# Patient Record
Sex: FEMALE | Race: WHITE | NOT HISPANIC OR LATINO | Employment: OTHER | URBAN - METROPOLITAN AREA
[De-identification: names, ages, dates, MRNs, and addresses within clinical notes are randomized per-mention and may not be internally consistent; named-entity substitution may affect disease eponyms.]

---

## 2017-01-16 DIAGNOSIS — F13.20 BENZODIAZEPINE DEPENDENCE (HCC): ICD-10-CM

## 2017-01-16 DIAGNOSIS — F41.9 ANXIETY: ICD-10-CM

## 2017-01-16 RX ORDER — LORAZEPAM 0.5 MG/1
TABLET ORAL
Qty: 45 TABLET | Refills: 0 | OUTPATIENT
Start: 2017-01-16 | End: 2017-03-04 | Stop reason: SDUPTHER

## 2017-02-08 ENCOUNTER — OFFICE VISIT (OUTPATIENT)
Dept: FAMILY MEDICINE CLINIC | Facility: CLINIC | Age: 82
End: 2017-02-08

## 2017-02-08 VITALS
TEMPERATURE: 97.9 F | DIASTOLIC BLOOD PRESSURE: 70 MMHG | HEART RATE: 80 BPM | BODY MASS INDEX: 24.59 KG/M2 | WEIGHT: 144 LBS | SYSTOLIC BLOOD PRESSURE: 118 MMHG | OXYGEN SATURATION: 96 % | HEIGHT: 64 IN | RESPIRATION RATE: 16 BRPM

## 2017-02-08 DIAGNOSIS — I10 ESSENTIAL HYPERTENSION: ICD-10-CM

## 2017-02-08 DIAGNOSIS — F41.1 GENERALIZED ANXIETY DISORDER: ICD-10-CM

## 2017-02-08 DIAGNOSIS — R53.83 OTHER FATIGUE: Primary | ICD-10-CM

## 2017-02-08 DIAGNOSIS — R50.81 FEVER IN OTHER DISEASES: ICD-10-CM

## 2017-02-08 DIAGNOSIS — E53.8 VITAMIN B12 DEFICIENCY: ICD-10-CM

## 2017-02-08 PROCEDURE — 99214 OFFICE O/P EST MOD 30 MIN: CPT | Performed by: FAMILY MEDICINE

## 2017-02-09 LAB
ALBUMIN SERPL-MCNC: 4.6 G/DL (ref 3.5–5.2)
ALBUMIN/GLOB SERPL: 1.8 G/DL
ALP SERPL-CCNC: 71 U/L (ref 39–117)
ALT SERPL-CCNC: 16 U/L (ref 1–33)
APPEARANCE UR: CLEAR
AST SERPL-CCNC: 20 U/L (ref 1–32)
BACTERIA #/AREA URNS HPF: ABNORMAL /HPF
BASOPHILS # BLD AUTO: 0.02 10*3/MM3 (ref 0–0.2)
BASOPHILS NFR BLD AUTO: 0.2 % (ref 0–1.5)
BILIRUB SERPL-MCNC: 1.2 MG/DL (ref 0.1–1.2)
BILIRUB UR QL STRIP: NEGATIVE
BUN SERPL-MCNC: 27 MG/DL (ref 8–23)
BUN/CREAT SERPL: 22.5 (ref 7–25)
CALCIUM SERPL-MCNC: 10.2 MG/DL (ref 8.6–10.5)
CASTS URNS MICRO: ABNORMAL
CHLORIDE SERPL-SCNC: 96 MMOL/L (ref 98–107)
CO2 SERPL-SCNC: 26.3 MMOL/L (ref 22–29)
COLOR UR: YELLOW
CREAT SERPL-MCNC: 1.2 MG/DL (ref 0.57–1)
EOSINOPHIL # BLD AUTO: 0.1 10*3/MM3 (ref 0–0.7)
EOSINOPHIL NFR BLD AUTO: 1 % (ref 0.3–6.2)
EPI CELLS #/AREA URNS HPF: ABNORMAL /HPF
ERYTHROCYTE [DISTWIDTH] IN BLOOD BY AUTOMATED COUNT: 13 % (ref 11.7–13)
GLOBULIN SER CALC-MCNC: 2.5 GM/DL
GLUCOSE SERPL-MCNC: 95 MG/DL (ref 65–99)
GLUCOSE UR QL: NEGATIVE
HCT VFR BLD AUTO: 44.1 % (ref 35.6–45.5)
HGB BLD-MCNC: 14.3 G/DL (ref 11.9–15.5)
HGB UR QL STRIP: NEGATIVE
IMM GRANULOCYTES # BLD: 0 10*3/MM3 (ref 0–0.03)
IMM GRANULOCYTES NFR BLD: 0 % (ref 0–0.5)
KETONES UR QL STRIP: NEGATIVE
LEUKOCYTE ESTERASE UR QL STRIP: ABNORMAL
LYMPHOCYTES # BLD AUTO: 1.59 10*3/MM3 (ref 0.9–4.8)
LYMPHOCYTES NFR BLD AUTO: 16 % (ref 19.6–45.3)
MCH RBC QN AUTO: 29.9 PG (ref 26.9–32)
MCHC RBC AUTO-ENTMCNC: 32.4 G/DL (ref 32.4–36.3)
MCV RBC AUTO: 92.1 FL (ref 80.5–98.2)
MONOCYTES # BLD AUTO: 0.85 10*3/MM3 (ref 0.2–1.2)
MONOCYTES NFR BLD AUTO: 8.6 % (ref 5–12)
NEUTROPHILS # BLD AUTO: 7.35 10*3/MM3 (ref 1.9–8.1)
NEUTROPHILS NFR BLD AUTO: 74.2 % (ref 42.7–76)
NITRITE UR QL STRIP: NEGATIVE
PH UR STRIP: 7 [PH] (ref 5–8)
PLATELET # BLD AUTO: 283 10*3/MM3 (ref 140–500)
POTASSIUM SERPL-SCNC: 3.6 MMOL/L (ref 3.5–5.2)
PROT SERPL-MCNC: 7.1 G/DL (ref 6–8.5)
PROT UR QL STRIP: NEGATIVE
RBC # BLD AUTO: 4.79 10*6/MM3 (ref 3.9–5.2)
RBC #/AREA URNS HPF: ABNORMAL /HPF
SODIUM SERPL-SCNC: 140 MMOL/L (ref 136–145)
SP GR UR: 1.02 (ref 1–1.03)
TSH SERPL DL<=0.005 MIU/L-ACNC: 3.1 MIU/ML (ref 0.27–4.2)
UROBILINOGEN UR STRIP-MCNC: ABNORMAL MG/DL
VIT B12 SERPL-MCNC: 1753 PG/ML (ref 211–946)
WBC # BLD AUTO: 9.91 10*3/MM3 (ref 4.5–10.7)
WBC #/AREA URNS HPF: ABNORMAL /HPF

## 2017-02-09 NOTE — PROGRESS NOTES
No definite cause of fatigue found...However the vitamin B12 level is very high.  This may be a factor.  I want her to stop all vitamin B12.  We'll need to recheck a level in 3-6 months.  If fatigue continues, follow-up with me in the next few weeks.  No evidence of UTI.  No bacteria in the urine.  Kidney function just slightly sluggish.  Recommend drinking plenty of water.

## 2017-03-04 DIAGNOSIS — F41.9 ANXIETY: ICD-10-CM

## 2017-03-04 DIAGNOSIS — F13.20 BENZODIAZEPINE DEPENDENCE (HCC): ICD-10-CM

## 2017-03-06 RX ORDER — LORAZEPAM 0.5 MG/1
TABLET ORAL
Qty: 45 TABLET | Refills: 0 | Status: SHIPPED | OUTPATIENT
Start: 2017-03-06 | End: 2017-04-15 | Stop reason: SDUPTHER

## 2017-04-15 DIAGNOSIS — F13.20 BENZODIAZEPINE DEPENDENCE (HCC): ICD-10-CM

## 2017-04-15 DIAGNOSIS — F41.9 ANXIETY: ICD-10-CM

## 2017-04-17 RX ORDER — LANSOPRAZOLE 30 MG/1
CAPSULE, DELAYED RELEASE ORAL
Qty: 90 CAPSULE | Refills: 1 | Status: SHIPPED | OUTPATIENT
Start: 2017-04-17 | End: 2017-10-12 | Stop reason: SDUPTHER

## 2017-04-17 RX ORDER — LORAZEPAM 0.5 MG/1
TABLET ORAL
Qty: 45 TABLET | Refills: 0 | OUTPATIENT
Start: 2017-04-17 | End: 2017-05-15 | Stop reason: SDUPTHER

## 2017-04-28 ENCOUNTER — OFFICE VISIT (OUTPATIENT)
Dept: FAMILY MEDICINE CLINIC | Facility: CLINIC | Age: 82
End: 2017-04-28

## 2017-04-28 VITALS
HEART RATE: 80 BPM | TEMPERATURE: 97.6 F | DIASTOLIC BLOOD PRESSURE: 66 MMHG | WEIGHT: 151 LBS | OXYGEN SATURATION: 98 % | SYSTOLIC BLOOD PRESSURE: 112 MMHG | HEIGHT: 63 IN | BODY MASS INDEX: 26.75 KG/M2

## 2017-04-28 DIAGNOSIS — R60.9 DEPENDENT EDEMA: Primary | ICD-10-CM

## 2017-04-28 DIAGNOSIS — G60.9 IDIOPATHIC PERIPHERAL NEUROPATHY: ICD-10-CM

## 2017-04-28 PROCEDURE — 99213 OFFICE O/P EST LOW 20 MIN: CPT | Performed by: FAMILY MEDICINE

## 2017-04-28 NOTE — PROGRESS NOTES
"Subjective   Charo Thomas is a 88 y.o. female.     Chief Complaint   Patient presents with   • Foot Swelling     left 3 weeks        History of Present Illness    Bilateral lower extremity swelling for the last couple of weeks.  Left low but worse in the right.  Doesn't really bother her.  A family member is concerned.  No shortness of breath.  Orthopnea.  No chest pain.  She is exercising somewhat.  History of borderline renal insufficiency.  She's taking triamterene hydrochlorothiazide daily.  Not taking NSAIDs.  No change in diet.  History of neuropathy with numbness in the legs and now some tingling in the feet.  Neuropathy cause is unknown.  No history of chemotherapy.  Remote history of breast cancer.  Had radiation treatment.      The following portions of the patient's history were reviewed and updated as appropriate: allergies, current medications, past family history, past medical history, past social history, past surgical history and problem list.          Review of Systems   Constitutional: Negative.    Respiratory: Negative.    Cardiovascular: Positive for leg swelling. Negative for chest pain and palpitations.   Neurological: Positive for numbness.   Psychiatric/Behavioral: Negative.        Objective   Blood pressure 112/66, pulse 80, temperature 97.6 °F (36.4 °C), temperature source Oral, height 63\" (160 cm), weight 151 lb (68.5 kg), SpO2 98 %.  Physical Exam   Constitutional: She appears well-developed and well-nourished. No distress.   Neck: No thyromegaly present.   Cardiovascular: Normal rate, regular rhythm, normal heart sounds and intact distal pulses.    Pulmonary/Chest: Effort normal and breath sounds normal.   Musculoskeletal: She exhibits edema ( Trace bilateral lower extremity edema.  Left greater than right.  No calf tenderness or swelling.  No skin discoloration.  +1 dorsalis pedis pulse.).   Skin: Skin is warm and dry.   Psychiatric: She has a normal mood and affect. Her behavior is " normal. Judgment and thought content normal.   Nursing note and vitals reviewed.      Assessment/Plan   Charo was seen today for foot swelling.    Diagnoses and all orders for this visit:    Dependent edema  -     Basic Metabolic Panel    Idiopathic peripheral neuropathy     dependent edema.  Relatively asymptomatic.  Possibly related to her peripheral neuropathy.  Previous borderline renal insufficiency.  Continue the diuretic.  Check BMP.  Recommend exercise.  Recommend compression stockings if comfortable.  Call with worsening edema.  No current evidence of congestive heart failure or other major cardiovascular problems.

## 2017-04-29 LAB
BUN SERPL-MCNC: 22 MG/DL (ref 8–23)
BUN/CREAT SERPL: 22.2 (ref 7–25)
CALCIUM SERPL-MCNC: 10.3 MG/DL (ref 8.6–10.5)
CHLORIDE SERPL-SCNC: 97 MMOL/L (ref 98–107)
CO2 SERPL-SCNC: 29.9 MMOL/L (ref 22–29)
CREAT SERPL-MCNC: 0.99 MG/DL (ref 0.57–1)
GLUCOSE SERPL-MCNC: 109 MG/DL (ref 65–99)
POTASSIUM SERPL-SCNC: 3.8 MMOL/L (ref 3.5–5.2)
SODIUM SERPL-SCNC: 143 MMOL/L (ref 136–145)

## 2017-05-04 ENCOUNTER — TELEPHONE (OUTPATIENT)
Dept: FAMILY MEDICINE CLINIC | Facility: CLINIC | Age: 82
End: 2017-05-04

## 2017-05-15 DIAGNOSIS — F13.20 BENZODIAZEPINE DEPENDENCE (HCC): ICD-10-CM

## 2017-05-15 DIAGNOSIS — F41.9 ANXIETY: ICD-10-CM

## 2017-05-15 RX ORDER — LORAZEPAM 0.5 MG/1
TABLET ORAL
Qty: 45 TABLET | Refills: 0 | OUTPATIENT
Start: 2017-05-15 | End: 2017-07-17 | Stop reason: SDUPTHER

## 2017-05-30 ENCOUNTER — APPOINTMENT (OUTPATIENT)
Dept: OTHER | Facility: HOSPITAL | Age: 82
End: 2017-05-30

## 2017-06-13 ENCOUNTER — OFFICE VISIT (OUTPATIENT)
Dept: ONCOLOGY | Facility: CLINIC | Age: 82
End: 2017-06-13

## 2017-06-13 ENCOUNTER — LAB (OUTPATIENT)
Dept: OTHER | Facility: HOSPITAL | Age: 82
End: 2017-06-13

## 2017-06-13 VITALS
DIASTOLIC BLOOD PRESSURE: 70 MMHG | BODY MASS INDEX: 26.59 KG/M2 | SYSTOLIC BLOOD PRESSURE: 106 MMHG | HEART RATE: 90 BPM | TEMPERATURE: 97.6 F | RESPIRATION RATE: 16 BRPM | OXYGEN SATURATION: 96 % | HEIGHT: 63 IN | WEIGHT: 150.1 LBS

## 2017-06-13 DIAGNOSIS — E53.8 VITAMIN B12 DEFICIENCY: ICD-10-CM

## 2017-06-13 DIAGNOSIS — C50.811 MALIGNANT NEOPLASM OF OVERLAPPING SITES OF RIGHT FEMALE BREAST (HCC): ICD-10-CM

## 2017-06-13 DIAGNOSIS — C50.811 MALIGNANT NEOPLASM OF OVERLAPPING SITES OF RIGHT FEMALE BREAST (HCC): Primary | ICD-10-CM

## 2017-06-13 LAB
ALBUMIN SERPL-MCNC: 4.4 G/DL (ref 3.5–5.2)
ALBUMIN/GLOB SERPL: 1.4 G/DL
ALP SERPL-CCNC: 73 U/L (ref 39–117)
ALT SERPL W P-5'-P-CCNC: 13 U/L (ref 1–33)
ANION GAP SERPL CALCULATED.3IONS-SCNC: 15.4 MMOL/L
AST SERPL-CCNC: 20 U/L (ref 1–32)
BASOPHILS # BLD AUTO: 0.05 10*3/MM3 (ref 0–0.2)
BASOPHILS NFR BLD AUTO: 0.8 % (ref 0–1.5)
BILIRUB SERPL-MCNC: 1.2 MG/DL (ref 0.1–1.2)
BUN BLD-MCNC: 22 MG/DL (ref 8–23)
BUN/CREAT SERPL: 19.8 (ref 7–25)
CALCIUM SPEC-SCNC: 10.1 MG/DL (ref 8.6–10.5)
CHLORIDE SERPL-SCNC: 99 MMOL/L (ref 98–107)
CO2 SERPL-SCNC: 31.6 MMOL/L (ref 22–29)
CREAT BLD-MCNC: 1.11 MG/DL (ref 0.57–1)
DEPRECATED RDW RBC AUTO: 42.5 FL (ref 37–54)
EOSINOPHIL # BLD AUTO: 0.13 10*3/MM3 (ref 0–0.7)
EOSINOPHIL NFR BLD AUTO: 2 % (ref 0.3–6.2)
ERYTHROCYTE [DISTWIDTH] IN BLOOD BY AUTOMATED COUNT: 13 % (ref 11.7–13)
GFR SERPL CREATININE-BSD FRML MDRD: 46 ML/MIN/1.73
GLOBULIN UR ELPH-MCNC: 3.1 GM/DL
GLUCOSE BLD-MCNC: 87 MG/DL (ref 65–99)
HCT VFR BLD AUTO: 43.7 % (ref 35.6–45.5)
HGB BLD-MCNC: 14.5 G/DL (ref 11.9–15.5)
IMM GRANULOCYTES # BLD: 0.02 10*3/MM3 (ref 0–0.03)
IMM GRANULOCYTES NFR BLD: 0.3 % (ref 0–0.5)
LYMPHOCYTES # BLD AUTO: 1.77 10*3/MM3 (ref 0.9–4.8)
LYMPHOCYTES NFR BLD AUTO: 27.7 % (ref 19.6–45.3)
MCH RBC QN AUTO: 29.7 PG (ref 26.9–32)
MCHC RBC AUTO-ENTMCNC: 33.2 G/DL (ref 32.4–36.3)
MCV RBC AUTO: 89.4 FL (ref 80.5–98.2)
MONOCYTES # BLD AUTO: 0.46 10*3/MM3 (ref 0.2–1.2)
MONOCYTES NFR BLD AUTO: 7.2 % (ref 5–12)
NEUTROPHILS # BLD AUTO: 3.97 10*3/MM3 (ref 1.9–8.1)
NEUTROPHILS NFR BLD AUTO: 62 % (ref 42.7–76)
NRBC BLD MANUAL-RTO: 0 /100 WBC (ref 0–0)
PLATELET # BLD AUTO: 241 10*3/MM3 (ref 140–500)
PMV BLD AUTO: 9.8 FL (ref 6–12)
POTASSIUM BLD-SCNC: 3.3 MMOL/L (ref 3.5–5.2)
PROT SERPL-MCNC: 7.5 G/DL (ref 6–8.5)
RBC # BLD AUTO: 4.89 10*6/MM3 (ref 3.9–5.2)
SODIUM BLD-SCNC: 146 MMOL/L (ref 136–145)
WBC NRBC COR # BLD: 6.4 10*3/MM3 (ref 4.5–10.7)

## 2017-06-13 PROCEDURE — 80053 COMPREHEN METABOLIC PANEL: CPT | Performed by: INTERNAL MEDICINE

## 2017-06-13 PROCEDURE — 36415 COLL VENOUS BLD VENIPUNCTURE: CPT

## 2017-06-13 PROCEDURE — 99213 OFFICE O/P EST LOW 20 MIN: CPT | Performed by: INTERNAL MEDICINE

## 2017-06-13 PROCEDURE — 85025 COMPLETE CBC W/AUTO DIFF WBC: CPT | Performed by: INTERNAL MEDICINE

## 2017-06-13 NOTE — PROGRESS NOTES
REASONS FOR FOLLOWUP:      1.    History of stage I breast cancer on the right, invasive status post lumpectomy and radiation therapy.  The patient could not accomplish adjuv  ant hormonal therapy.     2. History of vi  tamin B12 deficiency that has been corrected with sublingual vitamin B12 that will remain ongoing at a dose of 1000 mcg every day.    3. Psychiatric illness.  The patient at this time remains and looks compensated.        HISTORY OF PRESENT ILLNESS:  The patient retur  ns today to the office for followup.  She is here today with no new issues.  She remains active physically and mentally and being with a lot of socialization with friends and family.  Her mental illness seems to be very quiet at this time.  Her tremendous     anxiety is not an issue and she has the Ativan to control this part of her problem.  Otherwise the patient has a good level of energy.  Weight is stable.  Her diet is appropriate.  She has no bowel or urinary difficulties.  She has no new cardiorespirator  y   issues.  She has dizziness that has been further evaluated by Dr. 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     She continues complaining of her balance problems associated with her peripheral neuropathy.  She has not had a fall .  She continues using a walker..  Otherwise, she has no symptoms related to her breast cancer that was diagnosed and treated a long time ago.                PAST MEDICAL HISTORY:        1.  Hypertension, on therapy for this.       2. Hyperlipidemia with just diet therapy.      3.   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  No Gaytan's esophagus was found.  She was found to have hyperplastic polyps in the stomach with no atypia.  Duodenum was normal.      4. H. pyloric infection.      5. Colonoscopy documented diverticular disease of colon.      6. Past history of migraines but none for a long time.          HEMATOLOGIC/ONCOLOGIC HISTORY:  History from  previous dates can be found in the separate document.         On 2015 she has no clinical evidence of recurrence of her breast cancer.    Bilateral breast examination was normal and she has otherwise a negative examination.  We advised her to remain in observation.  We went ahead and requested information in regard to the MRI of the brain to further delineate what was discovered and the co  nsultation by Dr. Limon in regard to ENT assessment.        MEDICATIONS:  The current medication list was reviewed with the patient and updates in the EMR this date per the medical assistant.  Medication dosages and frequencies were confirmed to be accurate        ALLERGIES:        1.;  SULFA.    2.; PENICILLIN.     3.; EPINEPHRINE.     4.; CORTISONE.         SOCIAL HISTORY:   The patient is  and lives with  here in [place].  She worked for [employer]; she has been retired for 25 years.  She remains physically and mentally ac  tive and is active with her [employer].  She does not smoke or drink.          FAMILY HISTORY:   Father  of old age at 85. Mother  with dementia at age 82 but had breast removed at age 81 for breast cancer.  The patient had no siblings.   is in good h  ealth.  Her only son was diagnosed with a brain tumor.  I (Dr. Edmond) saw him a year ago and he has been treated at [facility].  Two maternal aunts developed breast cancer; one was age 49 and the other age 70.  Both  at age 90; deaths not due to   consequence of breast cancer.         REVIEW OF SYSTEMS:       PAIN:  See VITAL SIGNS below.        GENERAL:  No change in appetite or weight; no fevers, chills, sweats.       SKIN:  No rashes or nonhealing lesions.    HEME/LYMPH:  No anemia, easy bruising, bleeding or swollen nodes.    EYES:  No vision changes or diplopia.    ENT:  No tinnitus, hearing loss, gum bleeding, epistaxis, hoarseness or dysphagia.    RESPIRATORY:  No cough, shortness of breath,  "hemoptysis or wheezing.    CVS:  No chest pain, palpitations, orthopnea, dyspnea on exertion or PND.    GI:  No abdominal pain, nausea, vomiting, constipation, diarrhea, melena or hematochezia.    : No dysuria or hematuria.  No abnormal vaginal discharge or bleeding.     MUSCULOSKELETAL:  No bone pain or joint stiffness.    NEUROLOGICAL:  No   dizziness, loss of consciousness, focal weakness or  seizures.  Balance difficulties, near fall many times, neuropathy in her legs.    PSYCHIATRIC:   increased nervousness, occasional mood changes, some  depression. Under good control        VITAL SIGNS:   Vitals:    17 1007   BP: 106/70   Pulse: 90   Resp: 16   Temp: 97.6 °F (36.4 °C)   TempSrc: Oral   SpO2: 96%   Weight: 150 lb 1.6 oz (68.1 kg)   Height: 62.99\" (160 cm)     PAIN: 0 out of 10     ECO         PHYSICAL EXAMINATION:    GENERAL:  White female in no acute distress.     SKIN:  Warm, dry without rashes, purpura or petechiae.    HEAD:  Normocephalic.    EYES:  Pupils were equal and reactive to light and accommodation.  She had no nystagmus.     EARS:  Hearing intact.      NOSE:  Septum midline.  No excoriations or nasal discharge.    MOUTH:  Mucosa was normal. Oropharynx was normal. She had no tenderness on the parotid gland and neither enlargement of this.     NECK:  Supple with good range of motion; no thyromegaly or masses, no JVD or bruits.      THROAT:  Oropharynx without lesions or exudates.    NECK:  Supple with good range of motion; no thyromegaly or masses, no JVD or bruits.    LYMPHATICS:  She had no cervical masses or adenopathy. She had no thyroid enlargement.     BREASTS:  Surgical site in the rig  ht breast well healed with no masses, induration or tenderness.  Normal nipple, normal skin.  No axillary adenopathy.  Left breast disclosed no masses, induration or tenderness, normal nipple, normal skin, normal left axilla.      CHEST:  Lungs were perfectly clear with normal breath sounds. No " wheezing, crackles, rhonchi or rubs.     CARDIAC:  Regular. Normal S1, normal S2. No S3. No S4. No murmurs or rubs.     ABDOMEN:  Soft, nontender with no organomegaly or masses.     EXTREMITIES:  Disclosed no tenderness. No edema.     NEURO: neuropathy in both legs with normal strength distlly and proximally, lost of vibratory sensation left leg, decreased r leg compared with upper extremities, norm;la reflexes, no obvious myelopathy         LABORATORY DATA:  Auto Differential   Order: [id number] - Part of Panel Order [id number]   Status:  Final result   Visible to patient:  No (Not Released) Dx:  Malignant neoplasm of overlapping sit...      Ref Range & Units 9:55 AM     WBC 4.50 - 10.70 10*3/mm3 6.40   RBC 3.90 - 5.20 10*6/mm3 4.89   Hemoglobin 11.9 - 15.5 g/dL 14.5   Hematocrit 35.6 - 45.5 % 43.7   MCV 80.5 - 98.2 fL 89.4   MCH 26.9 - 32.0 pg 29.7   MCHC 32.4 - 36.3 g/dL 33.2   RDW 11.7 - 13.0 % 13.0   RDW-SD 37.0 - 54.0 fl 42.5   MPV 6.0 - 12.0 fL 9.8   Platelets 140 - 500 10*3/mm3 241   Neutrophil % 42.7 - 76.0 % 62.0   Lymphocyte % 19.6 - 45.3 % 27.7   Monocyte % 5.0 - 12.0 % 7.2   Eosinophil % 0.3 - 6.2 % 2.0   Basophil % 0.0 - 1.5 % 0.8   Immature Grans % 0.0 - 0.5 % 0.3   Neutrophils, Absolute 1.90 - 8.10 10*3/mm3 3.97   Lymphocytes, Absolute 0.90 - 4.80 10*3/mm3 1.77   Monocytes, Absolute 0.20 - 1.20 10*3/mm3 0.46   Eosinophils, Absolute 0.00 - 0.70 10*3/mm3 0.13   Basophils, Absolute 0.00 - 0.20 10*3/mm3 0.05   Immature Grans, Absolute 0.00 - 0.03 10*3/mm3 0.02   nRBC 0.0 - 0.0 /100 WBC 0.0   Resulting Agency  [facility]      Specimen Collected: 06/13/17  9:55 AM Last Resulted: 06/13/17                        ASSESSMENT/PLAN:     1.    This patient has remote history of  Right upper outer quadrant breast cancer. I find nothing to suggest recurrent disease. Mammogram is normal and breasts examination today  not showing any new abnormalities.     2. The patient has history of vitamin B12 deficiency.  This  has been correc  venkata with IM vitamin B12 and she remains on a program of sublingual vitamin B12 supplementation 1000 mcg a day that she will remain ongoing for the time being.      3. Otherwise tentatively we will review her back in 6 months. Cbc at that time

## 2017-06-14 RX ORDER — TRIAMTERENE AND HYDROCHLOROTHIAZIDE 37.5; 25 MG/1; MG/1
CAPSULE ORAL
Qty: 90 CAPSULE | Refills: 1 | Status: SHIPPED | OUTPATIENT
Start: 2017-06-14 | End: 2017-12-15 | Stop reason: SDUPTHER

## 2017-06-22 ENCOUNTER — TRANSCRIBE ORDERS (OUTPATIENT)
Dept: ONCOLOGY | Facility: CLINIC | Age: 82
End: 2017-06-22

## 2017-06-22 DIAGNOSIS — Z12.31 SCREENING MAMMOGRAM, ENCOUNTER FOR: Primary | ICD-10-CM

## 2017-07-12 DIAGNOSIS — F41.9 ANXIETY: ICD-10-CM

## 2017-07-12 DIAGNOSIS — F13.20 BENZODIAZEPINE DEPENDENCE (HCC): ICD-10-CM

## 2017-07-12 RX ORDER — LORAZEPAM 0.5 MG/1
TABLET ORAL
Qty: 45 TABLET | Refills: 0 | OUTPATIENT
Start: 2017-07-12

## 2017-07-12 NOTE — TELEPHONE ENCOUNTER
I have called and left a voicemail for the patient to come in to see Dr. Ortega for a  Refill request.

## 2017-07-17 DIAGNOSIS — F13.20 BENZODIAZEPINE DEPENDENCE (HCC): ICD-10-CM

## 2017-07-17 DIAGNOSIS — F41.9 ANXIETY: ICD-10-CM

## 2017-07-17 RX ORDER — LORAZEPAM 0.5 MG/1
0.5 TABLET ORAL DAILY PRN
Qty: 45 TABLET | Refills: 0 | Status: SHIPPED | OUTPATIENT
Start: 2017-07-17 | End: 2017-08-28 | Stop reason: SDUPTHER

## 2017-07-19 ENCOUNTER — HOSPITAL ENCOUNTER (OUTPATIENT)
Dept: MAMMOGRAPHY | Facility: HOSPITAL | Age: 82
Discharge: HOME | End: 2017-07-19
Attending: INTERNAL MEDICINE | Admitting: INTERNAL MEDICINE

## 2017-07-19 DIAGNOSIS — Z12.31 SCREENING MAMMOGRAM, ENCOUNTER FOR: ICD-10-CM

## 2017-07-19 PROCEDURE — G0202 SCR MAMMO BI INCL CAD: HCPCS

## 2017-07-19 PROCEDURE — 77063 BREAST TOMOSYNTHESIS BI: CPT

## 2017-08-28 DIAGNOSIS — F13.20 BENZODIAZEPINE DEPENDENCE (HCC): ICD-10-CM

## 2017-08-28 DIAGNOSIS — F41.9 ANXIETY: ICD-10-CM

## 2017-08-28 RX ORDER — LORAZEPAM 0.5 MG/1
0.5 TABLET ORAL DAILY PRN
Qty: 45 TABLET | Refills: 0 | OUTPATIENT
Start: 2017-08-28 | End: 2017-10-12 | Stop reason: SDUPTHER

## 2017-10-12 DIAGNOSIS — F13.20 BENZODIAZEPINE DEPENDENCE (HCC): ICD-10-CM

## 2017-10-12 DIAGNOSIS — F41.9 ANXIETY: ICD-10-CM

## 2017-10-12 RX ORDER — LANSOPRAZOLE 30 MG/1
CAPSULE, DELAYED RELEASE ORAL
Qty: 90 CAPSULE | Refills: 1 | Status: SHIPPED | OUTPATIENT
Start: 2017-10-12 | End: 2018-02-15 | Stop reason: SDUPTHER

## 2017-10-12 RX ORDER — LORAZEPAM 0.5 MG/1
TABLET ORAL
Qty: 45 TABLET | Refills: 0 | OUTPATIENT
Start: 2017-10-12 | End: 2017-11-16 | Stop reason: SDUPTHER

## 2017-11-16 DIAGNOSIS — F41.9 ANXIETY: ICD-10-CM

## 2017-11-16 DIAGNOSIS — F13.20 BENZODIAZEPINE DEPENDENCE (HCC): ICD-10-CM

## 2017-11-16 RX ORDER — LORAZEPAM 0.5 MG/1
TABLET ORAL
Qty: 45 TABLET | Refills: 0 | OUTPATIENT
Start: 2017-11-16 | End: 2017-12-15 | Stop reason: SDUPTHER

## 2017-12-15 DIAGNOSIS — F13.20 BENZODIAZEPINE DEPENDENCE (HCC): ICD-10-CM

## 2017-12-15 DIAGNOSIS — F41.9 ANXIETY: ICD-10-CM

## 2017-12-15 RX ORDER — LORAZEPAM 0.5 MG/1
TABLET ORAL
Qty: 45 TABLET | Refills: 0 | OUTPATIENT
Start: 2017-12-15 | End: 2018-01-11 | Stop reason: SDUPTHER

## 2017-12-15 RX ORDER — TRIAMTERENE AND HYDROCHLOROTHIAZIDE 37.5; 25 MG/1; MG/1
CAPSULE ORAL
Qty: 90 CAPSULE | Refills: 1 | Status: SHIPPED | OUTPATIENT
Start: 2017-12-15 | End: 2018-02-15 | Stop reason: SDUPTHER

## 2018-01-02 ENCOUNTER — OFFICE VISIT (OUTPATIENT)
Dept: ONCOLOGY | Facility: CLINIC | Age: 83
End: 2018-01-02

## 2018-01-02 ENCOUNTER — LAB (OUTPATIENT)
Dept: OTHER | Facility: HOSPITAL | Age: 83
End: 2018-01-02

## 2018-01-02 VITALS
OXYGEN SATURATION: 96 % | RESPIRATION RATE: 16 BRPM | SYSTOLIC BLOOD PRESSURE: 145 MMHG | TEMPERATURE: 97.8 F | HEIGHT: 63 IN | BODY MASS INDEX: 26.52 KG/M2 | WEIGHT: 149.7 LBS | DIASTOLIC BLOOD PRESSURE: 72 MMHG | HEART RATE: 76 BPM

## 2018-01-02 DIAGNOSIS — G60.9 IDIOPATHIC PERIPHERAL NEUROPATHY: ICD-10-CM

## 2018-01-02 DIAGNOSIS — Z17.0 MALIGNANT NEOPLASM OF OVERLAPPING SITES OF RIGHT BREAST IN FEMALE, ESTROGEN RECEPTOR POSITIVE (HCC): Primary | ICD-10-CM

## 2018-01-02 DIAGNOSIS — C50.811 MALIGNANT NEOPLASM OF OVERLAPPING SITES OF RIGHT FEMALE BREAST (HCC): ICD-10-CM

## 2018-01-02 DIAGNOSIS — C50.811 MALIGNANT NEOPLASM OF OVERLAPPING SITES OF RIGHT BREAST IN FEMALE, ESTROGEN RECEPTOR POSITIVE (HCC): Primary | ICD-10-CM

## 2018-01-02 DIAGNOSIS — E53.8 VITAMIN B12 DEFICIENCY: ICD-10-CM

## 2018-01-02 LAB
BASOPHILS # BLD AUTO: 0.05 10*3/MM3 (ref 0–0.2)
BASOPHILS NFR BLD AUTO: 0.7 % (ref 0–1.5)
DEPRECATED RDW RBC AUTO: 40.5 FL (ref 37–54)
EOSINOPHIL # BLD AUTO: 0.14 10*3/MM3 (ref 0–0.7)
EOSINOPHIL NFR BLD AUTO: 1.9 % (ref 0.3–6.2)
ERYTHROCYTE [DISTWIDTH] IN BLOOD BY AUTOMATED COUNT: 12.8 % (ref 11.7–13)
HCT VFR BLD AUTO: 43.6 % (ref 35.6–45.5)
HGB BLD-MCNC: 15 G/DL (ref 11.9–15.5)
IMM GRANULOCYTES # BLD: 0.03 10*3/MM3 (ref 0–0.03)
IMM GRANULOCYTES NFR BLD: 0.4 % (ref 0–0.5)
LYMPHOCYTES # BLD AUTO: 1.66 10*3/MM3 (ref 0.9–4.8)
LYMPHOCYTES NFR BLD AUTO: 22.6 % (ref 19.6–45.3)
MCH RBC QN AUTO: 29.8 PG (ref 26.9–32)
MCHC RBC AUTO-ENTMCNC: 34.4 G/DL (ref 32.4–36.3)
MCV RBC AUTO: 86.7 FL (ref 80.5–98.2)
MONOCYTES # BLD AUTO: 0.65 10*3/MM3 (ref 0.2–1.2)
MONOCYTES NFR BLD AUTO: 8.8 % (ref 5–12)
NEUTROPHILS # BLD AUTO: 4.83 10*3/MM3 (ref 1.9–8.1)
NEUTROPHILS NFR BLD AUTO: 65.6 % (ref 42.7–76)
NRBC BLD MANUAL-RTO: 0 /100 WBC (ref 0–0)
PLATELET # BLD AUTO: 191 10*3/MM3 (ref 140–500)
PMV BLD AUTO: 10.1 FL (ref 6–12)
RBC # BLD AUTO: 5.03 10*6/MM3 (ref 3.9–5.2)
WBC NRBC COR # BLD: 7.36 10*3/MM3 (ref 4.5–10.7)

## 2018-01-02 PROCEDURE — 36415 COLL VENOUS BLD VENIPUNCTURE: CPT

## 2018-01-02 PROCEDURE — 85025 COMPLETE CBC W/AUTO DIFF WBC: CPT | Performed by: INTERNAL MEDICINE

## 2018-01-02 PROCEDURE — 99213 OFFICE O/P EST LOW 20 MIN: CPT | Performed by: INTERNAL MEDICINE

## 2018-01-02 NOTE — PROGRESS NOTES
REASONS FOR FOLLOWUP:      1.    History of stage I breast cancer on the right, invasive status post lumpectomy and radiation therapy.  The patient could not accomplish adjuv  ant hormonal therapy.     2. History of vi  tamin B12 deficiency that has been corrected with sublingual vitamin B12 that will remain ongoing at a dose of 1000 mcg every day.    3. Psychiatric illness.  The patient at this time remains and looks compensated.        HISTORY OF PRESENT ILLNESS:  The patient retur  ns today to the office for followup.  She is here today with no new issues.  She remains active physically and mentally and being with a lot of socialization with friends and family.  Her mental illness seems to be very quiet at this time.  Her tremendous     anxiety is not an issue and she has the Ativan to control this part of her problem.  Otherwise the patient has a good level of energy.  Weight is stable.  Her diet is appropriate.  She has no bowel or urinary difficulties.  She has no new cardiorespirator  y   issues.  She has dizziness that has been further evaluated by Dr. 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     She continues complaining of her balance problems associated with her peripheral neuropathy.  She has not had a fall .  She continues using a walker..  Otherwise, she has no symptoms related to her breast cancer that was diagnosed and treated a long time ago.                PAST MEDICAL HISTORY:        1.  Hypertension, on therapy for this.       2. Hyperlipidemia with just diet therapy.      3.   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  No Gaytan's esophagus was found.  She was found to have hyperplastic polyps in the stomach with no atypia.  Duodenum was normal.      4. H. pyloric infection.      5. Colonoscopy documented diverticular disease of colon.      6. Past history of migraines but none for a long time.          HEMATOLOGIC/ONCOLOGIC HISTORY:  History from  previous dates can be found in the separate document.         On 2015 she has no clinical evidence of recurrence of her breast cancer.    Bilateral breast examination was normal and she has otherwise a negative examination.  We advised her to remain in observation.  We went ahead and requested information in regard to the MRI of the brain to further delineate what was discovered and the co  nsultation by Dr. Limon in regard to ENT assessment.        MEDICATIONS:  The current medication list was reviewed with the patient and updates in the EMR this date per the medical assistant.  Medication dosages and frequencies were confirmed to be accurate        ALLERGIES:        1.;  SULFA.    2.; PENICILLIN.     3.; EPINEPHRINE.     4.; CORTISONE.         SOCIAL HISTORY:   The patient is  and lives with  here in [place].  She worked for [employer]; she has been retired for 25 years.  She remains physically and mentally ac  tive and is active with her [employer].  She does not smoke or drink.          FAMILY HISTORY:   Father  of old age at 85. Mother  with dementia at age 82 but had breast removed at age 81 for breast cancer.  The patient had no siblings.   is in good h  ealth.  Her only son was diagnosed with a brain tumor.  I (Dr. Edmond) saw him a year ago and he has been treated at [facility].  Two maternal aunts developed breast cancer; one was age 49 and the other age 70.  Both  at age 90; deaths not due to   consequence of breast cancer.         REVIEW OF SYSTEMS:       PAIN:  See VITAL SIGNS below.        GENERAL:  No change in appetite or weight; no fevers, chills, sweats.       SKIN:  No rashes or nonhealing lesions.    HEME/LYMPH:  No anemia, easy bruising, bleeding or swollen nodes.    EYES:  No vision changes or diplopia.    ENT:  No tinnitus, hearing loss, gum bleeding, epistaxis, hoarseness or dysphagia.    RESPIRATORY:  No cough, shortness of breath,  "hemoptysis or wheezing.    CVS:  No chest pain, palpitations, orthopnea, dyspnea on exertion or PND.    GI:  No abdominal pain, nausea, vomiting, constipation, diarrhea, melena or hematochezia.    : No dysuria or hematuria.  No abnormal vaginal discharge or bleeding.     MUSCULOSKELETAL:  No bone pain or joint stiffness.    NEUROLOGICAL:  No   dizziness, loss of consciousness, focal weakness or  seizures.  Balance difficulties, near fall many times, neuropathy in her legs.    PSYCHIATRIC:   increased nervousness, occasional mood changes, some  depression. Under good control        VITAL SIGNS:   Vitals:    18 1034   BP: 145/72   Pulse: 76   Resp: 16   Temp: 97.8 °F (36.6 °C)   TempSrc: Oral   SpO2: 96%   Weight: 67.9 kg (149 lb 11.2 oz)   Height: 160 cm (62.99\")     PAIN: 0 out of 10     ECO         PHYSICAL EXAMINATION:    GENERAL:  White female in no acute distress.     SKIN:  Warm, dry without rashes, purpura or petechiae.    HEAD:  Normocephalic.    EYES:  Pupils were equal and reactive to light and accommodation.  She had no nystagmus.     EARS:  Hearing intact.      NOSE:  Septum midline.  No excoriations or nasal discharge.    MOUTH:  Mucosa was normal. Oropharynx was normal. She had no tenderness on the parotid gland and neither enlargement of this.     NECK:  Supple with good range of motion; no thyromegaly or masses, no JVD or bruits.      THROAT:  Oropharynx without lesions or exudates.    NECK:  Supple with good range of motion; no thyromegaly or masses, no JVD or bruits.    LYMPHATICS:  She had no cervical masses or adenopathy. She had no thyroid enlargement.     BREASTS:  Surgical site in the rig  ht breast well healed with no masses, induration or tenderness.  Normal nipple, normal skin.  No axillary adenopathy.  Left breast disclosed no masses, induration or tenderness, normal nipple, normal skin, normal left axilla.      CHEST:  Lungs were perfectly clear with normal breath sounds. No " wheezing, crackles, rhonchi or rubs.     CARDIAC:  Regular. Normal S1, normal S2. No S3. No S4. No murmurs or rubs.     ABDOMEN:  Soft, nontender with no organomegaly or masses.     EXTREMITIES:  Disclosed no tenderness. No edema.     NEURO: neuropathy in both legs with normal strength distlly and proximally, lost of vibratory sensation left leg, decreased r leg compared with upper extremities, norm;la reflexes, no obvious myelopathy         LABORATORY DATA:  CBC Auto Differential   Order: [id number] - Part of Panel Order [id number]   Status:  Final result   Visible to patient:  No (Not Released) Dx:  Malignant neoplasm of overlapping sit...      Ref Range & Units 10:12 AM     WBC 4.50 - 10.70 10*3/mm3 7.36   RBC 3.90 - 5.20 10*6/mm3 5.03   Hemoglobin 11.9 - 15.5 g/dL 15.0   Hematocrit 35.6 - 45.5 % 43.6   MCV 80.5 - 98.2 fL 86.7   MCH 26.9 - 32.0 pg 29.8   MCHC 32.4 - 36.3 g/dL 34.4   RDW 11.7 - 13.0 % 12.8   RDW-SD 37.0 - 54.0 fl 40.5   MPV 6.0 - 12.0 fL 10.1   Platelets 140 - 500 10*3/mm3 191   Neutrophil % 42.7 - 76.0 % 65.6   Lymphocyte % 19.6 - 45.3 % 22.6   Monocyte % 5.0 - 12.0 % 8.8   Eosinophil % 0.3 - 6.2 % 1.9   Basophil % 0.0 - 1.5 % 0.7   Immature Grans % 0.0 - 0.5 % 0.4   Neutrophils, Absolute 1.90 - 8.10 10*3/mm3 4.83                        ASSESSMENT/PLAN:     1.    This patient has remote history of  Right upper outer quadrant breast cancer. I find nothing to suggest recurrent disease. Mammogram is normal and breasts examination today  not showing any new abnormalities.     2. The patient has history of vitamin B12 deficiency.  This has been correc  venkata with IM vitamin B12 and she remains on a program of sublingual vitamin B12 supplementation 1000 mcg a day that she will remain ongoing for the time being.      3. Otherwise tentatively we will review her back in 6 months. Cbc at that time

## 2018-01-11 DIAGNOSIS — F41.9 ANXIETY: ICD-10-CM

## 2018-01-11 DIAGNOSIS — F13.20 BENZODIAZEPINE DEPENDENCE (HCC): ICD-10-CM

## 2018-01-12 RX ORDER — LORAZEPAM 0.5 MG/1
TABLET ORAL
Qty: 45 TABLET | Refills: 0 | OUTPATIENT
Start: 2018-01-12 | End: 2018-02-15 | Stop reason: SDUPTHER

## 2018-02-14 DIAGNOSIS — F13.20 BENZODIAZEPINE DEPENDENCE (HCC): ICD-10-CM

## 2018-02-14 DIAGNOSIS — F41.9 ANXIETY: ICD-10-CM

## 2018-02-14 RX ORDER — LORAZEPAM 0.5 MG/1
TABLET ORAL
Qty: 45 TABLET | Refills: 0 | OUTPATIENT
Start: 2018-02-14

## 2018-02-15 ENCOUNTER — OFFICE VISIT (OUTPATIENT)
Dept: FAMILY MEDICINE CLINIC | Facility: CLINIC | Age: 83
End: 2018-02-15

## 2018-02-15 VITALS
HEART RATE: 87 BPM | SYSTOLIC BLOOD PRESSURE: 120 MMHG | TEMPERATURE: 97.9 F | HEIGHT: 63 IN | OXYGEN SATURATION: 97 % | BODY MASS INDEX: 26.58 KG/M2 | WEIGHT: 150 LBS | DIASTOLIC BLOOD PRESSURE: 62 MMHG

## 2018-02-15 DIAGNOSIS — I10 ESSENTIAL HYPERTENSION: Primary | ICD-10-CM

## 2018-02-15 DIAGNOSIS — G60.9 IDIOPATHIC PERIPHERAL NEUROPATHY: ICD-10-CM

## 2018-02-15 DIAGNOSIS — K21.9 GASTROESOPHAGEAL REFLUX DISEASE, ESOPHAGITIS PRESENCE NOT SPECIFIED: ICD-10-CM

## 2018-02-15 DIAGNOSIS — F41.9 ANXIETY: ICD-10-CM

## 2018-02-15 DIAGNOSIS — F13.20 BENZODIAZEPINE DEPENDENCE (HCC): ICD-10-CM

## 2018-02-15 PROCEDURE — 99214 OFFICE O/P EST MOD 30 MIN: CPT | Performed by: FAMILY MEDICINE

## 2018-02-15 RX ORDER — LANSOPRAZOLE 30 MG/1
30 CAPSULE, DELAYED RELEASE ORAL DAILY
Qty: 90 CAPSULE | Refills: 1 | Status: SHIPPED | OUTPATIENT
Start: 2018-02-15 | End: 2018-09-10 | Stop reason: SDUPTHER

## 2018-02-15 RX ORDER — TRIAMTERENE AND HYDROCHLOROTHIAZIDE 37.5; 25 MG/1; MG/1
1 CAPSULE ORAL DAILY
Qty: 90 CAPSULE | Refills: 1 | Status: SHIPPED | OUTPATIENT
Start: 2018-02-15 | End: 2018-09-10 | Stop reason: SDUPTHER

## 2018-02-15 RX ORDER — LORAZEPAM 0.5 MG/1
0.5 TABLET ORAL 2 TIMES DAILY
Qty: 90 TABLET | Refills: 1 | OUTPATIENT
Start: 2018-02-15 | End: 2018-08-16 | Stop reason: SDUPTHER

## 2018-02-15 NOTE — PROGRESS NOTES
"Subjective   Charo Thomas is a 88 y.o. female.     Hypertension (follow up)    History of Present Illness    Hypertension follow up. Doing well with current medication which she is taking as directed. No known high or low blood pressure episodes. No cardiovascular or neurological symptoms. Today's BP: 120/62.  Use her Dyazide without complaint.    GERD.  Does well with Prevacid 30 mg a day.  No side effects.    Peripheral neuropathy.  Related to previous radiation therapy.  History of breast cancer.  Continues to follow with oncology.  The neuropathy is overall stable for her.    Anxiety.  Long-standing.  Mild benzodiazepine dependence.  She continues lorazepam 0.5 mg tablet, one half tablet twice a day.  She has been taking this for many years.  She has a long history of anxiety since childhood.  She gets no lightheadedness or dizziness.  No hallucinations.  Her memory she states is fine.  She does not have depression.  No falls or near falls.      The following portions of the patient's history were reviewed and updated as appropriate: allergies, current medications, past family history, past medical history, past social history, past surgical history and problem list.      Review of Systems   Constitutional: Negative.    Respiratory: Negative.    Cardiovascular: Negative.    Musculoskeletal: Negative.    Neurological: Negative.    Psychiatric/Behavioral: Negative.  Negative for dysphoric mood. The patient is not nervous/anxious.        Objective   Blood pressure 120/62, pulse 87, temperature 97.9 °F (36.6 °C), temperature source Oral, height 160 cm (62.99\"), weight 68 kg (150 lb), SpO2 97 %.  Physical Exam   Constitutional: She appears well-developed and well-nourished. No distress.   Neck: No thyromegaly present.   Cardiovascular: Normal rate, regular rhythm, normal heart sounds and intact distal pulses.    Pulmonary/Chest: Effort normal and breath sounds normal.   Musculoskeletal: She exhibits no edema. "   Skin: Skin is warm and dry.   Psychiatric: She has a normal mood and affect. Her behavior is normal. Judgment and thought content normal.   Nursing note and vitals reviewed.      Assessment/Plan   Charo was seen today for hypertension.    Diagnoses and all orders for this visit:    Essential hypertension    Anxiety  -     LORazepam (ATIVAN) 0.5 MG tablet; Take 1 tablet by mouth 2 (Two) Times a Day.    Benzodiazepine dependence  -     LORazepam (ATIVAN) 0.5 MG tablet; Take 1 tablet by mouth 2 (Two) Times a Day.    Gastroesophageal reflux disease, esophagitis presence not specified    Idiopathic peripheral neuropathy    Other orders  -     triamterene-hydrochlorothiazide (DYAZIDE) 37.5-25 MG per capsule; Take 1 capsule by mouth Daily.  -     lansoprazole (PREVACID) 30 MG capsule; Take 1 capsule by mouth Daily.      Hypertension.  Good control.  Continue triamterene hydrochlorothiazide.  See me in 6 months recheck an annual wellness visit.    Anxiety.  Benzodiazepine dependence, mild.  Continue lorazepam 0.5 mg tablet one half tablet twice a day.  Patient is aware of the risks of taking this medication long-term.  At this time she is quite tolerant to the medication.  It's therapeutic effect is in question, but it is a low dose and she is tolerating any potential side effects.  She understands if she starts having changes in memory, frequent falls, or other issues will need to address weaning this.  But this time she and family members are not interested.    Idiopathic peripheral neuropathy.  Possibly related to previous radiation therapy.  History of breast cancer.  She's doing well.    GERD.  She continues Prevacid.  Refill given.

## 2018-06-14 ENCOUNTER — TRANSCRIBE ORDERS (OUTPATIENT)
Dept: ADMINISTRATIVE | Facility: HOSPITAL | Age: 83
End: 2018-06-14

## 2018-06-14 DIAGNOSIS — Z12.39 BREAST CANCER SCREENING: Primary | ICD-10-CM

## 2018-07-20 ENCOUNTER — HOSPITAL ENCOUNTER (OUTPATIENT)
Dept: MAMMOGRAPHY | Facility: HOSPITAL | Age: 83
Discharge: HOME | End: 2018-07-20
Attending: INTERNAL MEDICINE | Admitting: INTERNAL MEDICINE

## 2018-07-20 DIAGNOSIS — Z12.39 BREAST CANCER SCREENING: ICD-10-CM

## 2018-07-20 PROCEDURE — 77067 SCR MAMMO BI INCL CAD: CPT

## 2018-07-20 PROCEDURE — 77063 BREAST TOMOSYNTHESIS BI: CPT

## 2018-08-16 ENCOUNTER — OFFICE VISIT (OUTPATIENT)
Dept: FAMILY MEDICINE CLINIC | Facility: CLINIC | Age: 83
End: 2018-08-16

## 2018-08-16 VITALS
OXYGEN SATURATION: 98 % | TEMPERATURE: 97.1 F | DIASTOLIC BLOOD PRESSURE: 77 MMHG | HEIGHT: 63 IN | BODY MASS INDEX: 25.71 KG/M2 | WEIGHT: 145.1 LBS | SYSTOLIC BLOOD PRESSURE: 137 MMHG | HEART RATE: 84 BPM

## 2018-08-16 DIAGNOSIS — F13.20 BENZODIAZEPINE DEPENDENCE (HCC): ICD-10-CM

## 2018-08-16 DIAGNOSIS — Z00.00 MEDICARE ANNUAL WELLNESS VISIT, SUBSEQUENT: Primary | ICD-10-CM

## 2018-08-16 DIAGNOSIS — F41.9 ANXIETY: ICD-10-CM

## 2018-08-16 DIAGNOSIS — E53.8 VITAMIN B12 DEFICIENCY: ICD-10-CM

## 2018-08-16 DIAGNOSIS — Z17.0 MALIGNANT NEOPLASM OF OVERLAPPING SITES OF RIGHT BREAST IN FEMALE, ESTROGEN RECEPTOR POSITIVE (HCC): ICD-10-CM

## 2018-08-16 DIAGNOSIS — D33.3 ACOUSTIC NEUROMA (HCC): ICD-10-CM

## 2018-08-16 DIAGNOSIS — I72.1 RADIAL ARTERY ANEURYSM, LEFT (HCC): ICD-10-CM

## 2018-08-16 DIAGNOSIS — I10 ESSENTIAL HYPERTENSION: ICD-10-CM

## 2018-08-16 DIAGNOSIS — C50.811 MALIGNANT NEOPLASM OF OVERLAPPING SITES OF RIGHT BREAST IN FEMALE, ESTROGEN RECEPTOR POSITIVE (HCC): ICD-10-CM

## 2018-08-16 PROCEDURE — G0439 PPPS, SUBSEQ VISIT: HCPCS | Performed by: FAMILY MEDICINE

## 2018-08-16 PROCEDURE — 90670 PCV13 VACCINE IM: CPT | Performed by: FAMILY MEDICINE

## 2018-08-16 PROCEDURE — G0009 ADMIN PNEUMOCOCCAL VACCINE: HCPCS | Performed by: FAMILY MEDICINE

## 2018-08-16 PROCEDURE — 99213 OFFICE O/P EST LOW 20 MIN: CPT | Performed by: FAMILY MEDICINE

## 2018-08-16 RX ORDER — LORAZEPAM 0.5 MG/1
TABLET ORAL
Qty: 90 TABLET | Refills: 1 | Status: SHIPPED | OUTPATIENT
Start: 2018-08-16 | End: 2019-03-14 | Stop reason: SDUPTHER

## 2018-08-16 NOTE — PROGRESS NOTES
QUICK REFERENCE INFORMATION:  The ABCs of the Annual Wellness Visit    Subsequent Medicare Wellness Visit    HEALTH RISK ASSESSMENT    4/3/1929    Recent Hospitalizations:  No hospitalization(s) within the last year..        Current Medical Providers:  Patient Care Team:  Gal Ortega MD as PCP - General (Family Medicine)  Raul Macdonald MD as PCP - Family Medicine  Lalo Edmond MD as PCP - Claims Attributed  Lalo Edmond MD as Consulting Physician (Hematology and Oncology)  Marvin Tello MD as Referring Physician (Breast Surgery)        Smoking Status:  History   Smoking Status   • Never Smoker   Smokeless Tobacco   • Never Used       Alcohol Consumption:  History   Alcohol Use No       Depression Screen:   PHQ-2/PHQ-9 Depression Screening 8/16/2018   Little interest or pleasure in doing things 0   Feeling down, depressed, or hopeless 0   Total Score 0       Health Habits and Functional and Cognitive Screening:  Functional & Cognitive Status 8/16/2018   Do you have difficulty preparing food and eating? No   Do you have difficulty bathing yourself, getting dressed or grooming yourself? No   Do you have difficulty using the toilet? No   Do you have difficulty moving around from place to place? No   Do you have trouble with steps or getting out of a bed or a chair? Yes   In the past year have you fallen or experienced a near fall? No   Current Diet Well Balanced Diet   Dental Exam Up to date   Eye Exam Up to date   Exercise (times per week) 0 times per week   Current Exercise Activities Include None   Do you need help using the phone?  No   Are you deaf or do you have serious difficulty hearing?  No   Do you need help with transportation? Yes   Do you need help shopping? Yes   Do you need help preparing meals?  No   Do you need help with housework?  No   Do you need help with laundry? No   Do you need help taking your medications? No   Do you need help managing money? No   Do you ever drive or  ride in a car without wearing a seat belt? No   Have you felt unusual stress, anger or loneliness in the last month? No   Who do you live with? Spouse   If you need help, do you have trouble finding someone available to you? No   Have you been bothered in the last four weeks by sexual problems? No   Do you have difficulty concentrating, remembering or making decisions? Yes           Does the patient have evidence of cognitive impairment? No    Aspirin use counseling: Does not need ASA (and currently is not on it)      Recent Lab Results:  CMP:  Lab Results   Component Value Date     (H) 04/28/2017    BUN 22 06/13/2017    CREATININE 1.11 (H) 06/13/2017    EGFRIFNONA 46 (L) 06/13/2017    EGFRIFAFRI 64 04/28/2017    BCR 19.8 06/13/2017     (H) 06/13/2017    K 3.3 (L) 06/13/2017    CO2 31.6 (H) 06/13/2017    CALCIUM 10.1 06/13/2017    PROTENTOTREF 7.1 02/08/2017    ALBUMIN 4.40 06/13/2017    LABGLOBREF 2.5 02/08/2017    LABIL2 1.8 02/08/2017    BILITOT 1.2 06/13/2017    ALKPHOS 73 06/13/2017    AST 20 06/13/2017    ALT 13 06/13/2017     Lipid Panel:     HbA1c:       Visual Acuity:  No exam data present    Age-appropriate Screening Schedule:  Refer to the list below for future screening recommendations based on patient's age, sex and/or medical conditions. Orders for these recommended tests are listed in the plan section. The patient has been provided with a written plan.    Health Maintenance   Topic Date Due   • TDAP/TD VACCINES (1 - Tdap) 04/03/1948   • ZOSTER VACCINE (1 of 2) 04/03/1979   • LIPID PANEL  10/28/2016   • PNEUMOCOCCAL VACCINES (65+ LOW/MEDIUM RISK) (2 of 2 - PCV13) 04/27/2017   • DXA SCAN  01/02/2018   • INFLUENZA VACCINE  08/01/2018   • MAMMOGRAM  07/20/2020        Immunization History   Administered Date(s) Administered   • Pneumococcal Polysaccharide (PPSV23) 04/27/2016         Subjective   History of Present Illness    Chaor Thomas is a 89 y.o. female who presents for an Subsequent  "Wellness Visit.    The following portions of the patient's history were reviewed and updated as appropriate: allergies, current medications, past family history, past medical history, past social history, past surgical history and problem list.    Outpatient Medications Prior to Visit   Medication Sig Dispense Refill   • B Complex Vitamins (VITAMIN-B COMPLEX PO) Take  by mouth daily.     • Cholecalciferol (VITAMIN D) 2000 UNITS capsule Take  by mouth.     • lansoprazole (PREVACID) 30 MG capsule Take 1 capsule by mouth Daily. 90 capsule 1   • triamterene-hydrochlorothiazide (DYAZIDE) 37.5-25 MG per capsule Take 1 capsule by mouth Daily. 90 capsule 1   • LORazepam (ATIVAN) 0.5 MG tablet Take 1 tablet by mouth 2 (Two) Times a Day. 90 tablet 1     No facility-administered medications prior to visit.        Patient Active Problem List   Diagnosis   • Anxiety   • Benzodiazepine dependence (CMS/HCC)   • Generalized anxiety disorder   • Gastroesophageal reflux disease   • Essential hypertension   • Peripheral neuropathy   • Acoustic neuroma (CMS/HCC)   • Malignant neoplasm of overlapping sites of right female breast (CMS/HCC)   • Vitamin B12 deficiency   • Radial artery aneurysm, left (CMS/HCC)       Advance Care Planning:  no living will on file    Identification of Risk Factors:  Risk factors include: cardiovascular risk.    Review of Systems    Compared to one year ago, the patient feels her physical health is the same.  Compared to one year ago, the patient feels her mental health is the same.    Objective     Physical Exam    Vitals:    08/16/18 1031   BP: 137/77   Pulse: 84   Temp: 97.1 °F (36.2 °C)   TempSrc: Oral   SpO2: 98%   Weight: 65.8 kg (145 lb 1.6 oz)   Height: 160 cm (62.99\")       Patient's Body mass index is 25.71 kg/m². BMI is within normal parameters. No follow-up required.      Assessment/Plan   Patient Self-Management and Personalized Health Advice  The patient has been provided with information about: " exercise, designing advance directives and mental health concerns and preventive services including:   · Advance directive, Pneumococcal vaccine .  · Prevnar today      Visit Diagnoses:    ICD-10-CM ICD-9-CM   1. Medicare annual wellness visit, subsequent Z00.00 V70.0   2. Anxiety F41.9 300.00   3. Benzodiazepine dependence (CMS/HCC) F13.20 304.10   4. Malignant neoplasm of overlapping sites of right breast in female, estrogen receptor positive (CMS/HCC) C50.811 174.8    Z17.0 V86.0   5. Acoustic neuroma (CMS/HCC) D33.3 225.1   6. Essential hypertension I10 401.9   7. Vitamin B12 deficiency E53.8 266.2   8. Radial artery aneurysm, left (CMS/HCC) I72.1 442.89       Orders Placed This Encounter   Procedures   • Pneumococcal Conjugate Vaccine 13-Valent All   • Ambulatory Referral to Hand Surgery     Referral Priority:   Routine     Referral Type:   Consultation     Referral Reason:   Specialty Services Required     Referred to Provider:   Leobardo Pitt MD     Requested Specialty:   Hand Surgery     Number of Visits Requested:   1       Outpatient Encounter Prescriptions as of 8/16/2018   Medication Sig Dispense Refill   • B Complex Vitamins (VITAMIN-B COMPLEX PO) Take  by mouth daily.     • Cholecalciferol (VITAMIN D) 2000 UNITS capsule Take  by mouth.     • lansoprazole (PREVACID) 30 MG capsule Take 1 capsule by mouth Daily. 90 capsule 1   • LORazepam (ATIVAN) 0.5 MG tablet 1/2 po bid 90 tablet 1   • triamterene-hydrochlorothiazide (DYAZIDE) 37.5-25 MG per capsule Take 1 capsule by mouth Daily. 90 capsule 1   • [DISCONTINUED] LORazepam (ATIVAN) 0.5 MG tablet Take 1 tablet by mouth 2 (Two) Times a Day. 90 tablet 1     No facility-administered encounter medications on file as of 8/16/2018.        Reviewed use of high risk medication in the elderly: yes  Reviewed for potential of harmful drug interactions in the elderly: yes    Follow Up:  No Follow-up on file.     An After Visit Summary and PPPS with all of  these plans were given to the patient.

## 2018-08-16 NOTE — PROGRESS NOTES
"Subjective   Charo Thomas is a 89 y.o. female.     Annual Exam (AWV) and Ganglion Cyst (on left wrist )    History of Present Illness    Hypertension follow up. Doing well with current medication which she is taking as directed. No known high or low blood pressure episodes. No cardiovascular or neurological symptoms. Today's BP: 137/77.      Long-standing anxiety.  Patient takes low-dose lorazepam one half tablet of a 0.5 mg tablet twice a day.  He's she's taken for at least 20 or 30 years.  She has mild dependence.  She definitely has tolerance.  She does not describe depression symptoms.  No sadness.  No hopelessness.  She has normal interest in pleasurable activities.  She describes no near falls.  She describes no sedation.    On the left wrist there is a swollen area that's been pulsating for about 2 months.  It's not really change in size or shape.  Not painful.  No bleeding.  No ecchymosis.  No change in the hand otherwise.  No numbness.    The following portions of the patient's history were reviewed and updated as appropriate: allergies, current medications, past family history, past medical history, past social history, past surgical history and problem list.      Review of Systems   Constitutional: Negative.    Respiratory: Negative.    Cardiovascular: Negative.    Musculoskeletal: Negative for joint swelling.   Neurological: Negative.    Psychiatric/Behavioral: Negative.        Objective   Blood pressure 137/77, pulse 84, temperature 97.1 °F (36.2 °C), temperature source Oral, height 160 cm (62.99\"), weight 65.8 kg (145 lb 1.6 oz), SpO2 98 %.  Physical Exam   Constitutional: No distress.   Cardiovascular: Normal rate.    Pulmonary/Chest: Effort normal.   Musculoskeletal:   The left wrist reveals a swollen pulsatile nontender radial artery.  It appears to be an aneurysm.  It may be a ganglion cyst under the artery pushing it forward.  Neuro vascular tendon status intact.   Skin: She is not diaphoretic. "       Assessment/Plan   Charo was seen today for annual exam and ganglion cyst.    Diagnoses and all orders for this visit:    Medicare annual wellness visit, subsequent    Anxiety  -     LORazepam (ATIVAN) 0.5 MG tablet; 1/2 po bid    Benzodiazepine dependence (CMS/HCC)  -     LORazepam (ATIVAN) 0.5 MG tablet; 1/2 po bid    Malignant neoplasm of overlapping sites of right breast in female, estrogen receptor positive (CMS/HCC)    Acoustic neuroma (CMS/HCC)    Essential hypertension    Vitamin B12 deficiency    Radial artery aneurysm, left (CMS/HCC)  -     Ambulatory Referral to Hand Surgery    Other orders  -     Pneumococcal Conjugate Vaccine 13-Valent All      Hypertension.  Well-controlled next    Anxiety.  Benzodiazepine dependence.  Continue lorazepam at low-dose.  Recommend decreasing to 13 doses a week versus 14 doses.  We'll see how she does with that.  There is no current evidence major depression.  She has had sedation and other adverse effects from SSRIs.    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  Possible superimposed ganglion cyst.  Referral to hand surgery for evaluation.    Follow-up in 6 months for recheck.

## 2018-09-10 RX ORDER — TRIAMTERENE AND HYDROCHLOROTHIAZIDE 37.5; 25 MG/1; MG/1
1 CAPSULE ORAL DAILY
Qty: 90 CAPSULE | Refills: 0 | Status: SHIPPED | OUTPATIENT
Start: 2018-09-10 | End: 2018-12-07 | Stop reason: SDUPTHER

## 2018-09-10 RX ORDER — LANSOPRAZOLE 30 MG/1
30 CAPSULE, DELAYED RELEASE ORAL DAILY
Qty: 90 CAPSULE | Refills: 0 | Status: SHIPPED | OUTPATIENT
Start: 2018-09-10 | End: 2018-12-07 | Stop reason: SDUPTHER

## 2018-12-07 RX ORDER — TRIAMTERENE AND HYDROCHLOROTHIAZIDE 37.5; 25 MG/1; MG/1
CAPSULE ORAL
Qty: 90 CAPSULE | Refills: 1 | Status: SHIPPED | OUTPATIENT
Start: 2018-12-07 | End: 2019-06-06 | Stop reason: SDUPTHER

## 2018-12-07 RX ORDER — LANSOPRAZOLE 30 MG/1
CAPSULE, DELAYED RELEASE ORAL
Qty: 90 CAPSULE | Refills: 1 | Status: SHIPPED | OUTPATIENT
Start: 2018-12-07 | End: 2019-06-06 | Stop reason: SDUPTHER

## 2019-03-14 ENCOUNTER — OFFICE VISIT (OUTPATIENT)
Dept: FAMILY MEDICINE CLINIC | Facility: CLINIC | Age: 84
End: 2019-03-14

## 2019-03-14 VITALS
HEIGHT: 63 IN | DIASTOLIC BLOOD PRESSURE: 71 MMHG | SYSTOLIC BLOOD PRESSURE: 140 MMHG | BODY MASS INDEX: 25.05 KG/M2 | OXYGEN SATURATION: 100 % | WEIGHT: 141.4 LBS | TEMPERATURE: 97 F | HEART RATE: 82 BPM

## 2019-03-14 DIAGNOSIS — F13.20 BENZODIAZEPINE DEPENDENCE (HCC): ICD-10-CM

## 2019-03-14 DIAGNOSIS — F41.1 GENERALIZED ANXIETY DISORDER: ICD-10-CM

## 2019-03-14 DIAGNOSIS — C50.811 MALIGNANT NEOPLASM OF OVERLAPPING SITES OF RIGHT BREAST IN FEMALE, ESTROGEN RECEPTOR POSITIVE (HCC): ICD-10-CM

## 2019-03-14 DIAGNOSIS — I10 ESSENTIAL HYPERTENSION: Primary | ICD-10-CM

## 2019-03-14 DIAGNOSIS — Z17.0 MALIGNANT NEOPLASM OF OVERLAPPING SITES OF RIGHT BREAST IN FEMALE, ESTROGEN RECEPTOR POSITIVE (HCC): ICD-10-CM

## 2019-03-14 PROCEDURE — 99214 OFFICE O/P EST MOD 30 MIN: CPT | Performed by: FAMILY MEDICINE

## 2019-03-14 RX ORDER — LORAZEPAM 0.5 MG/1
TABLET ORAL
Qty: 90 TABLET | Refills: 1 | Status: SHIPPED | OUTPATIENT
Start: 2019-03-14 | End: 2019-09-06 | Stop reason: SDUPTHER

## 2019-03-14 NOTE — PROGRESS NOTES
"Subjective   Charo Thomas is a 89 y.o. female.     Chief Complaint   Patient presents with   • Hypertension     x pt is not fasting   • Knee Pain     left knee        History of Present Illness    15-year history of benzodiazepine dependence.  Probable generalized anxiety disorder.  She was started on the benzodiazepine by her psychiatrist 15 years ago.  She states she was on many other antidepressant medications and other treatments and it did not help.  She takes one half of a 0.5 mg lorazepam twice a day.  Previously she was taking 1 3:30 times a day.  She has had no falls.  No confusion.  She does have some ongoing left knee arthritis.  Osteoarthritis.  She is using CBD oil which seems to help.  Also some occasional baby aspirin.  She does have some anxiety symptoms related to her 's illness and other things \"in the world\".  She does not feel sad or depressed.  Her caregiver states that she does get very anxious sometimes.    Hypertension.  She continues triamterene hydrochlorothiazide.    Breast cancer.  She continues to follow with her oncologist.      The following portions of the patient's history were reviewed and updated as appropriate: allergies, current medications, past family history, past medical history, past social history, past surgical history and problem list.          Review of Systems   Constitutional: Negative.    Respiratory: Negative.    Cardiovascular: Negative.    Musculoskeletal: Negative.    Psychiatric/Behavioral: Negative for decreased concentration. The patient is nervous/anxious.        Objective   Blood pressure 140/71, pulse 82, temperature 97 °F (36.1 °C), temperature source Oral, height 160 cm (62.99\"), weight 64.1 kg (141 lb 6.4 oz), SpO2 100 %.  Physical Exam   Constitutional: She appears well-developed and well-nourished. No distress.   Neck: No thyromegaly present.   Cardiovascular: Normal rate, regular rhythm, normal heart sounds and intact distal pulses. "   Pulmonary/Chest: Effort normal and breath sounds normal.   Musculoskeletal: She exhibits no edema.   Skin: Skin is warm and dry.   Psychiatric: She has a normal mood and affect. Her behavior is normal. Judgment and thought content normal.   Nursing note and vitals reviewed.      Assessment/Plan   Charo was seen today for hypertension and knee pain.    Diagnoses and all orders for this visit:    Essential hypertension    Benzodiazepine dependence (CMS/HCC)  -     LORazepam (ATIVAN) 0.5 MG tablet; 1/2 tablet po bid, or per weaning schedule    Generalized anxiety disorder    Malignant neoplasm of overlapping sites of right breast in female, estrogen receptor positive (CMS/HCC)      Hypertension.  Overall well controlled.  Continue current medication as is.    Benzodiazepine dependence long-standing.  Generalized anxiety disorder with questionable efficacy of the benzodiazepine.  I strongly suspect she will do well with a very slow wean.  I am recommending decreasing two doses every month.  Dates of her choice.  This should be about a 2-year weaning process.  To consider low-dose Lexapro in the future.  Patient is aware that benzodiazepines may increase risk of falls, confusion, and other adverse effects.    Breast cancer.  Reportedly in remission.  Continues to follow with her oncologist.    Follow-up in 6 months.

## 2019-04-16 ENCOUNTER — LAB (OUTPATIENT)
Dept: LAB | Facility: HOSPITAL | Age: 84
End: 2019-04-16

## 2019-04-16 ENCOUNTER — OFFICE VISIT (OUTPATIENT)
Dept: ONCOLOGY | Facility: CLINIC | Age: 84
End: 2019-04-16

## 2019-04-16 VITALS
TEMPERATURE: 98 F | WEIGHT: 139.8 LBS | HEIGHT: 63 IN | HEART RATE: 79 BPM | RESPIRATION RATE: 16 BRPM | BODY MASS INDEX: 24.77 KG/M2 | OXYGEN SATURATION: 95 % | SYSTOLIC BLOOD PRESSURE: 149 MMHG | DIASTOLIC BLOOD PRESSURE: 68 MMHG

## 2019-04-16 DIAGNOSIS — C50.811 MALIGNANT NEOPLASM OF OVERLAPPING SITES OF RIGHT BREAST IN FEMALE, ESTROGEN RECEPTOR POSITIVE (HCC): Primary | ICD-10-CM

## 2019-04-16 DIAGNOSIS — I72.1 RADIAL ARTERY ANEURYSM, LEFT (HCC): Primary | ICD-10-CM

## 2019-04-16 DIAGNOSIS — Z17.0 MALIGNANT NEOPLASM OF OVERLAPPING SITES OF RIGHT BREAST IN FEMALE, ESTROGEN RECEPTOR POSITIVE (HCC): Primary | ICD-10-CM

## 2019-04-16 LAB
BASOPHILS # BLD AUTO: 0.09 10*3/MM3 (ref 0–0.2)
BASOPHILS NFR BLD AUTO: 1 % (ref 0–1.5)
DEPRECATED RDW RBC AUTO: 41.2 FL (ref 37–54)
EOSINOPHIL # BLD AUTO: 0.62 10*3/MM3 (ref 0–0.4)
EOSINOPHIL NFR BLD AUTO: 6.8 % (ref 0.3–6.2)
ERYTHROCYTE [DISTWIDTH] IN BLOOD BY AUTOMATED COUNT: 12.7 % (ref 12.3–15.4)
HCT VFR BLD AUTO: 41.6 % (ref 34–46.6)
HGB BLD-MCNC: 14.3 G/DL (ref 12–15.9)
IMM GRANULOCYTES # BLD AUTO: 0.07 10*3/MM3 (ref 0–0.05)
IMM GRANULOCYTES NFR BLD AUTO: 0.8 % (ref 0–0.5)
LYMPHOCYTES # BLD AUTO: 2.52 10*3/MM3 (ref 0.7–3.1)
LYMPHOCYTES NFR BLD AUTO: 27.7 % (ref 19.6–45.3)
MCH RBC QN AUTO: 30.3 PG (ref 26.6–33)
MCHC RBC AUTO-ENTMCNC: 34.4 G/DL (ref 31.5–35.7)
MCV RBC AUTO: 88.1 FL (ref 79–97)
MONOCYTES # BLD AUTO: 0.7 10*3/MM3 (ref 0.1–0.9)
MONOCYTES NFR BLD AUTO: 7.7 % (ref 5–12)
NEUTROPHILS # BLD AUTO: 5.1 10*3/MM3 (ref 1.4–7)
NEUTROPHILS NFR BLD AUTO: 56 % (ref 42.7–76)
NRBC BLD AUTO-RTO: 0 /100 WBC (ref 0–0)
PLATELET # BLD AUTO: 230 10*3/MM3 (ref 140–450)
PMV BLD AUTO: 10.1 FL (ref 6–12)
RBC # BLD AUTO: 4.72 10*6/MM3 (ref 3.77–5.28)
WBC NRBC COR # BLD: 9.1 10*3/MM3 (ref 3.4–10.8)

## 2019-04-16 PROCEDURE — 36415 COLL VENOUS BLD VENIPUNCTURE: CPT | Performed by: INTERNAL MEDICINE

## 2019-04-16 PROCEDURE — 85025 COMPLETE CBC W/AUTO DIFF WBC: CPT | Performed by: INTERNAL MEDICINE

## 2019-04-16 PROCEDURE — 99214 OFFICE O/P EST MOD 30 MIN: CPT | Performed by: INTERNAL MEDICINE

## 2019-04-16 NOTE — PROGRESS NOTES
REASONS FOR FOLLOWUP:      1.    History of stage I breast cancer on the right, invasive status post lumpectomy and radiation therapy.  The patient could not accomplish adjuv  ant hormonal therapy.     2. History of vi  tamin B12 deficiency that has been corrected with sublingual vitamin B12 that will remain ongoing at a dose of 1000 mcg every day.    3. Psychiatric illness.  The patient at this time remains and looks compensated.        HISTORY OF PRESENT ILLNESS: This patient is here today stating that she has been under stress because she wants to live in a smaller apartment or condo where life can become safer and simpler. Her  is not in the same page to this and this has produced some clash and they are working out this process at this time. It seems eventually the process is going to go through and it will make the life actually for both of them much better. In any event, otherwise she has a good appetite. She continues cooking. Her weight is stable. Her bowel function is normal. Urination is normal. She has turned 89 years old and in my opinion, there is no need for her to have any more mammograms. She continues using her walker. Her depression is under good control as well as her anxiety. She has no bowel or urinary difficulties. She has no respiratory or cardiovascular issues. Neurologically she remains intact. Her chronic psychiatric problems remain under good control with medications.                  PAST MEDICAL HISTORY:        1.  Hypertension, on therapy for this.       2. Hyperlipidemia with just diet therapy.      3.   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  No Gaytan's esophagus was found.  She was found to have hyperplastic polyps in the stomach with no atypia.  Duodenum was normal.      4. H. pyloric infection.      5. Colonoscopy documented diverticular disease of colon.      6. Past history of migraines but none for a long time.           HEMATOLOGIC/ONCOLOGIC HISTORY:  History from previous dates can be found in the separate document.         On 2015 she has no clinical evidence of recurrence of her breast cancer.    Bilateral breast examination was normal and she has otherwise a negative examination.  We advised her to remain in observation.  We went ahead and requested information in regard to the MRI of the brain to further delineate what was discovered and the co  nsultation by Dr. Limon in regard to ENT assessment.        MEDICATIONS:  The current medication list was reviewed with the patient and updates in the EMR this date per the medical assistant.  Medication dosages and frequencies were confirmed to be accurate        ALLERGIES:        1.;  SULFA.    2.; PENICILLIN.     3.; EPINEPHRINE.     4.; CORTISONE.         SOCIAL HISTORY:   The patient is  and lives with  here in [place].  She worked for [employer]; she has been retired for 25 years.  She remains physically and mentally ac  tive and is active with her Religion.  She does not smoke or drink.          FAMILY HISTORY:   Father  of old age at 85. Mother  with dementia at age 82 but had breast removed at age 81 for breast cancer.  The patient had no siblings.   is in good h  ealth.  Her only son was diagnosed with a brain tumor.  I (Dr. Edmond) saw him a year ago and he has been treated at [facility].  Two maternal aunts developed breast cancer; one was age 49 and the other age 70.  Both  at age 90; deaths not due to   consequence of breast cancer.         REVIEW OF SYSTEMS:         General: no fever, no chills, no fatigue,no weight changes, no lack of appetite.  Eyes: no epiphora, xerophthalmia,conjunctivitis, pain, glaucoma, blurred vision, blindness, secretion, photophobia, proptosis, diplopia.  Ears: no otorrhea, tinnitus, otorrhagia, deafness, pain, vertigo.  Nose: no rhinorrhea, no epistaxis, no alteration in perception of  "odors, no sinuses pressure.  Mouth: no alteration in gums or teeth,  No ulcers, no difficulty with mastication or deglut ion, no odynophagia.  Neck: no masses or pain, no thyroid alterations, no pain in muscles or arteries, no carotid odynia, no crepitation.  Respiratory: no cough, no sputum production,no dyspnea,no trepopnea, no pleuritic pain,no hemoptysis.  Heart: no syncope, no irregularity, no palpitations, no angina,no orthopnea,no paroxysmal nocturnal dyspnea.  Vascular Venous: no tenderness,no edema,no palpable cords,no postphlebitic syndrome, no skin changes no ulcerations.  Vascular Arterial: no distal ischemia, noclaudication, no gangrene, no neuropathic ischemic pain, no skin ulcers, no paleness no cyanosis.  GI: no dysphagia, no odynophagia, no regurgitation, no heartburn,no indigestion,no nausea,no vomiting,no hematemesis ,no melena,no jaundice,no distention, no obstipation,no enterorrhagia,no proctalgia,no anal  lesions, no changes in bowel habits.  : no frequency, no hesitancy, no hematuria, no discharge,no  pain.  Musculoskeletal: no muscle or tendon pain or inflammation,no  joint pain, no edema, no functional limitation,no fasciculations, no mass.  Neurologic: no headache, no seizures, noalterations on Craneal nerves, no motor deficit, no sensory deficit, normal coordination, no alteration in memory,normal orientation, calculation,normal writting, verbal and written language.  Skin: no rashes,no pruritus no localized lesions.  Psychiatric: no anxiety, no depression,no agitation, no delusions, proper insight.         VITAL SIGNS:   Vitals:    19 1528   BP: 149/68   Pulse: 79   Resp: 16   Temp: 98 °F (36.7 °C)   SpO2: 95%   Weight: 63.4 kg (139 lb 12.8 oz)   Height: 159 cm (62.6\")     PAIN: 0 out of 10     ECO         PHYSICAL EXAMINATION:    GENERAL:  Well-developed, well-nourished  Patient  in no acute distress. USING A WALKER  SKIN:  Warm, dry ,NO rashes,NO purpura ,NO " petechiae.  HEENT:  Pupils were equal and reactive to light and accomodation, conjunctivas non injected, no pterigion, normal extraocular movements, normal visual acuity.   Mouth mucosa was moist, no exudates in oropharynx, normal gum line, normal roof of the mouth and pillars, normal papillations of the tongue.  NECK:  Supple with good range of motion; no thyromegaly or masses, no JVD or bruits, no cervical adenopathies.No carotid arteries pain, no carotid abnormal pulsation , NO arterial dance.  LYMPHATICS:  No cervical, NO supraclavicular, NO axillary,NO epitrochlear , NO inguinal adenopathy.  CHEST:  Normal excursion of both patrice thoraces, normal voice fremitus, no subcutaneous emphysema, normal axillas, no rashes or acanthosis nigricans. Lungs clear to percussion and auscultation, normal breath sounds bilaterally, no wheezing,NO crackles NO ronchi, NO stridor, NO rubs.  CARDIAC AND VASCULAR:  normal rate and regular rhythm, without murmurs,NO rubs NO S3 NO S4 right or left . Normal femoral, popliteal, pedis, brachial and carotid pulses.  INSPECTION of  breast documented symmetry of the tissue per se and location and size of the nipple,no retractions or inversion of the nipple, normal skin without lesions, no erythema or nodules,no paud'orange, no prominence of superficial veins or chest wall collateral circulation.PALPATION of the breast documented normal skin turgor, no induration, alteration in local temperature, or pain, no palpable masses or nodules, normal mobility of the tissues,no fixation of the tissue or parenchyma to the chest wall, no alteration at the tail of the breasts or axillas, no adenopathies. Surgical site was well healed.No lymphedema in either extremity  ABDOMEN:  Soft, nontender with no organomegaly or masses, no ascites, no collateral circulation,no distention,no [place] sign, no abdominal pain, no inguinal hernias,no umbilical hernia, no abdominal bruits. Normal bowel sounds.  GENITAL:  Not  Performed.  EXTREMITIES  AND SPINE:  No clubbing, cyanosis or edema, no deformities or pain .No kyphosis, scoliosis, deformities or pain in spine, ribs or pelvic bone.  NEUROLOGICAL:  Patient was awake, alert, oriented to time, person and place.             LABORATORY DATA:HISTORY: 89 year-old asymptomatic female status post prior right  lumpectomy     EXAMINATION: Bilateral digital mammography with R2 computer aided  detection and bilateral digital Tomosynthesis     FINDINGS: Bilateral digital CC and MLO mammographic and digital  Tomosynthesis images were obtained. Comparison is made to prior studies  dated 7/19/2017 and 7/13/2016 . Scattered fibroglandular densities are  seen throughout both breasts in a pattern which is unchanged. I see no  new or dominant masses, areas of nonpostsurgical architectural  distortion or skin thickening. Stable benign postsurgical architectural  distortion of the right breast is noted. There is no evidence for  axillary lymphadenopathy or nipple retraction.     IMPRESSION:  1. There is no evidence for malignancy or significant change in either  breast. Routine followup mammography is recommended.     BI-RADS category 2: Benign.     This report was finalized on 7/23/2018 3:05 PM by Dr. Fernandez Richardson M.D.     Auto Differential   Order: [id number] - Part of Panel Order [id number]   Status:  Final result   Visible to patient:  No (Not Released)   Dx:  Radial artery aneurysm, left (CMS/HCC)    Ref Range & Units 15:16   WBC 3.40 - 10.80 10*3/mm3 9.10    RBC 3.77 - 5.28 10*6/mm3 4.72    Hemoglobin 12.0 - 15.9 g/dL 14.3    Hematocrit 34.0 - 46.6 % 41.6    MCV 79.0 - 97.0 fL 88.1    MCH 26.6 - 33.0 pg 30.3    MCHC 31.5 - 35.7 g/dL 34.4    RDW 12.3 - 15.4 % 12.7    RDW-SD 37.0 - 54.0 fl 41.2    MPV 6.0 - 12.0 fL 10.1    Platelets 140 - 450 10*3/mm3 230    Neutrophil % 42.7 - 76.0 % 56.0    Lymphocyte % 19.6 - 45.3 % 27.7    Monocyte % 5.0 - 12.0 % 7.7    Eosinophil % 0.3 - 6.2 % 6.8  Abnormally high     Basophil % 0.0 - 1.5 % 1.0    Immature Grans % 0.0 - 0.5 % 0.8 Abnormally high     Neutrophils, Absolute 1.40 - 7.00 10*3/mm3 5.10    Lymphocytes, Absolute 0.70 - 3.10 10*3/mm3 2.52    Monocytes, Absolute 0.10 - 0.90 10*3/mm3 0.70    Eosinophils, Absolute 0.00 - 0.40 10*3/mm3 0.62 Abnormally high     Basophils, Absolute 0.00 - 0.20 10*3/mm3 0.09    Immature Grans, Absolute 0.00 - 0.05 10*3/mm3 0.07 Abnormally high     nRBC 0.0 - 0.0 /100 WBC 0.0    Resulting Agency   [facility]         Specimen Collected: 04/16/19 15:16 Last Resulted: 04/16/19 15:25        Lab Flowsheet      Order Details      View Encounter      Lab and Collection Details      Routing      Result History               Clinical Indications      ICD-10-CM ICD-9-CM   Radial artery aneurysm, left (CMS/HCC)  - Primary     I72.1 442.89       CBC & Differential (Order [id number])   Linked Results     Procedure Abnormality Status   CBC Auto Differential Abnormal Final result       CBC Auto Differential (Order [id number])   Additional Information     Specimen ID Bill Type Client ID   [id number]            Specimen Date Taken Specimen Time Taken Specimen Received Date Specimen Received Time Result Date Result Time   Apr 16, 2019  3:16 PM   Apr 16, 2019  3:25 PM   Routing History     Priority Sent On From To Message Type    4/16/2019  3:25 PM Lab, Background User Lalo Edmond MD Results   Results Routing Tracking     View Results Routing Information                        ASSESSMENT/PLAN:     1.    This patient has remote history of  Right upper outer quadrant breast cancer. I find nothing to suggest recurrent disease. Mammogram is normal and breasts examination today  not showing any new abnormalities.     2. The patient has history of vitamin B12 deficiency.  This has been correc  venkata with IM vitamin B12 and she remains on a program of sublingual vitamin B12 supplementation 1000 mcg a day that she will remain ongoing for the time  being.        The patient has turned 89 years old. I find no need for her to have any more mammograms. Her breast examination today is normal and it will be okay for her to proceed with a breast examination during the next visit in 1 year.     Given her transitional situation and anxiety related to the need for her to go to a facility where she will have more help and less danger in regard to going down the steps and doing laundry and decrease the need for cooking and be able to have more social life and interaction in the long run this could be a renewable situation for her and her . She has the money to pay for the condo from savings that she did most of her life and she is going to proceed with this. I empower her to make this transition that I think will make the life of both of them a lot easier and better.     Otherwise, I will review her back in a year.     We are going to go ahead and cancel her future mammograms. There is no need for this at age 89 anymore.

## 2019-06-07 RX ORDER — LANSOPRAZOLE 30 MG/1
CAPSULE, DELAYED RELEASE ORAL
Qty: 90 CAPSULE | Refills: 1 | Status: SHIPPED | OUTPATIENT
Start: 2019-06-07 | End: 2019-11-29 | Stop reason: SDUPTHER

## 2019-06-07 RX ORDER — TRIAMTERENE AND HYDROCHLOROTHIAZIDE 37.5; 25 MG/1; MG/1
CAPSULE ORAL
Qty: 90 CAPSULE | Refills: 1 | Status: SHIPPED | OUTPATIENT
Start: 2019-06-07 | End: 2019-11-29 | Stop reason: SDUPTHER

## 2019-09-06 DIAGNOSIS — F13.20 BENZODIAZEPINE DEPENDENCE (HCC): ICD-10-CM

## 2019-09-06 RX ORDER — LORAZEPAM 0.5 MG/1
TABLET ORAL
Qty: 60 TABLET | Refills: 0 | Status: SHIPPED | OUTPATIENT
Start: 2019-09-06 | End: 2019-11-29 | Stop reason: SDUPTHER

## 2019-09-16 ENCOUNTER — OFFICE VISIT (OUTPATIENT)
Dept: FAMILY MEDICINE CLINIC | Facility: CLINIC | Age: 84
End: 2019-09-16

## 2019-09-16 VITALS
WEIGHT: 133.4 LBS | OXYGEN SATURATION: 98 % | TEMPERATURE: 97.1 F | HEART RATE: 84 BPM | HEIGHT: 63 IN | SYSTOLIC BLOOD PRESSURE: 143 MMHG | BODY MASS INDEX: 23.64 KG/M2 | DIASTOLIC BLOOD PRESSURE: 74 MMHG

## 2019-09-16 DIAGNOSIS — D33.3 ACOUSTIC NEUROMA (HCC): ICD-10-CM

## 2019-09-16 DIAGNOSIS — M17.12 PRIMARY OSTEOARTHRITIS OF LEFT KNEE: ICD-10-CM

## 2019-09-16 DIAGNOSIS — Z17.0 MALIGNANT NEOPLASM OF OVERLAPPING SITES OF RIGHT BREAST IN FEMALE, ESTROGEN RECEPTOR POSITIVE (HCC): ICD-10-CM

## 2019-09-16 DIAGNOSIS — Z00.00 MEDICARE ANNUAL WELLNESS VISIT, SUBSEQUENT: Primary | ICD-10-CM

## 2019-09-16 DIAGNOSIS — F13.20 BENZODIAZEPINE DEPENDENCE (HCC): ICD-10-CM

## 2019-09-16 DIAGNOSIS — I10 ESSENTIAL HYPERTENSION: ICD-10-CM

## 2019-09-16 DIAGNOSIS — C50.811 MALIGNANT NEOPLASM OF OVERLAPPING SITES OF RIGHT BREAST IN FEMALE, ESTROGEN RECEPTOR POSITIVE (HCC): ICD-10-CM

## 2019-09-16 PROCEDURE — G0439 PPPS, SUBSEQ VISIT: HCPCS | Performed by: FAMILY MEDICINE

## 2019-09-16 PROCEDURE — 99214 OFFICE O/P EST MOD 30 MIN: CPT | Performed by: FAMILY MEDICINE
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
## 2019-09-16 NOTE — PROGRESS NOTES
The ABCs of the Annual Wellness Visit  Subsequent Medicare Wellness Visit    Chief Complaint   Patient presents with   • Hypertension     pt is not fasting   • Medicare Wellness-subsequent     pt is due        Subjective   History of Present Illness:  Charo Thomas is a 89 y.o. female who presents for a Subsequent Medicare Wellness Visit.    HEALTH RISK ASSESSMENT    Recent Hospitalizations:  No hospitalization(s) within the last year.    Current Medical Providers:  Patient Care Team:  Gal Ortega MD as PCP - General (Family Medicine)  Raul Macdonald MD as PCP - Family Medicine  Lalo Edmond MD as Consulting Physician (Hematology and Oncology)  Marvin Tello MD as Referring Physician (Breast Surgery)    Smoking Status:  Social History     Tobacco Use   Smoking Status Never Smoker   Smokeless Tobacco Never Used       Alcohol Consumption:  Social History     Substance and Sexual Activity   Alcohol Use No       Depression Screen:   PHQ-2/PHQ-9 Depression Screening 9/16/2019   Little interest or pleasure in doing things 0   Feeling down, depressed, or hopeless 0   Total Score 0       Fall Risk Screen:  STEADI Fall Risk Assessment has not been completed.    Health Habits and Functional and Cognitive Screening:  Functional & Cognitive Status 9/16/2019   Do you have difficulty preparing food and eating? No   Do you have difficulty bathing yourself, getting dressed or grooming yourself? No   Do you have difficulty using the toilet? No   Do you have difficulty moving around from place to place? No   Do you have trouble with steps or getting out of a bed or a chair? No   Current Diet Well Balanced Diet   Dental Exam Up to date   Eye Exam Up to date   Exercise (times per week) 0 times per week   Current Exercise Activities Include None   Do you need help using the phone?  No   Are you deaf or do you have serious difficulty hearing?  No   Do you need help with transportation? Yes   Do you need help shopping?  Yes   Do you need help preparing meals?  No   Do you need help with housework?  No   Do you need help with laundry? No   Do you need help taking your medications? No   Do you need help managing money? No   Do you ever drive or ride in a car without wearing a seat belt? No   Have you felt unusual stress, anger or loneliness in the last month? No   Who do you live with? Alone   If you need help, do you have trouble finding someone available to you? No   Have you been bothered in the last four weeks by sexual problems? No   Do you have difficulty concentrating, remembering or making decisions? Yes         Does the patient have evidence of cognitive impairment? No    Asprin use counseling:Does not need ASA (and currently is not on it)    Age-appropriate Screening Schedule:  Refer to the list below for future screening recommendations based on patient's age, sex and/or medical conditions. Orders for these recommended tests are listed in the plan section. The patient has been provided with a written plan.    Health Maintenance   Topic Date Due   • TDAP/TD VACCINES (1 - Tdap) 04/03/1948   • ZOSTER VACCINE (1 of 2) 04/03/1979   • LIPID PANEL  10/28/2016   • DXA SCAN  01/02/2018   • INFLUENZA VACCINE  08/01/2019   • MAMMOGRAM  07/20/2020   • PNEUMOCOCCAL VACCINES (65+ LOW/MEDIUM RISK)  Completed          The following portions of the patient's history were reviewed and updated as appropriate: allergies, current medications, past family history, past medical history, past social history, past surgical history and problem list.    Outpatient Medications Prior to Visit   Medication Sig Dispense Refill   • B Complex Vitamins (VITAMIN-B COMPLEX PO) Take  by mouth daily.     • Cholecalciferol (VITAMIN D) 2000 UNITS capsule Take  by mouth.     • lansoprazole (PREVACID) 30 MG capsule TAKE 1 CAPSULE BY MOUTH ONCE DAILY 90 capsule 1   • LORazepam (ATIVAN) 0.5 MG tablet TAKE 1/2 (ONE-HALF) TABLET BY MOUTH TWICE DAILY OR PER WEANING  "SCHEDULE 60 tablet 0   • triamterene-hydrochlorothiazide (DYAZIDE) 37.5-25 MG per capsule TAKE 1 CAPSULE BY MOUTH ONCE DAILY 90 capsule 1     No facility-administered medications prior to visit.        Patient Active Problem List   Diagnosis   • Anxiety   • Benzodiazepine dependence (CMS/HCC)   • Generalized anxiety disorder   • Gastroesophageal reflux disease   • Essential hypertension   • Peripheral neuropathy   • Acoustic neuroma (CMS/HCC)   • Malignant neoplasm of overlapping sites of right breast in female, estrogen receptor positive (CMS/HCC)   • Vitamin B12 deficiency   • Radial artery aneurysm, left (CMS/HCC)       Advanced Care Planning:  Patient has an advance directive - a copy has not been provided. Have asked the patient to send this to us to add to record    Review of Systems    Compared to one year ago, the patient feels her physical health is the same.  Compared to one year ago, the patient feels her mental health is the same.    Reviewed chart for potential of high risk medication in the elderly: yes  Reviewed chart for potential of harmful drug interactions in the elderly:yes    Objective         Vitals:    09/16/19 1104   BP: 143/74   Pulse: 84   Temp: 97.1 °F (36.2 °C)   TempSrc: Oral   SpO2: 98%   Weight: 60.5 kg (133 lb 6.4 oz)   Height: 159 cm (62.6\")       Body mass index is 23.94 kg/m².  Discussed the patient's BMI with her. The BMI is in the acceptable range.    Physical Exam          Assessment/Plan   Medicare Risks and Personalized Health Plan  CMS Preventative Services Quick Reference  Immunizations Discussed/Encouraged (specific immunizations; Shingrix )    The above risks/problems have been discussed with the patient.  Pertinent information has been shared with the patient in the After Visit Summary.  Follow up plans and orders are seen below in the Assessment/Plan Section.    Diagnoses and all orders for this visit:    1. Medicare annual wellness visit, subsequent (Primary)    2. " Essential hypertension    3. Acoustic neuroma (CMS/HCC)    4. Malignant neoplasm of overlapping sites of right breast in female, estrogen receptor positive (CMS/HCC)    5. Benzodiazepine dependence (CMS/HCC)      Follow Up:  Return in about 6 months (around 3/16/2020) for Recheck.     An After Visit Summary and PPPS were given to the patient.

## 2019-11-29 DIAGNOSIS — F13.20 BENZODIAZEPINE DEPENDENCE (HCC): ICD-10-CM

## 2019-12-02 RX ORDER — TRIAMTERENE AND HYDROCHLOROTHIAZIDE 37.5; 25 MG/1; MG/1
CAPSULE ORAL
Qty: 90 CAPSULE | Refills: 1 | Status: SHIPPED | OUTPATIENT
Start: 2019-12-02 | End: 2020-06-01

## 2019-12-02 RX ORDER — LORAZEPAM 0.5 MG/1
TABLET ORAL
Qty: 60 TABLET | Refills: 1 | Status: SHIPPED | OUTPATIENT
Start: 2019-12-02 | End: 2020-04-05

## 2019-12-02 RX ORDER — LANSOPRAZOLE 30 MG/1
CAPSULE, DELAYED RELEASE ORAL
Qty: 90 CAPSULE | Refills: 1 | Status: SHIPPED | OUTPATIENT
Start: 2019-12-02 | End: 2020-06-01

## 2020-04-03 DIAGNOSIS — F13.20 BENZODIAZEPINE DEPENDENCE (HCC): ICD-10-CM

## 2020-04-05 RX ORDER — LORAZEPAM 0.5 MG/1
TABLET ORAL
Qty: 60 TABLET | Refills: 0 | Status: SHIPPED | OUTPATIENT
Start: 2020-04-05 | End: 2020-06-01

## 2020-05-19 ENCOUNTER — OFFICE VISIT (OUTPATIENT)
Dept: FAMILY MEDICINE CLINIC | Facility: CLINIC | Age: 85
End: 2020-05-19

## 2020-05-19 DIAGNOSIS — I10 ESSENTIAL HYPERTENSION: Primary | ICD-10-CM

## 2020-05-19 PROCEDURE — 99213 OFFICE O/P EST LOW 20 MIN: CPT | Performed by: FAMILY MEDICINE

## 2020-05-19 NOTE — PROGRESS NOTES
Subjective   Charo Thomas is a 89 y.o. female.     Chief Complaint   Patient presents with   • Hypertension        History of Present Illness    Coronavirus pandemic telehealth visit.  No video available.  Audio only.    You have chosen to receive care through a telehealth visit.  Do you consent to use a video/audio connection for your medical care today? Yes    Hypertension.  Previously mostly well controlled.  Last blood pressure check was 140/70 at the urgent care when she had some ear issues.  Wax issues.  She continues the triamterene hydrochlorothiazide without urinary issues or other problems.    Generalized anxiety.  Long-term benzodiazepine dependence.  We attempted to wean her off the lorazepam 0.5 mg, 1/2 tablet twice a day, but she was unable to do so.  She is been taking it for about 30 or 40 years.  She states it helps her sleep.  She states she has no sedation or trouble walking or dizziness with it.      The following portions of the patient's history were reviewed and updated as appropriate: allergies, current medications, past family history, past medical history, past social history, past surgical history and problem list.          Review of Systems   Constitutional: Negative.    Respiratory: Negative.    Cardiovascular: Negative.    Neurological: Negative.    Psychiatric/Behavioral: Negative.        Objective   There were no vitals taken for this visit.  Physical Exam   Constitutional: She is oriented to person, place, and time. No distress.   Patient is well-known to me.  No acute distress.   Pulmonary/Chest: Effort normal. No respiratory distress.   Neurological: She is alert and oriented to person, place, and time.   Psychiatric: She has a normal mood and affect. Her behavior is normal.       Assessment/Plan   Charo was seen today for hypertension.    Diagnoses and all orders for this visit:    Essential hypertension      Hypertension.  Overall well controlled.  Continue  Dyazide.    Anxiety.  Decades long history of benzodiazepine dependence at low dose.  Attempts to wean have not been successful.  She is completely tolerant of this low dose benzodiazepine.  She is aware of long-term risks.  She understands to contact us with sedation, trouble walking, dizziness, or confusion.    Follow-up with me in October or November of this year for annual wellness visit and recheck.    Duration of visit approximately 10 minutes

## 2020-05-31 DIAGNOSIS — F13.20 BENZODIAZEPINE DEPENDENCE (HCC): ICD-10-CM

## 2020-06-01 RX ORDER — LORAZEPAM 0.5 MG/1
TABLET ORAL
Qty: 60 TABLET | Refills: 0 | Status: SHIPPED | OUTPATIENT
Start: 2020-06-01 | End: 2020-08-03

## 2020-06-01 RX ORDER — TRIAMTERENE AND HYDROCHLOROTHIAZIDE 37.5; 25 MG/1; MG/1
CAPSULE ORAL
Qty: 90 CAPSULE | Refills: 0 | Status: SHIPPED | OUTPATIENT
Start: 2020-06-01 | End: 2020-09-02

## 2020-06-01 RX ORDER — LANSOPRAZOLE 30 MG/1
CAPSULE, DELAYED RELEASE ORAL
Qty: 90 CAPSULE | Refills: 0 | Status: SHIPPED | OUTPATIENT
Start: 2020-06-01 | End: 2020-09-02

## 2020-08-03 DIAGNOSIS — F13.20 BENZODIAZEPINE DEPENDENCE (HCC): ICD-10-CM

## 2020-08-03 RX ORDER — LORAZEPAM 0.5 MG/1
TABLET ORAL
Qty: 60 TABLET | Refills: 0 | Status: SHIPPED | OUTPATIENT
Start: 2020-08-03 | End: 2020-10-01

## 2020-09-02 RX ORDER — TRIAMTERENE AND HYDROCHLOROTHIAZIDE 37.5; 25 MG/1; MG/1
CAPSULE ORAL
Qty: 90 CAPSULE | Refills: 0 | Status: SHIPPED | OUTPATIENT
Start: 2020-09-02 | End: 2020-11-30

## 2020-09-02 RX ORDER — LANSOPRAZOLE 30 MG/1
CAPSULE, DELAYED RELEASE ORAL
Qty: 90 CAPSULE | Refills: 0 | Status: SHIPPED | OUTPATIENT
Start: 2020-09-02 | End: 2020-11-30

## 2020-09-29 DIAGNOSIS — F13.20 BENZODIAZEPINE DEPENDENCE (HCC): ICD-10-CM

## 2020-10-01 RX ORDER — LORAZEPAM 0.5 MG/1
TABLET ORAL
Qty: 60 TABLET | Refills: 0 | Status: SHIPPED | OUTPATIENT
Start: 2020-10-01 | End: 2020-11-30

## 2020-11-27 DIAGNOSIS — F13.20 BENZODIAZEPINE DEPENDENCE (HCC): ICD-10-CM

## 2020-11-30 RX ORDER — LANSOPRAZOLE 30 MG/1
CAPSULE, DELAYED RELEASE ORAL
Qty: 90 CAPSULE | Refills: 1 | Status: SHIPPED | OUTPATIENT
Start: 2020-11-30 | End: 2021-05-13

## 2020-11-30 RX ORDER — LORAZEPAM 0.5 MG/1
TABLET ORAL
Qty: 60 TABLET | Refills: 0 | Status: SHIPPED | OUTPATIENT
Start: 2020-11-30 | End: 2021-02-08

## 2020-11-30 RX ORDER — TRIAMTERENE AND HYDROCHLOROTHIAZIDE 37.5; 25 MG/1; MG/1
CAPSULE ORAL
Qty: 90 CAPSULE | Refills: 1 | Status: SHIPPED | OUTPATIENT
Start: 2020-11-30 | End: 2021-05-13

## 2020-12-01 ENCOUNTER — TELEMEDICINE (OUTPATIENT)
Dept: FAMILY MEDICINE CLINIC | Facility: CLINIC | Age: 85
End: 2020-12-01

## 2020-12-01 DIAGNOSIS — C50.811 MALIGNANT NEOPLASM OF OVERLAPPING SITES OF RIGHT BREAST IN FEMALE, ESTROGEN RECEPTOR POSITIVE (HCC): ICD-10-CM

## 2020-12-01 DIAGNOSIS — F13.20 BENZODIAZEPINE DEPENDENCE (HCC): ICD-10-CM

## 2020-12-01 DIAGNOSIS — I10 ESSENTIAL HYPERTENSION: Primary | ICD-10-CM

## 2020-12-01 DIAGNOSIS — F41.1 GENERALIZED ANXIETY DISORDER: ICD-10-CM

## 2020-12-01 DIAGNOSIS — Z17.0 MALIGNANT NEOPLASM OF OVERLAPPING SITES OF RIGHT BREAST IN FEMALE, ESTROGEN RECEPTOR POSITIVE (HCC): ICD-10-CM

## 2020-12-01 DIAGNOSIS — D33.3 ACOUSTIC NEUROMA (HCC): ICD-10-CM

## 2020-12-01 PROCEDURE — 99214 OFFICE O/P EST MOD 30 MIN: CPT | Performed by: FAMILY MEDICINE

## 2020-12-01 NOTE — PROGRESS NOTES
Subjective   Charo Thomas is a 89 y.o. female.     Chief Complaint   Patient presents with   • Hypertension   • Anxiety        History of Present Illness    You have chosen to receive care through a telehealth visit.  Do you consent to use a video/audio connection for your medical care today? Yes    Coronavirus pandemic telehealth visit.  Face time.  Excellent A/V connection.    Hypertension.  She been checking her blood pressure at home.  Recently 127/72.  She has had no lightheadedness or dizziness.  She has not had some blood work for her kidney function potassium in some time.    Breast cancer.  Followed by oncology.  She is not seen anybody for some time because of the coronavirus pandemic.  Her breast cancer is remote.    Acoustic neuroma.  No dizziness.  He has not seen her ENT in some time.  No hearing changes.    Benzodiazepine dependence with a history of anxiety.  Longstanding.  She has been on lorazepam for a number of years.  She was on 0.5 mg twice a day.  And then about a year ago we will switch down to one half of a 0.5 mg tablet twice a day.  Is not having trouble with depression.  She is had no falls.  No reported trouble with memory.        The following portions of the patient's history were reviewed and updated as appropriate: allergies, current medications, past family history, past medical history, past social history, past surgical history and problem list.          Review of Systems   Constitutional: Negative.    Respiratory: Negative.    Cardiovascular: Negative.    Neurological: Negative for dizziness.   Psychiatric/Behavioral: Negative.        Objective   There were no vitals taken for this visit.  Physical Exam  HENT:      Head: Atraumatic.   Neck:      Musculoskeletal: Normal range of motion.   Pulmonary:      Effort: Pulmonary effort is normal. No respiratory distress.   Skin:     Coloration: Skin is not pale.   Neurological:      Mental Status: She is alert and oriented to person,  place, and time.      Coordination: Coordination normal.   Psychiatric:         Behavior: Behavior normal.         Assessment/Plan   Diagnoses and all orders for this visit:    1. Essential hypertension (Primary)  -     Comprehensive Metabolic Panel; Future    2. Acoustic neuroma (CMS/HCC)    3. Benzodiazepine dependence (CMS/HCC)    4. Malignant neoplasm of overlapping sites of right breast in female, estrogen receptor positive (CMS/HCC)    5. Generalized anxiety disorder      Hypertension.  Well-controlled.  Continue Dyazide.  Check CMP soon.  I will see her in 3 months for wellness visit and recheck.    Acoustic neuroma.  Followed by ENT.    Breast cancer, remote.  Followed by [facility]/oncology group.    Benzodiazepine dependence with history of anxiety.  I recommend continuing and weaning process.  I want her to not take her Wednesday morning dose for the next month.  Otherwise continue to take one half of 8.5 mg lorazepam twice a day.    Duration of visit approximately 20 minutes with 15 minutes of patient contact

## 2021-01-12 ENCOUNTER — LAB (OUTPATIENT)
Dept: LAB | Facility: HOSPITAL | Age: 86
End: 2021-01-12

## 2021-01-12 ENCOUNTER — OFFICE VISIT (OUTPATIENT)
Dept: ONCOLOGY | Facility: CLINIC | Age: 86
End: 2021-01-12

## 2021-01-12 VITALS
TEMPERATURE: 97.5 F | RESPIRATION RATE: 16 BRPM | BODY MASS INDEX: 24.01 KG/M2 | SYSTOLIC BLOOD PRESSURE: 149 MMHG | HEART RATE: 89 BPM | WEIGHT: 135.5 LBS | HEIGHT: 63 IN | OXYGEN SATURATION: 98 % | DIASTOLIC BLOOD PRESSURE: 91 MMHG

## 2021-01-12 DIAGNOSIS — I10 ESSENTIAL HYPERTENSION: ICD-10-CM

## 2021-01-12 DIAGNOSIS — E53.8 VITAMIN B12 DEFICIENCY: ICD-10-CM

## 2021-01-12 DIAGNOSIS — Z17.0 MALIGNANT NEOPLASM OF OVERLAPPING SITES OF RIGHT BREAST IN FEMALE, ESTROGEN RECEPTOR POSITIVE (HCC): Primary | ICD-10-CM

## 2021-01-12 DIAGNOSIS — F41.1 GENERALIZED ANXIETY DISORDER: ICD-10-CM

## 2021-01-12 DIAGNOSIS — C50.811 MALIGNANT NEOPLASM OF OVERLAPPING SITES OF RIGHT BREAST IN FEMALE, ESTROGEN RECEPTOR POSITIVE (HCC): Primary | ICD-10-CM

## 2021-01-12 LAB
ALBUMIN SERPL-MCNC: 4.3 G/DL (ref 3.5–5.2)
ALBUMIN/GLOB SERPL: 1.4 G/DL (ref 1.1–2.4)
ALP SERPL-CCNC: 80 U/L (ref 38–116)
ALT SERPL W P-5'-P-CCNC: 12 U/L (ref 0–33)
ANION GAP SERPL CALCULATED.3IONS-SCNC: 14.2 MMOL/L (ref 5–15)
AST SERPL-CCNC: 20 U/L (ref 0–32)
BASOPHILS # BLD AUTO: 0.04 10*3/MM3 (ref 0–0.2)
BASOPHILS NFR BLD AUTO: 0.5 % (ref 0–1.5)
BILIRUB SERPL-MCNC: 1 MG/DL (ref 0.2–1.2)
BUN SERPL-MCNC: 19 MG/DL (ref 6–20)
BUN/CREAT SERPL: 16.5 (ref 7.3–30)
CALCIUM SPEC-SCNC: 10.5 MG/DL (ref 8.5–10.2)
CHLORIDE SERPL-SCNC: 97 MMOL/L (ref 98–107)
CO2 SERPL-SCNC: 26.8 MMOL/L (ref 22–29)
CREAT SERPL-MCNC: 1.15 MG/DL (ref 0.6–1.1)
DEPRECATED RDW RBC AUTO: 42.1 FL (ref 37–54)
EOSINOPHIL # BLD AUTO: 0.05 10*3/MM3 (ref 0–0.4)
EOSINOPHIL NFR BLD AUTO: 0.6 % (ref 0.3–6.2)
ERYTHROCYTE [DISTWIDTH] IN BLOOD BY AUTOMATED COUNT: 12.5 % (ref 12.3–15.4)
GFR SERPL CREATININE-BSD FRML MDRD: 44 ML/MIN/1.73
GLOBULIN UR ELPH-MCNC: 3.1 GM/DL (ref 1.8–3.5)
GLUCOSE SERPL-MCNC: 106 MG/DL (ref 74–124)
HCT VFR BLD AUTO: 43.4 % (ref 34–46.6)
HGB BLD-MCNC: 14.4 G/DL (ref 12–15.9)
IMM GRANULOCYTES # BLD AUTO: 0.02 10*3/MM3 (ref 0–0.05)
IMM GRANULOCYTES NFR BLD AUTO: 0.2 % (ref 0–0.5)
LYMPHOCYTES # BLD AUTO: 1.58 10*3/MM3 (ref 0.7–3.1)
LYMPHOCYTES NFR BLD AUTO: 19.5 % (ref 19.6–45.3)
MCH RBC QN AUTO: 30.3 PG (ref 26.6–33)
MCHC RBC AUTO-ENTMCNC: 33.2 G/DL (ref 31.5–35.7)
MCV RBC AUTO: 91.2 FL (ref 79–97)
MONOCYTES # BLD AUTO: 0.5 10*3/MM3 (ref 0.1–0.9)
MONOCYTES NFR BLD AUTO: 6.2 % (ref 5–12)
NEUTROPHILS NFR BLD AUTO: 5.92 10*3/MM3 (ref 1.7–7)
NEUTROPHILS NFR BLD AUTO: 73 % (ref 42.7–76)
NRBC BLD AUTO-RTO: 0 /100 WBC (ref 0–0.2)
PLATELET # BLD AUTO: 255 10*3/MM3 (ref 140–450)
PMV BLD AUTO: 9.4 FL (ref 6–12)
POTASSIUM SERPL-SCNC: 4.2 MMOL/L (ref 3.5–4.7)
PROT SERPL-MCNC: 7.4 G/DL (ref 6.3–8)
RBC # BLD AUTO: 4.76 10*6/MM3 (ref 3.77–5.28)
SODIUM SERPL-SCNC: 138 MMOL/L (ref 134–145)
WBC # BLD AUTO: 8.11 10*3/MM3 (ref 3.4–10.8)

## 2021-01-12 PROCEDURE — 36415 COLL VENOUS BLD VENIPUNCTURE: CPT

## 2021-01-12 PROCEDURE — 80053 COMPREHEN METABOLIC PANEL: CPT

## 2021-01-12 PROCEDURE — 85025 COMPLETE CBC W/AUTO DIFF WBC: CPT

## 2021-01-12 PROCEDURE — 99214 OFFICE O/P EST MOD 30 MIN: CPT | Performed by: INTERNAL MEDICINE

## 2021-01-12 NOTE — PROGRESS NOTES
REASONS FOR FOLLOWUP:      1.    History of stage I breast cancer on the right, invasive status post lumpectomy and radiation therapy.  The patient could not accomplish adjuv  ant hormonal therapy.     2. History of vi  tamin B12 deficiency that has been corrected with sublingual vitamin B12 that will remain ongoing at a dose of 1000 mcg every day.    3. Psychiatric illness.  The patient at this time remains and looks compensated.        HISTORY OF PRESENT ILLNESS:   PATIENT WAS CALLED THE DAY BEFORE BY THE OFFICE TO ASK FOR SYMPTOMS THAT COULD BE CONSISTENT WITH CORONAVIRUS INFECTION, AND BEING NEGATIVE WAS SCHEDULED TO BE SEEN IN THE OFFICE TODAY. SIMILAR QUESTIONING TODAY INCLUDING, CHILLS, FEVER, NEW COUGH, SHORTNESS OF BREATH, DIARRHEA,DIFFUSE BODY ACHES  AND CHANGES IN SMELL OR TASTE WERE NEGATIVE.THE PATIENT DENIED ANY CONTACT WITH PERSONS WHO WERE POSITIVE FOR COVID, AND PATIENT IS NOT IN CATEGORY OF HIGH RISK BEHAVIOR TO ACQUIRE COVID.    DURING THE VISIT WITH THE PATIENT TODAY , PATIENT HAD FACE MASK, MY MEDICAL ASSISTANT AND I  HAD PROPPER PROTECTIVE EQUIPMENT, AND I DID HAND HYGIENE WITH SOAP AND WATER BEFORE AND AFTER THE VISIT.    This patient returns today to the office in company of her daughter. She has moved to a [place] and she does not have to go up and down steps anymore advice that we gave her to her during the previous visit. She is completely satisfied with this measure. Other than that she has been able to survive the pandemia without any incidents and her family has been extremely supportive. Her appetite is very good in the morning, the rest of the day is a little bit of this and a little bit of that. Her weight remains about the same. She continues using the walker for all activities and she has balance issues especially when she first wakes up. When she takes her blood pressure medication this makes the situation worse but she has not had any falls. Then around noontime she feels  better and her balance is better. She still has an element of neuropathy. She remains on Ativan for anxiety that she takes only 1 dose a day. She has good bowel activity, good urination. No cardiovascular or respiratory issues. Occasional back pain. No new breast issues.       Past Medical History:   Diagnosis Date   • Benign esophageal stricture    • Breast cancer (CMS/HCC) 06/30/2010    Right breast w/papillary features, primary tumor size 1.9 cm w/negative margins   • Breast cancer in male, right 2013    Right breast, stage I, invasive   • Diverticular disease of colon    • H. pylori infection    • H/O Migraines    • Hyperlipidemia    • Hyperplastic polyps of stomach    • Hypertension    • Osteoporosis    • Psychiatric disorder    • Vitamin B12 deficiency      Past Surgical History:   Procedure Laterality Date   • APPENDECTOMY  1944   • BREAST BIOPSY Right 2010   • BREAST LUMPECTOMY     • BREAST LUMPECTOMY WITH SENTINEL NODE BIOPSY Right 2013   • COLONOSCOPY      Diverticular disease   • DILATATION AND CURETTAGE  1957, 1971    x2     Family History   Problem Relation Age of Onset   • Dementia Mother    • Cancer Mother 81        Breast removed   • Cancer Father 69        Throat; smoker   • Cancer Maternal Aunt         2 aunts at age 49 and 70; deaths unrelated to cancer   • Other Son         Brain tumor   • Hypertension Son      Social History     Socioeconomic History   • Marital status:      Spouse name: Marvin   • Number of children: 1   • Years of education: High School   • Highest education level: Not on file   Occupational History     Employer: RETIRED   Tobacco Use   • Smoking status: Never Smoker   • Smokeless tobacco: Never Used   Substance and Sexual Activity   • Alcohol use: No   • Drug use: No     Allergies   Allergen Reactions   • Cortisone    • Diphenhydramine Hcl (Sleep)    • Epinephrine    • Penicillins    • Sulfa Antibiotics      Current Outpatient Medications on File Prior to Visit  "  Medication Sig Dispense Refill   • B Complex Vitamins (VITAMIN-B COMPLEX PO) Take  by mouth daily.     • Cholecalciferol (VITAMIN D) 2000 UNITS capsule Take  by mouth.     • lansoprazole (PREVACID) 30 MG capsule Take 1 capsule by mouth once daily 90 capsule 1   • LORazepam (ATIVAN) 0.5 MG tablet TAKE 1/2 (ONE-HALF) TABLET BY MOUTH TWICE DAILY OR  PER  WEANING  SCHEDULE 60 tablet 0   • triamterene-hydrochlorothiazide (DYAZIDE) 37.5-25 MG per capsule Take 1 capsule by mouth once daily 90 capsule 1     No current facility-administered medications on file prior to visit.                             VITAL SIGNS:   Vitals:    21 1208   BP: 149/91   Pulse: 89   Resp: 16   Temp: 97.5 °F (36.4 °C)   TempSrc: Temporal   SpO2: 98%   Weight: 61.5 kg (135 lb 8 oz)   Height: 159 cm (62.6\")     PAIN: 0 out of 10     ECO         PHYSICAL EXAMINATION:      I HAVE PERSONALLY REVIEWED THE HISTORY OF THE PRESENT ILLNESS, PAST MEDICAL HISTORY, FAMILY HISTORY, SOCIAL HISTORY, ALLERGIES, MEDICATIONS STATED ABOVE IN THE OFFICE NOTE FROM TODAY.        GENERAL:  Well-developed, well-nourished  Patient  in no acute distress.   SKIN:  Warm, dry ,NO rashes,NO purpura ,NO petechiae.  HEENT:  Pupils were equal and reactive to light and accomodation, conjunctivae noninjected, no pterygium, normal extraocular movements, normal visual acuity.   NECK:  Supple with good range of motion; no thyromegaly or masses, no JVD or bruits, no cervical adenopathies.No carotid artery pain, no carotid abnormal pulsation , NO arterial dance.  LYMPHATICS:  No cervical, NO supraclavicular, NO axillary,NO epitrochlear , NO inguinal adenopathy.  CARDIAC   normal rate and regular rhythm, without murmur,NO rubs NO S3 NO S4 right or left . Normal femoral, popliteal, pedis, brachial and carotid pulses.  INSPECTION of  breast documented symmetry of the tissue per se and location and size of the nipple,no retractions or inversion of the nipple, normal skin " without lesions, no erythema or nodules,no peau d'orange, no prominence of superficial veins or chest wall collateral circulation.PALPATION of the breast documented normal skin turgor, no induration, alteration in local temperature, or pain, no palpable masses or nodules, normal mobility of the tissues,no fixation of the tissue or parenchyma to the chest wall, no alteration at the tail of the breasts or axillas, no adenopathies. Surgical site was well healed.No lymphedema in either extremity.  VASCULAR ARTERIAL: normal carotids,brachial,radial,femoral,popliteal, pedis pulses , no bruits.no paleness or cyanosis, no pain, no edema, no numbness, no gangrene.  VASCULAR VENOUS: no cyanosis, collateral circulation, varicosities, edema, palpable cords, pain, erythema.  ABDOMEN:  Soft, nontender with no hepatomegaly, no splenomegaly,no masses, no ascites, no collateral circulation,no distention,no Neel sign, no abdominal pain, no inguinal hernias,no umbilical hernia, no abdominal bruits. Normal bowel sounds.  GENITAL: Not  Performed.  EXTREMITIES  AND SPINE:  No clubbing, cyanosis or edema, no deformities or pain .No kyphosis, scoliosis, deformities or pain in spine, ribs or pelvic bone.  NEUROLOGICAL:  Patient was awake, alert, oriented to time, person and place.             LABORATORY DATAAuto Differential  Order: [id number] - Part of Panel Order [id number]  Status:  Final result   Visible to patient:  No (not released)   Dx:  Malignant neoplasm of overlapping sit...  Specimen Information: Blood        Component   Ref Range & Units 11:55   WBC   3.40 - 10.80 10*3/mm3 8.11    RBC   3.77 - 5.28 10*6/mm3 4.76    Hemoglobin   12.0 - 15.9 g/dL 14.4    Hematocrit   34.0 - 46.6 % 43.4    MCV   79.0 - 97.0 fL 91.2    MCH   26.6 - 33.0 pg 30.3    MCHC   31.5 - 35.7 g/dL 33.2    RDW   12.3 - 15.4 % 12.5    RDW-SD   37.0 - 54.0 fl 42.1    MPV   6.0 - 12.0 fL 9.4    Platelets   140 - 450 10*3/mm3 255    Neutrophil %   42.7 - 76.0 %  73.0    Lymphocyte %   19.6 - 45.3 % 19.5Low     Monocyte %   5.0 - 12.0 % 6.2    Eosinophil %   0.3 - 6.2 % 0.6    Basophil %   0.0 - 1.5 % 0.5    Immature Grans %   0.0 - 0.5 % 0.2    Neutrophils, Absolute   1.70 - 7.00 10*3/mm3                          ASSESSMENT/PLAN:        This patient has remote history of  Right upper outer quadrant breast cancer. I find nothing to suggest recurrent disease. Mammogram 2018 is normal and breasts examination today  not showing any new abnormalities.   I find no reason for this patient of 89 to continue getting mammograms at this point. Her breast examination again today is completely normal.         2.The patient has history of vitamin B12 deficiency.  This has been correc  venkata with IM vitamin B12 and she remains on a program of sublingual vitamin B12 supplementation 1000 mcg a day that she will remain ongoing for the time being.          The patient complains today of dizziness especially when she wakes up in the morning and she feels not vertigo per se but a sensation that is not normal. This happens and gets worse after she takes her blood pressure medicine. With her poor amount of muscle mass I wonder if her blood pressure in the morning is too low and I wonder if it will be better off for her to take her blood pressure medication at lunchtime. I advised her to do this. I advised her to remind herself that her walker is her best friend to minimize potential for fall.    In regard to her chronic anxiety she has had mental illness for all of her life. She takes a minimal amount of Ativan and I find no reason why this could be an issue on her at this point in her life. I do not see this medicine effecting her in a negative way. She has been able to overcome any crisis with this medicine and we know that she has been taking it for a long time and I do not think there is a reason for this to change to any other medication. She does not abuse this. I will review her back in a  year. I find no reason for mammogram or any other radiological analysis.     I advised her that she should get her COVID injection vaccination whenever it becomes available and she should get it in the left shoulder area. We advised her to leave the right shoulder area alone. I discussed all of these facts with the patient and her daughter in the room. The daughter was instrumental in regard to history and issues.     I DISCUSSED WITH PATIENT IN DETAIL FORMS TO DECREASE CHANCES OF CORONAVIRUS INFECTION INCLUDING ISOLATION, PROPER HAND HIGIENE, AVOID PUBLIC PLACES  WITH CROWDS, FOLLOW  CDC RECOMENDATIONS, AND KEEP PERSONAL AND SOCIAL RESPONSIBILITY, WARE A MASK IN PUBLIC PLACES.  PATIENT IS AWARE THIS INFECTION COULD HAVE SEVERE CONSEQUENCES TO PERSONAL HEALTH AND FAMILY RAMIFICATIONS OF THIS.

## 2021-02-07 DIAGNOSIS — F13.20 BENZODIAZEPINE DEPENDENCE (HCC): ICD-10-CM

## 2021-02-08 RX ORDER — LORAZEPAM 0.5 MG/1
TABLET ORAL
Qty: 60 TABLET | Refills: 0 | Status: SHIPPED | OUTPATIENT
Start: 2021-02-08 | End: 2021-05-03

## 2021-03-15 ENCOUNTER — BULK ORDERING (OUTPATIENT)
Dept: CASE MANAGEMENT | Facility: OTHER | Age: 86
End: 2021-03-15

## 2021-03-15 DIAGNOSIS — Z23 IMMUNIZATION DUE: ICD-10-CM

## 2021-03-25 ENCOUNTER — OFFICE VISIT (OUTPATIENT)
Dept: FAMILY MEDICINE CLINIC | Facility: CLINIC | Age: 86
End: 2021-03-25

## 2021-03-25 ENCOUNTER — HOSPITAL ENCOUNTER (OUTPATIENT)
Dept: GENERAL RADIOLOGY | Facility: HOSPITAL | Age: 86
Discharge: HOME | End: 2021-03-25
Admitting: NURSE PRACTITIONER

## 2021-03-25 VITALS
HEART RATE: 88 BPM | RESPIRATION RATE: 16 BRPM | TEMPERATURE: 96.6 F | WEIGHT: 132.1 LBS | HEIGHT: 63 IN | OXYGEN SATURATION: 98 % | BODY MASS INDEX: 23.41 KG/M2 | SYSTOLIC BLOOD PRESSURE: 132 MMHG | DIASTOLIC BLOOD PRESSURE: 78 MMHG

## 2021-03-25 DIAGNOSIS — R51.9 FRONTAL HEADACHE: Primary | ICD-10-CM

## 2021-03-25 PROCEDURE — 99213 OFFICE O/P EST LOW 20 MIN: CPT | Performed by: NURSE PRACTITIONER

## 2021-03-25 PROCEDURE — 70220 X-RAY EXAM OF SINUSES: CPT

## 2021-03-25 NOTE — PROGRESS NOTES
Chief Complaint  Balance Issues (couple of month ) and Headache (pain above her right eyebrow)    Subjective          Charo Thomas presents to [facility]  Charo presents today for balance issues that have been ongoing for the past couple of months    She also reports a headache over her right eye.  Painful daily. States it causes her balance to get off. She is concerned she will have a fall. Headache has been ongoing for a few months. Denies any vision changes. Recent eye exam in the past 6-8 weeks. No increased pressure with bending. Sinuses and allergies have remained unchanged. Uses Normal saline nasal spray in am. States she will wake up and sneeze about 5-10 times. Feels nauseated at times, denies vomiting. Headache is worsening. History of migraines, none in past few years. Will have visual aura. States does not feel similar to previous headaches. Jaw remains sore. Denies hx of TMJ. Does report swallowing difficulties. Gets dizzy when walking, denies history of vertigo. Tried to establish with ENT, but required a referral.     Headache   This is a new problem. The current episode started more than 1 month ago. The problem occurs daily. The problem has been unchanged. The pain is located in the right unilateral region. The pain does not radiate. The pain quality is not similar to prior headaches. The quality of the pain is described as aching. The pain is moderate. Associated symptoms include a loss of balance and photophobia. Pertinent negatives include no abdominal pain, abnormal behavior, anorexia, back pain, blurred vision, coughing, dizziness, drainage, ear pain, eye pain, eye redness, eye watering, facial sweating, fever, hearing loss, insomnia, muscle aches, nausea, neck pain, numbness, phonophobia, rhinorrhea, scalp tenderness, seizures, sinus pressure, sore throat, swollen glands, tingling, tinnitus, visual change, vomiting, weakness or weight loss.       Objective  "  Vital Signs:   /78   Pulse 88   Temp 96.6 °F (35.9 °C) (Temporal)   Resp 16   Ht 159 cm (62.6\")   Wt 59.9 kg (132 lb 1.6 oz)   SpO2 98%   BMI 23.70 kg/m²     Physical Exam  Constitutional:       Appearance: Normal appearance. She is normal weight.   HENT:      Head: Normocephalic and atraumatic.      Right Ear: Tympanic membrane, ear canal and external ear normal. There is no impacted cerumen.      Left Ear: Tympanic membrane, ear canal and external ear normal. There is no impacted cerumen.      Nose:      Right Sinus: Frontal sinus tenderness present.      Mouth/Throat:      Mouth: Mucous membranes are moist.   Eyes:      Conjunctiva/sclera: Conjunctivae normal.      Pupils: Pupils are equal, round, and reactive to light.   Neurological:      Mental Status: She is alert and oriented to person, place, and time.      Cranial Nerves: Cranial nerves are intact. No cranial nerve deficit.      Sensory: No sensory deficit.      Motor: No weakness.      Coordination: Coordination normal. Heel to Shin Test abnormal (due to knee replacement).      Gait: Gait abnormal.        Result Review :                 Assessment and Plan    Diagnoses and all orders for this visit:    1. Frontal headache (Primary)  -     XR sinuses 3+ vw        Follow Up   No follow-ups on file.  Patient was given instructions and counseling regarding her condition or for health maintenance advice. Please see specific information pulled into the AVS if appropriate.     Patient with unilateral frontal headache, over right brow, that has been going on for over a month, possible longer. She does report a history of migraines, but states this is different. There is pain to palpate in the area of concern. Sinus xrays ordered to evaluate for evidence of sinusitis. Consider MRI if negative given gait imbalance. Would consider labs as well if this does not resolve. She has a follow up with her PCP in < 2 weeks. Her bp is wnl, no evidence of volume " depletion. She does have a gait disturbance, ambulating with a walker. Hx of cerumen impaction, however there is moderate wax in her right ear, left is clear, no evidence of impaction.  No evidence of a neurological deficit  Daughter is present during examination and provides some information as well.

## 2021-04-02 ENCOUNTER — OFFICE VISIT (OUTPATIENT)
Dept: FAMILY MEDICINE CLINIC | Facility: CLINIC | Age: 86
End: 2021-04-02

## 2021-04-02 VITALS
SYSTOLIC BLOOD PRESSURE: 140 MMHG | DIASTOLIC BLOOD PRESSURE: 76 MMHG | BODY MASS INDEX: 23.02 KG/M2 | OXYGEN SATURATION: 98 % | TEMPERATURE: 96.9 F | WEIGHT: 129.9 LBS | HEIGHT: 63 IN | HEART RATE: 80 BPM

## 2021-04-02 DIAGNOSIS — R51.9 FRONTAL HEADACHE: Primary | ICD-10-CM

## 2021-04-02 PROCEDURE — 99214 OFFICE O/P EST MOD 30 MIN: CPT | Performed by: FAMILY MEDICINE

## 2021-04-02 NOTE — PROGRESS NOTES
"Chief Complaint  Headache and Medicare Wellness-subsequent    Subjective          Charo Thomas presents to [facility]  History of Present Illness    2 or 3 months of right frontal scalp discomfort.  Right above her right eyebrow.  Getting worse over the last 2 or 3 months.  The pain is nuisance to problematic level.  Does not wake her up at nighttime.  No changes in vision associated with the pain.  No nausea.  No vomiting.  No jaw claudication.  She had some right lower molar tooth issues.  But not the same pain.  She has a known acoustic neuroma on the right side.  Has not been bothering her.  The dizziness is unchanged.  No ear pain.  No shooting pain from the ear to the face.  She has had no rash.  Otherwise feels fine.  She was seen by her nurse practitioner last week.  A frontal sinus plain x-ray was unremarkable.  She has no major fatigue.  She has no PMR symptoms such as neck shoulder back or thigh discomfort or weakness.  She has had both of her COVID-19 vaccinations.  Her blood pressures overall been well controlled.    Objective   Vital Signs:   /76   Pulse 80   Temp 96.9 °F (36.1 °C) (Temporal)   Ht 159 cm (62.6\")   Wt 58.9 kg (129 lb 14.4 oz)   SpO2 98%   BMI 23.31 kg/m²     Physical Exam  Vitals and nursing note reviewed.   Constitutional:       General: She is not in acute distress.     Appearance: She is well-developed.   HENT:      Head: Atraumatic.      Comments: There are some reproducible pain to palpation over the right mid eyebrow.  No palpable mass.  There is no temporal discomfort to palpation.  No TMJ discomfort to palpation.  TMs are unremarkable except for some wax bilaterally.  Face is symmetric.  Pupils are equal and reactive to light.  Extraocular muscle intact all directions.  Neck supple full range of motion.  Gait is unremarkable.  Mood is unremarkable.  Skin no rash.  Neck:      Thyroid: No thyromegaly.   Cardiovascular:      Rate and " Rhythm: Normal rate and regular rhythm.      Heart sounds: Normal heart sounds.   Pulmonary:      Effort: Pulmonary effort is normal.      Breath sounds: Normal breath sounds.   Skin:     General: Skin is warm and dry.   Psychiatric:         Behavior: Behavior normal.         Thought Content: Thought content normal.         Judgment: Judgment normal.        Result Review :   The following data was reviewed by: Gal Ortega MD on 04/02/2021:  Common labs    Common Labsle 1/12/21 1/12/21    1155 1155   Glucose  106   BUN  19   Creatinine  1.15 (A)   eGFR Non African Am  44 (A)   Sodium  138   Potassium  4.2   Chloride  97 (A)   Calcium  10.5 (A)   Albumin  4.30   Total Bilirubin  1.0   Alkaline Phosphatase  80   AST (SGOT)  20   ALT (SGPT)  12   WBC 8.11    Hemoglobin 14.4    Hematocrit 43.4    Platelets 255    (A) Abnormal value                      Assessment and Plan    Diagnoses and all orders for this visit:    1. Frontal headache (Primary)  -     MRI Brain Without Contrast; Future  -     CBC & Differential  -     C-reactive Protein  -     Sedimentation Rate      Frontal scalp pain.  Suggestive of local nerve irritation at the exit of the frontal bone.  Less likely temporal arteritis/giant cell arteritis.  However I do recommend CRP, sed rate and CBC today.  Patient is very concerned about her history of breast cancer.  I am also ordering an MRI, although hopefully less likely any type of bony metastatic disease.  I will see her back after the test results as needed.  Otherwise I will see her back as scheduled for wellness visit.      Follow Up   No follow-ups on file.  Patient was given instructions and counseling regarding her condition or for health maintenance advice. Please see specific information pulled into the AVS if appropriate.

## 2021-04-03 LAB
BASOPHILS # BLD AUTO: 0.06 10*3/MM3 (ref 0–0.2)
BASOPHILS NFR BLD AUTO: 0.7 % (ref 0–1.5)
CRP SERPL-MCNC: <0.3 MG/DL (ref 0–0.5)
EOSINOPHIL # BLD AUTO: 0.11 10*3/MM3 (ref 0–0.4)
EOSINOPHIL NFR BLD AUTO: 1.2 % (ref 0.3–6.2)
ERYTHROCYTE [DISTWIDTH] IN BLOOD BY AUTOMATED COUNT: 12.3 % (ref 12.3–15.4)
ERYTHROCYTE [SEDIMENTATION RATE] IN BLOOD BY WESTERGREN METHOD: 31 MM/HR (ref 0–30)
HCT VFR BLD AUTO: 42.3 % (ref 34–46.6)
HGB BLD-MCNC: 14.3 G/DL (ref 12–15.9)
IMM GRANULOCYTES # BLD AUTO: 0.02 10*3/MM3 (ref 0–0.05)
IMM GRANULOCYTES NFR BLD AUTO: 0.2 % (ref 0–0.5)
LYMPHOCYTES # BLD AUTO: 2.17 10*3/MM3 (ref 0.7–3.1)
LYMPHOCYTES NFR BLD AUTO: 24.5 % (ref 19.6–45.3)
MCH RBC QN AUTO: 30.1 PG (ref 26.6–33)
MCHC RBC AUTO-ENTMCNC: 33.8 G/DL (ref 31.5–35.7)
MCV RBC AUTO: 89.1 FL (ref 79–97)
MONOCYTES # BLD AUTO: 0.71 10*3/MM3 (ref 0.1–0.9)
MONOCYTES NFR BLD AUTO: 8 % (ref 5–12)
NEUTROPHILS # BLD AUTO: 5.78 10*3/MM3 (ref 1.7–7)
NEUTROPHILS NFR BLD AUTO: 65.4 % (ref 42.7–76)
NRBC BLD AUTO-RTO: 0 /100 WBC (ref 0–0.2)
PLATELET # BLD AUTO: 331 10*3/MM3 (ref 140–450)
RBC # BLD AUTO: 4.75 10*6/MM3 (ref 3.77–5.28)
WBC # BLD AUTO: 8.85 10*3/MM3 (ref 3.4–10.8)

## 2021-04-28 ENCOUNTER — HOSPITAL ENCOUNTER (OUTPATIENT)
Dept: MRI IMAGING | Facility: HOSPITAL | Age: 86
Discharge: HOME | End: 2021-04-28
Admitting: FAMILY MEDICINE

## 2021-04-28 DIAGNOSIS — R51.9 FRONTAL HEADACHE: ICD-10-CM

## 2021-04-28 LAB — CREAT BLDA-MCNC: 1.2 MG/DL (ref 0.6–1.3)

## 2021-04-28 PROCEDURE — 82565 ASSAY OF CREATININE: CPT

## 2021-04-28 PROCEDURE — A9577 INJ MULTIHANCE: HCPCS | Performed by: FAMILY MEDICINE

## 2021-04-28 PROCEDURE — 70553 MRI BRAIN STEM W/O & W/DYE: CPT

## 2021-04-28 PROCEDURE — 0 GADOBENATE DIMEGLUMINE 529 MG/ML SOLUTION: Performed by: FAMILY MEDICINE

## 2021-04-28 RX ADMIN — GADOBENATE DIMEGLUMINE 11 ML: 529 INJECTION, SOLUTION INTRAVENOUS at 13:16

## 2021-05-01 DIAGNOSIS — F13.20 BENZODIAZEPINE DEPENDENCE (HCC): ICD-10-CM

## 2021-05-03 RX ORDER — LORAZEPAM 0.5 MG/1
TABLET ORAL
Qty: 60 TABLET | Refills: 0 | Status: SHIPPED | OUTPATIENT
Start: 2021-05-03 | End: 2021-06-28

## 2021-05-14 RX ORDER — LANSOPRAZOLE 30 MG/1
CAPSULE, DELAYED RELEASE ORAL
Qty: 90 CAPSULE | Refills: 1 | Status: SHIPPED | OUTPATIENT
Start: 2021-05-14 | End: 2021-11-24

## 2021-05-14 RX ORDER — TRIAMTERENE AND HYDROCHLOROTHIAZIDE 37.5; 25 MG/1; MG/1
CAPSULE ORAL
Qty: 90 CAPSULE | Refills: 1 | Status: SHIPPED | OUTPATIENT
Start: 2021-05-14 | End: 2021-11-24

## 2021-06-25 DIAGNOSIS — F13.20 BENZODIAZEPINE DEPENDENCE (HCC): ICD-10-CM

## 2021-06-28 RX ORDER — LORAZEPAM 0.5 MG/1
TABLET ORAL
Qty: 60 TABLET | Refills: 0 | Status: SHIPPED | OUTPATIENT
Start: 2021-06-28 | End: 2021-08-13

## 2021-06-29 DIAGNOSIS — F13.20 BENZODIAZEPINE DEPENDENCE (HCC): ICD-10-CM

## 2021-06-30 RX ORDER — LORAZEPAM 0.5 MG/1
TABLET ORAL
Qty: 60 TABLET | Refills: 0 | OUTPATIENT
Start: 2021-06-30

## 2021-08-13 ENCOUNTER — TELEPHONE (OUTPATIENT)
Dept: FAMILY MEDICINE CLINIC | Facility: CLINIC | Age: 86
End: 2021-08-13

## 2021-08-13 DIAGNOSIS — F13.20 BENZODIAZEPINE DEPENDENCE (HCC): ICD-10-CM

## 2021-08-13 RX ORDER — LORAZEPAM 0.5 MG/1
TABLET ORAL
Qty: 60 TABLET | Refills: 0 | Status: SHIPPED | OUTPATIENT
Start: 2021-08-13 | End: 2021-10-04 | Stop reason: SDUPTHER

## 2021-08-13 NOTE — TELEPHONE ENCOUNTER
PATIENT IS HAVING TO CUT HER LORazepam (ATIVAN) 0.5 MG tablet IN HALF WHICH CAUSES THEM TO CRUMBLE UP AND LOSE SOME OF THE PILLS     IS THERE A WAY TO AVOID THIS OR CALL IN AN EARLY REFILL TO HER PHARMACY       [address] [address] [facility] - [phone] [facility] [phone]   [phone]    PLEASE ADVISE   Monet French (EC) [phone]         NEEDS ASAP

## 2021-08-13 NOTE — TELEPHONE ENCOUNTER
There are no smaller pills we can prescribe.  She will have to continue to do the same.  I sent a renewal in

## 2021-10-04 ENCOUNTER — OFFICE VISIT (OUTPATIENT)
Dept: FAMILY MEDICINE CLINIC | Facility: CLINIC | Age: 86
End: 2021-10-04

## 2021-10-04 VITALS
OXYGEN SATURATION: 97 % | HEART RATE: 82 BPM | TEMPERATURE: 97.5 F | WEIGHT: 126.2 LBS | BODY MASS INDEX: 22.36 KG/M2 | HEIGHT: 63 IN | DIASTOLIC BLOOD PRESSURE: 77 MMHG | SYSTOLIC BLOOD PRESSURE: 130 MMHG

## 2021-10-04 DIAGNOSIS — F13.20 BENZODIAZEPINE DEPENDENCE (HCC): ICD-10-CM

## 2021-10-04 DIAGNOSIS — Z00.00 MEDICARE ANNUAL WELLNESS VISIT, SUBSEQUENT: Primary | ICD-10-CM

## 2021-10-04 DIAGNOSIS — G60.9 IDIOPATHIC PERIPHERAL NEUROPATHY: ICD-10-CM

## 2021-10-04 DIAGNOSIS — C50.811 MALIGNANT NEOPLASM OF OVERLAPPING SITES OF RIGHT BREAST IN FEMALE, ESTROGEN RECEPTOR POSITIVE (HCC): ICD-10-CM

## 2021-10-04 DIAGNOSIS — Z17.0 MALIGNANT NEOPLASM OF OVERLAPPING SITES OF RIGHT BREAST IN FEMALE, ESTROGEN RECEPTOR POSITIVE (HCC): ICD-10-CM

## 2021-10-04 DIAGNOSIS — I10 ESSENTIAL HYPERTENSION: ICD-10-CM

## 2021-10-04 PROCEDURE — G0439 PPPS, SUBSEQ VISIT: HCPCS | Performed by: FAMILY MEDICINE

## 2021-10-04 PROCEDURE — 1170F FXNL STATUS ASSESSED: CPT | Performed by: FAMILY MEDICINE

## 2021-10-04 PROCEDURE — 1160F RVW MEDS BY RX/DR IN RCRD: CPT | Performed by: FAMILY MEDICINE

## 2021-10-04 PROCEDURE — 99214 OFFICE O/P EST MOD 30 MIN: CPT | Performed by: FAMILY MEDICINE

## 2021-10-04 RX ORDER — LORAZEPAM 0.5 MG/1
0.25 TABLET ORAL 2 TIMES DAILY
Qty: 60 TABLET | Refills: 1 | Status: SHIPPED | OUTPATIENT
Start: 2021-10-04 | End: 2022-02-23

## 2021-10-04 NOTE — PROGRESS NOTES
The ABCs of the Annual Wellness Visit  Subsequent Medicare Wellness Visit    Chief Complaint   Patient presents with   • Medicare Wellness-subsequent      Subjective    History of Present Illness:  Charo Thomas is a 89 y.o. female who presents for a Subsequent Medicare Wellness Visit.    The following portions of the patient's history were reviewed and   updated as appropriate: allergies, current medications, past family history, past medical history, past social history, past surgical history and problem list.    Compared to one year ago, the patient feels her physical   health is the same.    Compared to one year ago, the patient feels her mental   health is the same.    Recent Hospitalizations:  She was not admitted to the hospital during the last year.       Current Medical Providers:  Patient Care Team:  Gal Ortega MD as PCP - General (Family Medicine)  Raul Macdonald MD as PCP - Family Medicine  Lalo Edmond MD as Consulting Physician (Hematology and Oncology)  Marvin Tello MD as Referring Physician (Breast Surgery)    Outpatient Medications Prior to Visit   Medication Sig Dispense Refill   • B Complex Vitamins (VITAMIN-B COMPLEX PO) Take  by mouth daily.     • Cholecalciferol (VITAMIN D) 2000 UNITS capsule Take  by mouth.     • lansoprazole (PREVACID) 30 MG capsule Take 1 capsule by mouth once daily 90 capsule 1   • triamterene-hydrochlorothiazide (DYAZIDE) 37.5-25 MG per capsule Take 1 capsule by mouth once daily 90 capsule 1   • LORazepam (ATIVAN) 0.5 MG tablet TAKE 1/2 (ONE-HALF) TABLET BY MOUTH TWICE DAILY 60 tablet 0     No facility-administered medications prior to visit.       No opioid medication identified on active medication list. I have reviewed chart for other potential  high risk medication/s and harmful drug interactions in the elderly.          Aspirin is not on active medication list.  Aspirin use is not indicated based on review of current medical condition/s. Risk of  "harm outweighs potential benefits.  .    Patient Active Problem List   Diagnosis   • Anxiety   • Benzodiazepine dependence (HCC)   • Generalized anxiety disorder   • Gastroesophageal reflux disease   • Essential hypertension   • Peripheral neuropathy   • Acoustic neuroma (HCC)   • Malignant neoplasm of overlapping sites of right breast in female, estrogen receptor positive (HCC)   • Vitamin B12 deficiency   • Radial artery aneurysm, left (HCC)   • Primary osteoarthritis of left knee     Advance Care Planning  Advance Directive is on file.  ACP discussion was held with the patient during this visit. Patient has an advance directive in EMR which is still valid.           Objective    Vitals:    10/04/21 1054   BP: 130/77   Pulse: 82   Temp: 97.5 °F (36.4 °C)   TempSrc: Temporal   SpO2: 97%   Weight: 57.2 kg (126 lb 3.2 oz)   Height: 159 cm (62.6\")     BMI Readings from Last 1 Encounters:   10/04/21 22.64 kg/m²   BMI is within normal parameters. No follow-up required.    Does the patient have evidence of cognitive impairment? No    Physical Exam            HEALTH RISK ASSESSMENT    Smoking Status:  Social History     Tobacco Use   Smoking Status Never Smoker   Smokeless Tobacco Never Used     Alcohol Consumption:  Social History     Substance and Sexual Activity   Alcohol Use No     Fall Risk Screen:    STEADI Fall Risk Assessment was completed, and patient is at LOW risk for falls.Assessment completed on:10/4/2021    Depression Screening:  PHQ-2/PHQ-9 Depression Screening 10/4/2021   Little interest or pleasure in doing things 0   Feeling down, depressed, or hopeless 0   Trouble falling or staying asleep, or sleeping too much -   Feeling tired or having little energy -   Poor appetite or overeating -   Feeling bad about yourself - or that you are a failure or have let yourself or your family down -   Trouble concentrating on things, such as reading the newspaper or watching television -   Moving or speaking so slowly " that other people could have noticed. Or the opposite - being so fidgety or restless that you have been moving around a lot more than usual -   Thoughts that you would be better off dead, or of hurting yourself in some way -   Total Score 0   If you checked off any problems, how difficult have these problems made it for you to do your work, take care of things at home, or get along with other people? -       Health Habits and Functional and Cognitive Screening:  Functional & Cognitive Status 10/4/2021   Do you have difficulty preparing food and eating? Yes   Do you have difficulty bathing yourself, getting dressed or grooming yourself? No   Do you have difficulty using the toilet? No   Do you have difficulty moving around from place to place? No   Do you have trouble with steps or getting out of a bed or a chair? No   Current Diet Unhealthy Diet   Dental Exam Up to date   Eye Exam Up to date   Exercise (times per week) 0 times per week   Current Exercises Include No Regular Exercise   Current Exercise Activities Include -   Do you need help using the phone?  No   Are you deaf or do you have serious difficulty hearing?  No   Do you need help with transportation? Yes   Do you need help shopping? Yes   Do you need help preparing meals?  Yes   Do you need help with housework?  Yes   Do you need help with laundry? No   Do you need help taking your medications? No   Do you need help managing money? No   Do you ever drive or ride in a car without wearing a seat belt? No   Have you felt unusual stress, anger or loneliness in the last month? No   Who do you live with? Alone   If you need help, do you have trouble finding someone available to you? No   Have you been bothered in the last four weeks by sexual problems? No   Do you have difficulty concentrating, remembering or making decisions? No       Age-appropriate Screening Schedule:  Refer to the list below for future screening recommendations based on patient's age, sex  and/or medical conditions. Orders for these recommended tests are listed in the plan section. The patient has been provided with a written plan.    Health Maintenance   Topic Date Due   • TDAP/TD VACCINES (1 - Tdap) Never done   • ZOSTER VACCINE (1 of 2) Never done   • DXA SCAN  08/20/2015   • LIPID PANEL  Never done   • MAMMOGRAM  07/20/2019   • INFLUENZA VACCINE  10/01/2021              Assessment/Plan   CMS Preventative Services Quick Reference  Risk Factors Identified During Encounter  Immunizations Discussed/Encouraged (specific Immunizations; Influenza and COVID19   Chronic Benzo Use and fall risk    The above risks/problems have been discussed with the patient.  Follow up actions/plans if indicated are seen below in the Assessment/Plan Section.  Pertinent information has been shared with the patient in the After Visit Summary.    Diagnoses and all orders for this visit:    1. Medicare annual wellness visit, subsequent (Primary)    2. Essential hypertension    3. Malignant neoplasm of overlapping sites of right breast in female, estrogen receptor positive (HCC)    4. Benzodiazepine dependence (HCC)  -     LORazepam (ATIVAN) 0.5 MG tablet; Take 0.5 tablets by mouth 2 (two) times a day. TAKE 1/2 (ONE-HALF) TABLET BY MOUTH TWICE DAILY  Dispense: 60 tablet; Refill: 1    5. Idiopathic peripheral neuropathy        Follow Up:   No follow-ups on file.     An After Visit Summary and PPPS were made available to the patient.

## 2021-10-04 NOTE — PROGRESS NOTES
"Chief Complaint  Medicare Wellness-subsequent    Subjective          Charo Thomas presents to [facility]  History of Present Illness     Follow-up long-term anxiety and chronic benzodiazepine dependence.  No misuse.  She takes a one half of a 0.5 mg lorazepam twice a day for at least 20 years.  Originally started by a psychiatrist.  We did try to wean her off at one point and she was not able to tolerate being off of it.  Nor was the family.  She has had no recent falls.  She states it does not make her feel dizzy.  She states it helps with her  Neuropathy\".    She states she has neuropathy of the arms and legs hands and feet.  Since her breast cancer surgery number of years ago.  She did not have chemotherapy but did have some radiation therapy.  The symptoms are very stable.  She states she occasionally drops things.  Otherwise not bothering her.    Hypertension.  She continues on triamterene hydrochlorothiazide.  Blood pressures running here is acceptable 130/77.  No cardiovascular symptoms.    Objective   Vital Signs:   /77   Pulse 82   Temp 97.5 °F (36.4 °C) (Temporal)   Ht 159 cm (62.6\")   Wt 57.2 kg (126 lb 3.2 oz)   SpO2 97%   BMI 22.64 kg/m²     Physical Exam  Vitals and nursing note reviewed.   Constitutional:       General: She is not in acute distress.     Appearance: She is well-developed.   Cardiovascular:      Rate and Rhythm: Normal rate and regular rhythm.      Heart sounds: Normal heart sounds.   Pulmonary:      Effort: Pulmonary effort is normal.      Breath sounds: Normal breath sounds.   Skin:     General: Skin is warm and dry.   Psychiatric:         Mood and Affect: Mood normal.         Behavior: Behavior normal.         Thought Content: Thought content normal.         Judgment: Judgment normal.        Result Review :   The following data was reviewed by: Gal Ortega MD on 10/04/2021:  Common labs    Common Labsle 1/12/21 1/12/21 4/2/21 4/28/21 "    1155 1155     Glucose  106     BUN  19     Creatinine  1.15 (A)  1.20   eGFR Non African Am  44 (A)     Sodium  138     Potassium  4.2     Chloride  97 (A)     Calcium  10.5 (A)     Albumin  4.30     Total Bilirubin  1.0     Alkaline Phosphatase  80     AST (SGOT)  20     ALT (SGPT)  12     WBC 8.11  8.85    Hemoglobin 14.4  14.3    Hematocrit 43.4  42.3    Platelets 255  331    (A) Abnormal value       Comments are available for some flowsheets but are not being displayed.                     Assessment and Plan    Diagnoses and all orders for this visit:    1. Medicare annual wellness visit, subsequent (Primary)    2. Essential hypertension    3. Malignant neoplasm of overlapping sites of right breast in female, estrogen receptor positive (HCC)    4. Benzodiazepine dependence (HCC)  -     LORazepam (ATIVAN) 0.5 MG tablet; Take 0.5 tablets by mouth 2 (two) times a day. TAKE 1/2 (ONE-HALF) TABLET BY MOUTH TWICE DAILY  Dispense: 60 tablet; Refill: 1    5. Idiopathic peripheral neuropathy      Hypertension.  Well-controlled.  Continue triamterene hydrochlorothiazide.    Breast cancer.  Still followed by oncology.    Generalized anxiety disorder and benzodiazepine dependence, no evidence of misuse.  At this time she is well tolerant of the medication and attempts to wean have been not successful.  At this point benefits and risk are likely equal.  Continue medication as is.    Neuropathy symptoms.  Cause unknown.  Stable.  We will continue to monitor.    Follow-up in 6 months, will do lab work then if needed..  See me sooner as needed        Follow Up   No follow-ups on file.  Patient was given instructions and counseling regarding her condition or for health maintenance advice. Please see specific information pulled into the AVS if appropriate.

## 2021-10-12 DIAGNOSIS — F13.20 BENZODIAZEPINE DEPENDENCE (HCC): ICD-10-CM

## 2021-10-13 RX ORDER — LORAZEPAM 0.5 MG/1
TABLET ORAL
Qty: 60 TABLET | Refills: 0 | OUTPATIENT
Start: 2021-10-13

## 2021-10-13 NOTE — TELEPHONE ENCOUNTER
Rx Refill Note  Requested Prescriptions     Pending Prescriptions Disp Refills   • LORazepam (ATIVAN) 0.5 MG tablet [Pharmacy Med Name: LORazepam 0.5 MG Oral Tablet] 60 tablet 0     Sig: Take 1/2 (one-half) tablet by mouth twice daily      Last office visit with prescribing clinician: 10/4/2021      Next office visit with prescribing clinician: Visit date not found            Mariah Lyn MA  10/13/21, 09:54 EDT

## 2021-11-04 ENCOUNTER — OFFICE VISIT (OUTPATIENT)
Dept: ORTHOPEDIC SURGERY | Facility: CLINIC | Age: 86
End: 2021-11-04

## 2021-11-04 VITALS — WEIGHT: 123 LBS | BODY MASS INDEX: 21.79 KG/M2 | HEIGHT: 63 IN | TEMPERATURE: 97.3 F

## 2021-11-04 DIAGNOSIS — M17.0 PRIMARY OSTEOARTHRITIS OF KNEES, BILATERAL: ICD-10-CM

## 2021-11-04 DIAGNOSIS — R52 PAIN: Primary | ICD-10-CM

## 2021-11-04 PROCEDURE — 73562 X-RAY EXAM OF KNEE 3: CPT | Performed by: NURSE PRACTITIONER

## 2021-11-04 PROCEDURE — 99213 OFFICE O/P EST LOW 20 MIN: CPT | Performed by: NURSE PRACTITIONER

## 2021-11-04 NOTE — PROGRESS NOTES
willowPatient: Charo Thomas  YOB: 1929 89 y.o. female  Medical Record Number: [id number]    Chief Complaints:   Chief Complaint   Patient presents with   • Left Knee - Establish Care, Pain       History of Present Illness:Charo Thomas is a 89 y.o. female who is a new patient to our practice presents with with complaints of having left knee pain that is chronic in nature.  Patient reports she has been hurting approximately 10 years with a worsening over the last few months.  Patient describes her pain as a severe ache and rates it as an 8 out of 10.  Patient reports she is in some type of constant pain all the time.  She reports her pain is located globally across her knee and posterior aspect of her knee.  Patient reports pain is exacerbated by prolonged standing or walking and lessens with brief periods of rest.  Patient states that she is 89 years old and is not looking for any type of surgery but wants to know what type of conservative measures she can receive to assist with her pain relief.    Allergies:   Allergies   Allergen Reactions   • Cortisone    • Diphenhydramine Hcl (Sleep)    • Epinephrine    • Penicillins    • Sulfa Antibiotics        Medications:   Current Outpatient Medications   Medication Sig Dispense Refill   • B Complex Vitamins (VITAMIN-B COMPLEX PO) Take  by mouth daily.     • Cholecalciferol (VITAMIN D) 2000 UNITS capsule Take  by mouth.     • lansoprazole (PREVACID) 30 MG capsule Take 1 capsule by mouth once daily 90 capsule 1   • LORazepam (ATIVAN) 0.5 MG tablet Take 0.5 tablets by mouth 2 (two) times a day. TAKE 1/2 (ONE-HALF) TABLET BY MOUTH TWICE DAILY 60 tablet 1   • triamterene-hydrochlorothiazide (DYAZIDE) 37.5-25 MG per capsule Take 1 capsule by mouth once daily 90 capsule 1     No current facility-administered medications for this visit.         The following portions of the patient's history were reviewed and updated as appropriate: allergies, current medications,  "past family history, past medical history, past social history, past surgical history and problem list.    Review of Systems:   A 14 point review of systems was performed. All systems negative except pertinent positives/negative listed in HPI above    Physical Exam:   Vitals:    11/04/21 1445   Temp: 97.3 °F (36.3 °C)   Weight: 55.8 kg (123 lb)   Height: 158.8 cm (62.5\")       General: A and O x 3, ASA, NAD    SCLERA:    Normal    DENTITION:   Normal    Knee:  left    ALIGNMENT: Slightly varus  ,   Patella  tracks  midline    GAIT:    Antalgic    SKIN:    No abnormality    RANGE OF MOTION:  0  -  135   DEG    STRENGTH:   4  / 5    LIGAMENTS:    No varus / valgus instability.   Negative  Lachman.    MENISCUS:     Negative   Kristi       DISTAL PULSES:    Paplable    DISTAL SENSATION :   Intact    LYMPHATICS:     No   lymphadenopathy    OTHER:          - Positive   effusion      + Crepitance with ROM            Radiology:  Xrays 3views (ap,lateral, sunrise) were ordered and reviewed for evaluation of knee pain demonstrating advanced varus osteoarthritis with bone on bone articulation, subchondral cysts, and periarticular osteophytes to the left knee, right knee has mild arthritic changes noted.  Patient does have patellofemoral osteoarthritis noted bilateral knees.  No previous x-rays available for comparison.    Assessment/Plan: Primary osteoarthritis bilateral knees, left greater than right    Treatment options was imaging results were discussed in detail with the patient.  We are going to proceed forward with conservative measures at this time.  Were going to refer the patient for physical therapy, home health.  Patient is 89 years old and does not have a 's license anymore and has difficulty with transportation issues.  Therefore will have home health come and evaluate the patient for a home exercise program.  I have also recommended a topical NSAID gel for the patient to try to assist with pain relief.  " Patient was not interested in any type of intra-articular joint injection today and wants to save that for when her knee is severely in pain.  We will have the patient follow back up with me in 3 months for reevaluation.      Mckay Rajan, APRN  11/4/2021

## 2021-11-05 RX ORDER — DICLOFENAC SODIUM 20 MG/G
1 SOLUTION TOPICAL 2 TIMES DAILY
Qty: 112 G | Refills: 1 | Status: SHIPPED | OUTPATIENT
Start: 2021-11-05 | End: 2022-04-04 | Stop reason: HOSPADM

## 2021-11-07 ENCOUNTER — HOME HEALTH ADMISSION (OUTPATIENT)
Dept: HOME HEALTH SERVICES | Facility: HOME HEALTHCARE | Age: 86
End: 2021-11-07

## 2021-11-09 ENCOUNTER — TELEPHONE (OUTPATIENT)
Dept: ORTHOPEDIC SURGERY | Facility: CLINIC | Age: 86
End: 2021-11-09

## 2021-11-09 DIAGNOSIS — M17.0 PRIMARY OSTEOARTHRITIS OF KNEES, BILATERAL: Primary | ICD-10-CM

## 2021-11-09 RX ORDER — DICLOFENAC SODIUM 20 MG/G
2 SOLUTION TOPICAL 2 TIMES DAILY
Qty: 112 G | Refills: 12 | Status: SHIPPED | OUTPATIENT
Start: 2021-11-09 | End: 2022-04-04 | Stop reason: HOSPADM

## 2021-11-09 NOTE — TELEPHONE ENCOUNTER
I have explained to Ms. French thru MyChart messages that patients insurance does not cover pennsaid and that it will be very expensive to get at the local pharmacy. She had let patient know all of that and patient insisted on script being sent to [facility].

## 2021-11-09 NOTE — TELEPHONE ENCOUNTER
I already sent a prescription to [facility] as this is usually the cheapest way for the patient to get this prescription filled.  If [facility] is not going to cover her prescription I guarantee you her local pharmacy will be significantly higher.  Patient would benefit from Voltaren just as well as Pennsaid.  If she has any other questions have her feel free to give me a call back.

## 2021-11-09 NOTE — TELEPHONE ENCOUNTER
----- Message from Charo Thomas sent at 11/9/2021  1:56 PM EST -----  Regarding: Pennsaid for Charo Thomas  That’s what I told her. Thanks for helping me.  Monet

## 2021-11-24 RX ORDER — TRIAMTERENE AND HYDROCHLOROTHIAZIDE 37.5; 25 MG/1; MG/1
CAPSULE ORAL
Qty: 90 CAPSULE | Refills: 0 | Status: SHIPPED | OUTPATIENT
Start: 2021-11-24 | End: 2022-02-23

## 2021-11-24 RX ORDER — LANSOPRAZOLE 30 MG/1
CAPSULE, DELAYED RELEASE ORAL
Qty: 90 CAPSULE | Refills: 0 | Status: SHIPPED | OUTPATIENT
Start: 2021-11-24 | End: 2022-02-23

## 2021-11-24 NOTE — TELEPHONE ENCOUNTER
Rx Refill Note  Requested Prescriptions     Pending Prescriptions Disp Refills   • lansoprazole (PREVACID) 30 MG capsule [Pharmacy Med Name: Lansoprazole 30 MG Oral Capsule Delayed Release] 90 capsule 0     Sig: Take 1 capsule by mouth once daily   • triamterene-hydrochlorothiazide (DYAZIDE) 37.5-25 MG per capsule [Pharmacy Med Name: Triamterene-HCTZ 37.5-25 MG Oral Capsule] 90 capsule 0     Sig: Take 1 capsule by mouth once daily      Last office visit with prescribing clinician: 10/4/2021      Next office visit with prescribing clinician: Visit date not found            Fernanda Santana MA  11/24/21, 11:27 EST

## 2022-01-19 ENCOUNTER — APPOINTMENT (OUTPATIENT)
Dept: LAB | Facility: HOSPITAL | Age: 87
End: 2022-01-19

## 2022-02-10 ENCOUNTER — OFFICE VISIT (OUTPATIENT)
Dept: ONCOLOGY | Facility: CLINIC | Age: 87
End: 2022-02-10

## 2022-02-10 ENCOUNTER — LAB (OUTPATIENT)
Dept: LAB | Facility: HOSPITAL | Age: 87
End: 2022-02-10

## 2022-02-10 ENCOUNTER — OFFICE VISIT (OUTPATIENT)
Dept: ORTHOPEDIC SURGERY | Facility: CLINIC | Age: 87
End: 2022-02-10

## 2022-02-10 VITALS — BODY MASS INDEX: 21.79 KG/M2 | HEIGHT: 63 IN | TEMPERATURE: 98.2 F | WEIGHT: 123 LBS

## 2022-02-10 VITALS
OXYGEN SATURATION: 98 % | WEIGHT: 127 LBS | TEMPERATURE: 96.9 F | RESPIRATION RATE: 16 BRPM | HEIGHT: 62 IN | BODY MASS INDEX: 23.37 KG/M2 | HEART RATE: 100 BPM | DIASTOLIC BLOOD PRESSURE: 74 MMHG | SYSTOLIC BLOOD PRESSURE: 136 MMHG

## 2022-02-10 DIAGNOSIS — R52 PAIN: Primary | ICD-10-CM

## 2022-02-10 DIAGNOSIS — Z17.0 MALIGNANT NEOPLASM OF OVERLAPPING SITES OF RIGHT BREAST IN FEMALE, ESTROGEN RECEPTOR POSITIVE: Primary | ICD-10-CM

## 2022-02-10 DIAGNOSIS — C50.811 MALIGNANT NEOPLASM OF OVERLAPPING SITES OF RIGHT BREAST IN FEMALE, ESTROGEN RECEPTOR POSITIVE: Primary | ICD-10-CM

## 2022-02-10 DIAGNOSIS — M17.0 PRIMARY OSTEOARTHRITIS OF KNEES, BILATERAL: ICD-10-CM

## 2022-02-10 DIAGNOSIS — M16.12 PRIMARY OSTEOARTHRITIS OF LEFT HIP: ICD-10-CM

## 2022-02-10 LAB
ALBUMIN SERPL-MCNC: 4.2 G/DL (ref 3.5–5.2)
ALBUMIN/GLOB SERPL: 1.4 G/DL (ref 1.1–2.4)
ALP SERPL-CCNC: 74 U/L (ref 38–116)
ALT SERPL W P-5'-P-CCNC: 15 U/L (ref 0–33)
ANION GAP SERPL CALCULATED.3IONS-SCNC: 13 MMOL/L (ref 5–15)
AST SERPL-CCNC: 19 U/L (ref 0–32)
BASOPHILS # BLD AUTO: 0.06 10*3/MM3 (ref 0–0.2)
BASOPHILS NFR BLD AUTO: 0.8 % (ref 0–1.5)
BILIRUB SERPL-MCNC: 0.9 MG/DL (ref 0.2–1.2)
BUN SERPL-MCNC: 23 MG/DL (ref 6–20)
BUN/CREAT SERPL: 20.5 (ref 7.3–30)
CALCIUM SPEC-SCNC: 9.9 MG/DL (ref 8.5–10.2)
CHLORIDE SERPL-SCNC: 97 MMOL/L (ref 98–107)
CO2 SERPL-SCNC: 29 MMOL/L (ref 22–29)
CREAT SERPL-MCNC: 1.12 MG/DL (ref 0.6–1.1)
DEPRECATED RDW RBC AUTO: 43.8 FL (ref 37–54)
EOSINOPHIL # BLD AUTO: 0.06 10*3/MM3 (ref 0–0.4)
EOSINOPHIL NFR BLD AUTO: 0.8 % (ref 0.3–6.2)
ERYTHROCYTE [DISTWIDTH] IN BLOOD BY AUTOMATED COUNT: 12.8 % (ref 12.3–15.4)
GFR SERPL CREATININE-BSD FRML MDRD: 45 ML/MIN/1.73
GLOBULIN UR ELPH-MCNC: 3 GM/DL (ref 1.8–3.5)
GLUCOSE SERPL-MCNC: 104 MG/DL (ref 74–124)
HCT VFR BLD AUTO: 41.6 % (ref 34–46.6)
HGB BLD-MCNC: 13.7 G/DL (ref 12–15.9)
IMM GRANULOCYTES # BLD AUTO: 0.01 10*3/MM3 (ref 0–0.05)
IMM GRANULOCYTES NFR BLD AUTO: 0.1 % (ref 0–0.5)
LYMPHOCYTES # BLD AUTO: 2.07 10*3/MM3 (ref 0.7–3.1)
LYMPHOCYTES NFR BLD AUTO: 26.6 % (ref 19.6–45.3)
MCH RBC QN AUTO: 30.6 PG (ref 26.6–33)
MCHC RBC AUTO-ENTMCNC: 32.9 G/DL (ref 31.5–35.7)
MCV RBC AUTO: 93.1 FL (ref 79–97)
MONOCYTES # BLD AUTO: 0.63 10*3/MM3 (ref 0.1–0.9)
MONOCYTES NFR BLD AUTO: 8.1 % (ref 5–12)
NEUTROPHILS NFR BLD AUTO: 4.94 10*3/MM3 (ref 1.7–7)
NEUTROPHILS NFR BLD AUTO: 63.6 % (ref 42.7–76)
NRBC BLD AUTO-RTO: 0 /100 WBC (ref 0–0.2)
PLATELET # BLD AUTO: 277 10*3/MM3 (ref 140–450)
PMV BLD AUTO: 9.1 FL (ref 6–12)
POTASSIUM SERPL-SCNC: 4 MMOL/L (ref 3.5–4.7)
PROT SERPL-MCNC: 7.2 G/DL (ref 6.3–8)
RBC # BLD AUTO: 4.47 10*6/MM3 (ref 3.77–5.28)
SODIUM SERPL-SCNC: 139 MMOL/L (ref 134–145)
WBC NRBC COR # BLD: 7.77 10*3/MM3 (ref 3.4–10.8)

## 2022-02-10 PROCEDURE — 80053 COMPREHEN METABOLIC PANEL: CPT

## 2022-02-10 PROCEDURE — 99213 OFFICE O/P EST LOW 20 MIN: CPT | Performed by: NURSE PRACTITIONER

## 2022-02-10 PROCEDURE — 73501 X-RAY EXAM HIP UNI 1 VIEW: CPT | Performed by: NURSE PRACTITIONER

## 2022-02-10 PROCEDURE — 36415 COLL VENOUS BLD VENIPUNCTURE: CPT

## 2022-02-10 PROCEDURE — 99214 OFFICE O/P EST MOD 30 MIN: CPT | Performed by: INTERNAL MEDICINE

## 2022-02-10 PROCEDURE — 85025 COMPLETE CBC W/AUTO DIFF WBC: CPT

## 2022-02-10 NOTE — PROGRESS NOTES
Patient: Charo Thomas  YOB: 1929 89 y.o. female  Medical Record Number: [id number]    Chief Complaints:   Chief Complaint   Patient presents with   • Left Knee - Follow-up       History of Present Illness:Charo Thomas is a 89 y.o. female who presents for follow-up of left knee pain that is chronic in nature.  Since the last visit patient reports that her left hip and thigh has been bothering her.  During the physical therapy visits the physical therapy stated that he thinks her left hip could be osteoarthritic as well.  Patient does not report any worsening of her symptoms and states that she is about the same.  Patient is ambulatory with a walker today.  She is without any other significant complaints today.    Allergies:   Allergies   Allergen Reactions   • Cortisone    • Diphenhydramine Hcl (Sleep)    • Epinephrine    • Penicillins    • Sulfa Antibiotics        Medications:   Current Outpatient Medications   Medication Sig Dispense Refill   • B Complex Vitamins (VITAMIN-B COMPLEX PO) Take  by mouth daily.     • Cholecalciferol (VITAMIN D) 2000 UNITS capsule Take  by mouth.     • Diclofenac Sodium (Pennsaid) 2 % solution Apply 1 Pump topically 2 (Two) Times a Day. 112 g 1   • Diclofenac Sodium 2 % solution Apply 2 Pump topically 2 (Two) Times a Day. 112 g 12   • lansoprazole (PREVACID) 30 MG capsule Take 1 capsule by mouth once daily 90 capsule 0   • LORazepam (ATIVAN) 0.5 MG tablet Take 0.5 tablets by mouth 2 (two) times a day. TAKE 1/2 (ONE-HALF) TABLET BY MOUTH TWICE DAILY 60 tablet 1   • triamterene-hydrochlorothiazide (DYAZIDE) 37.5-25 MG per capsule Take 1 capsule by mouth once daily 90 capsule 0     No current facility-administered medications for this visit.         The following portions of the patient's history were reviewed and updated as appropriate: allergies, current medications, past family history, past medical history, past social history, past surgical history and problem  "list.    Review of Systems:   A 14 point review of systems was performed. All systems negative except pertinent positives/negative listed in HPI above    Physical Exam:   Vitals:    02/10/22 1342   Temp: 98.2 °F (36.8 °C)   Weight: 55.8 kg (123 lb)   Height: 158.8 cm (62.52\")       General: A and O x 3, ASA, NAD    SCLERA:    Normal    DENTITION:   Normal    Knee:  left    ALIGNMENT: Slightly   varus  ,   Patella  tracks  midline    GAIT:    Antalgic    SKIN:    No abnormality    RANGE OF MOTION:   0  -  135   DEG    STRENGTH:   4  / 5    LIGAMENTS:    No varus / valgus instability.   Negative  Lachman.    MENISCUS:     Negative   Kristi       DISTAL PULSES:    Paplable    DISTAL SENSATION :   Intact    LYMPHATICS:     No   lymphadenopathy    OTHER:          - Positive   effusion      + Crepitance with ROM     Hip:  left    LEG ALIGNMENT:     Neutral        LEG LENGTH DISCREPANCY   :    none    GAIT:     Antalgic    SKIN:     No abnormality    RANGE OF MOTION:     Limited by joint irritability    STRENGTH:     Limited by joint irratibility    DISTAL PULSES:    Paplable    DISTAL SENSATION :   Intact    LYMPHATICS:     No   lymphadenopathy    OTHER:          +   Stinchfeld test      -    log roll      -   Tenderness to palpation trochanteric bursa       Radiology:  Xrays 3views left knee (ap,lateral, sunrise) taken previously were reviewed for evaluation of knee pain demonstrating mild osteoarthritic changes to the left knee.  No previous x-rays were available for comparison purposes.    X-rays 2 views left hip (AP and lateral) were ordered and reviewed for evaluation of left hip pain.  X-rays demonstrate severe osteoarthritis with bone-on-bone contact, subchondral sclerosis, periarticular osteophytes present.  No previous hip x-rays available for comparison purposes.    Assessment/Plan: Primary osteoarthritis left knee/left hip     Treatment options as well as imaging results were discussed in detail with the " patient.  Patient does have advanced osteoarthritis to her left hip and is probably the primary source of her issues.  We did discuss conservative measures of her living with her condition, doing an intra-articular joint injection, and surgery options.  Patient states based off of her age is a 89 years old she is not interested in any type of surgery at this time.  Patient also wants to hold off on doing an intra-articular joint injection and wants to go home and think about it.  Patient states that she will give me a call and let me know if she wants to do any other treatment options in regards to her condition.    Mckya Rajan, APRN  02/10/2022

## 2022-02-10 NOTE — PROGRESS NOTES
REASONS FOR FOLLOWUP:      1.    History of stage I breast cancer on the right, invasive status post lumpectomy and radiation therapy.  The patient could not accomplish adjuv  ant hormonal therapy.     2. History of vi  tamin B12 deficiency that has been corrected with sublingual vitamin B12 that will remain ongoing at a dose of 1000 mcg every day.    3. Psychiatric illness.  The patient at this time remains and looks compensated.        HISTORY OF PRESENT ILLNESS:       DURING THE VISIT WITH THE PATIENT TODAY , PATIENT HAD FACE MASK, MY MEDICAL ASSISTANT AND I  HAD PROPPER PROTECTIVE EQUIPMENT, AND I DID HAND HYGIENE WITH SOAP AND WATER BEFORE AND AFTER THE VISIT.      This patient returns today to the office for followup. She is in company of her caretaker who is now living with her. 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 The patient’s other options would include doing nothing or have a local steroid injection. She has been very hesitant to decide what to do. She feels actually very well otherwise. Her mind is sharp. She is eating well. Her weight is stable. Her bowel function is appropriate. Urination is appropriate. No incontinence. No cardiovascular or respiratory issues. No other sites of bone pain and mentally she remains sharp and able to make all her decisions and carry any kind of discussion and activity with no limitations. In other words she is an exceptional 89-year-old lady who has all her faculties and most of her body functions are still in very good shape.        Past Medical History:   Diagnosis Date   • Benign esophageal stricture    • Breast cancer (HCC) 06/30/2010    Right breast w/papillary features, primary tumor size 1.9 cm w/negative margins   • Breast cancer in male, right 2013    Right  breast, stage I, invasive   • Diverticular disease of colon    • H. pylori infection    • H/O Migraines    • Hyperlipidemia    • Hyperplastic polyps of stomach    • Hypertension    • Knee swelling 1995   • Osteoporosis    • Psychiatric disorder    • Vitamin B12 deficiency      Past Surgical History:   Procedure Laterality Date   • APPENDECTOMY  1944   • BREAST BIOPSY Right 2010   • BREAST LUMPECTOMY     • BREAST LUMPECTOMY WITH SENTINEL NODE BIOPSY Right 2013   • COLONOSCOPY      Diverticular disease   • DILATATION AND CURETTAGE  1957, 1971    x2     Family History   Problem Relation Age of Onset   • Dementia Mother    • Cancer Mother 81        Breast removed   • Cancer Father 69        Throat; smoker   • Cancer Maternal Aunt         2 aunts at age 49 and 70; deaths unrelated to cancer   • Other Son         Brain tumor   • Hypertension Son      Social History     Socioeconomic History   • Marital status:      Spouse name: Marvin   • Number of children: 1   • Years of education: High School   Tobacco Use   • Smoking status: Never Smoker   • Smokeless tobacco: Never Used   Vaping Use   • Vaping Use: Never used   Substance and Sexual Activity   • Alcohol use: No   • Drug use: No   • Sexual activity: Never     Allergies   Allergen Reactions   • Cortisone    • Diphenhydramine Hcl (Sleep)    • Epinephrine    • Penicillins    • Sulfa Antibiotics      Current Outpatient Medications on File Prior to Visit   Medication Sig Dispense Refill   • B Complex Vitamins (VITAMIN-B COMPLEX PO) Take  by mouth daily.     • Cholecalciferol (VITAMIN D) 2000 UNITS capsule Take  by mouth.     • Diclofenac Sodium (Pennsaid) 2 % solution Apply 1 Pump topically 2 (Two) Times a Day. 112 g 1   • Diclofenac Sodium 2 % solution Apply 2 Pump topically 2 (Two) Times a Day. 112 g 12   • lansoprazole (PREVACID) 30 MG capsule Take 1 capsule by mouth once daily 90 capsule 0   • LORazepam (ATIVAN) 0.5 MG tablet Take 0.5 tablets by mouth 2 (two)  "times a day. TAKE 1/2 (ONE-HALF) TABLET BY MOUTH TWICE DAILY 60 tablet 1   • triamterene-hydrochlorothiazide (DYAZIDE) 37.5-25 MG per capsule Take 1 capsule by mouth once daily 90 capsule 0     No current facility-administered medications on file prior to visit.                            VITAL SIGNS:   Vitals:    02/10/22 1603   BP: 136/74   Pulse: 100   Resp: 16   Temp: 96.9 °F (36.1 °C)   TempSrc: Temporal   SpO2: 98%   Weight: 57.6 kg (127 lb)   Height: 158 cm (62.21\")     PAIN: 0 out of 10     ECO         PHYSICAL EXAMINATION:      I HAVE PERSONALLY REVIEWED THE HISTORY OF THE PRESENT ILLNESS, PAST MEDICAL HISTORY, FAMILY HISTORY, SOCIAL HISTORY, ALLERGIES, MEDICATIONS STATED ABOVE IN THE  NOTE FROM TODAY.        GENERAL:  Well-developed, well-nourished  Patient  in no acute distress.   SKIN:  Warm, dry ,NO rashes,NO purpura ,NO petechiae.  HEENT:  Pupils were equal and reactive to light and accomodation, conjunctivae noninjected, no pterygium, normal extraocular movements, normal visual acuity.   NECK:  Supple with good range of motion; no thyromegaly , no other masses, no JVD or bruits, no cervical adenopathies.No carotid artery pain, no carotid abnormal pulsation , NO arterial dance.  LYMPHATICS:  No cervical, NO supraclavicular, NO axillary,NO epitrochlear , NO inguinal adenopathy.  CARDIAC   normal rate and regular rhythm, without murmur,NO rubs NO S3 NO S4 right or left .  LUNGS: normal breath sounds bilateral, no wheezing, rhonchi, crackles or rubs.  INSPECTION of  breast documented symmetry of the tissue per se and location and size of the nipple,no retractions or inversion of the nipple, normal skin without lesions, no erythema or nodules,no peau d'orange, no prominence of superficial veins or chest wall collateral circulation.PALPATION of the breast documented normal skin turgor, no induration, alteration in local temperature, or pain, no palpable masses or nodules, normal mobility of the " tissues,no fixation of the tissue or parenchyma to the chest wall, no alteration at the tail of the breasts or axillas, no adenopathies. Surgical site was well healed.No lymphedema in either extremity.  VASCULAR VENOUS: no cyanosis, collateral circulation, varicosities, edema, palpable cords, pain, erythema.  ABDOMEN:  Soft, nontender with no hepatomegaly, no splenomegaly,no masses, no ascites, no collateral circulation,no distention,no Neel sign.  EXTREMITIES  AND SPINE:  No clubbing, cyanosis or edema, no deformities , LEFT HIP pain .No kyphosis, scoliosis, no other deformities, no pain in spine, no pain in ribs , no pain inpelvic bone.  NEUROLOGICAL:  Patient was awake, alert, oriented to time, person and place.SHARP, NORMAL MEMORY .             LABORATORY DATA   0 Result Notes    Component   Ref Range & Units 15:55   (2/10/22) 10 mo ago   (4/2/21) 1 yr ago   (1/12/21) 2 yr ago   (4/16/19) 4 yr ago   (1/2/18) 4 yr ago   (6/13/17) 5 yr ago   (2/8/17)   WBC   3.40 - 10.80 10*3/mm3 7.77  8.85  8.11  9.10  7.36 R  6.40 R  9.91 R    RBC   3.77 - 5.28 10*6/mm3 4.47  4.75  4.76  4.72  5.03 R  4.89 R  4.79 R    Hemoglobin   12.0 - 15.9 g/dL 13.7  14.3  14.4  14.3  15.0 R  14.5 R  14.3 R    Hematocrit   34.0 - 46.6 % 41.6  42.3  43.4  41.6  43.6 R  43.7 R  44.1 R    MCV   79.0 - 97.0 fL 93.1  89.1  91.2  88.1  86.7 R  89.4 R  92.1 R    MCH   26.6 - 33.0 pg 30.6  30.1  30.3  30.3  29.8 R  29.7 R  29.9 R    MCHC   31.5 - 35.7 g/dL 32.9  33.8  33.2  34.4  34.4 R  33.2 R  32.4 R    RDW   12.3 - 15.4 % 12.8  12.3  12.5  12.7  12.8 R  13.0 R  13.0 R    RDW-SD   37.0 - 54.0 fl 43.8   42.1  41.2  40.5  42.5     MPV   6.0 - 12.0 fL 9.1   9.4  10.1  10.1  9.8     Platelets   140 - 450 10*3/mm3 277  331  255  230  191 R  241 R  283 R    Neutrophil %   42.7 - 76.0 % 63.6  65.4  73.0  56.0  65.6  62.0  74.2    Lymphocyte %   19.6 - 45.3 % 26.6  24.5  19.5 Low   27.7  22.6  27.7  16.0 Low     Monocyte %   5.0 - 12.0 % 8.1  8.0  6.2   7.7  8.8  7.2  8.6    Eosinophil %   0.3 - 6.2 % 0.8  1.2  0.6  6.8 High   1.9  2.0  1.0    Basophil %   0.0 - 1.5 % 0.8  0.7  0.5  1.0  0.7  0.8  0.2    Immature Grans %   0.0 - 0.5 % 0.1  0.2  0.2  0.8 High   0.4  0.3  0.0    Neutrophils, Absolute   1.70 - 7.00 10*3/mm3 4.94  5.78  5.92  5.10 R  4.83 R  3.97 R  7.35 R    Lymphocytes, Absolute   0.70 - 3.10 10*3/mm3 2.07  2.17  1.58  2.52  1.66 R  1.77 R  1.59 R    Monocytes, Absolute   0.10 - 0.90 10*3/mm3 0.63  0.71  0            Component   Ref Range & Units 15:55   (2/10/22) 9 mo ago   (4/28/21) 1 yr ago   (1/12/21) 4 yr ago   (6/13/17) 4 yr ago   (4/28/17) 5 yr ago   (2/8/17) 5 yr ago   (11/29/16)   Glucose   74 - 124 mg/dL 104   106  87 R  109 High  R  95 R  99 R    BUN   6 - 20 mg/dL 23 High    19  22 R  22 R  27 High  R  21 R    Creatinine   0.60 - 1.10 mg/dL 1.12 High   1.20 R, CM  1.15 High   1.11 High  R  0.99 R  1.20 High  R  1.02 High  R    Sodium   134 - 145 mmol/L 139   138  146 High  R  143 R  140 R  142 R    Potassium   3.5 - 4.7 mmol/L 4.0   4.2  3.3 Low  R  3.8 R  3.6 R  3.8 R    Chloride   98 - 107 mmol/L 97 Low    97 Low   99  97 Low   96 Low   100    CO2   22.0 - 29.0 mmol/L 29.0   26.8  31.6 High     28.0    Calcium   8.5 - 10.2 mg/dL 9.9   10.5 High   10.1 R  10.3 R  10.2 R  9.8 R    Total Protein   6.3 - 8.0 g/dL 7.2   7.4  7.5 R   7.1 R  7.2 R    Albumin   3.50 - 5.20 g/dL 4.20   4.30  4.40   4.60  4.10    ALT (SGPT)   0 - 33 U/L 15   12  13 R   16 R  13 R    AST (SGOT)   0 - 32 U/L 19   20  20 R   20 R  19 R    Alkaline Phosphatase   38 - 116 U/L 74   80  73 R   71 R  77 R    Total Bilirubin   0.2 - 1.2 mg/dL 0.9   1.0  1.2 R   1.2 R  1.1 R    eGFR Non African Amer   >60 mL/min/1.73 45 Low    44 Low   46 Low   53 Low  CM  42 Low  CM  51 Low     Globulin   1.8 - 3.5 gm/dL 3.0   3.1  3.1 R    3.1 R    A/G Ratio   1.1 - 2.4 g/dL 1.4   1.4  1.4 R   1.8 R  1.3 R    BUN/Creatinine Ratio   7.3 - 30.0 20.5   16.5  19.8 R  22.2 R  22.5 R  20.6 R     Anion Gap   5.0 - 15.0 mmol/L 13.0   14.2  15.4 R    14.0 R    Resulting Agency  [facility]  [facility]                            ASSESSMENT/PLAN:     1  This patient has remote history of  Right upper outer quadrant breast cancer. I find nothing to suggest recurrent disease. Mammogram 2018 is normal and breasts examination today  not showing any new abnormalities.   I find no reason for this patient of 89 to continue getting mammograms at this point. Her breast examination again today is completely normal.         2.The patient has history of vitamin B12 deficiency.  This has been correc  venkata with IM vitamin B12 and she remains on a program of sublingual vitamin B12 supplementation 1000 mcg a day that she will remain ongoing for the time being.            3.The patient has developed very significant pain in the left hip in absence of any trauma, evades to degenerative disease. She has been seen by Orthopedic Medicine, offered the possibility of doing nothing, therapy, local injections, replacement therapy. She is very hesitant to decide what to do.     In my opinion this patient is an exceptional 89-year-old person. Her heart, lungs, kidneys and liver function remain normal. Laboratory testing posted above remains normal. She remains completely mentally competent and I think it is worth the analysis of a hip replacement therapy that will give her back mobility, independence and ability to function and continue living. If she has regional anesthesia and proper management during postoperative time and prompt physical therapy and proper prophylaxis for deep vein thrombosis, I think this patient will be pulling through and doing better in the long run. Obviously operating on somebody who is 89 is not an easy challenge and an easy risk and she recognizes that as well.     From this point of view of her breast cancer, I find nothing to indicate recurrent disease.     I do believe that the patient will call us back next  week to let us know if she wants to have for me a conversation with her orthopedic surgeon and then go from there.     Otherwise, tentative appointment to see her back in a year.     I discussed all these facts with the patient in the room and her caretaker in the room.                         no nausea/no vomiting/no diarrhea/no constipation

## 2022-02-23 DIAGNOSIS — F13.20 BENZODIAZEPINE DEPENDENCE: ICD-10-CM

## 2022-02-23 RX ORDER — LORAZEPAM 0.5 MG/1
TABLET ORAL
Qty: 60 TABLET | Refills: 1 | Status: SHIPPED | OUTPATIENT
Start: 2022-02-23 | End: 2022-06-24

## 2022-02-23 RX ORDER — TRIAMTERENE AND HYDROCHLOROTHIAZIDE 37.5; 25 MG/1; MG/1
CAPSULE ORAL
Qty: 90 CAPSULE | Refills: 1 | Status: SHIPPED | OUTPATIENT
Start: 2022-02-23 | End: 2022-08-09

## 2022-02-23 RX ORDER — LANSOPRAZOLE 30 MG/1
CAPSULE, DELAYED RELEASE ORAL
Qty: 90 CAPSULE | Refills: 1 | Status: SHIPPED | OUTPATIENT
Start: 2022-02-23 | End: 2022-08-09

## 2022-02-23 NOTE — TELEPHONE ENCOUNTER
Rx Refill Note  Requested Prescriptions     Pending Prescriptions Disp Refills   • lansoprazole (PREVACID) 30 MG capsule [Pharmacy Med Name: Lansoprazole 30 MG Oral Capsule Delayed Release] 90 capsule 0     Sig: Take 1 capsule by mouth once daily   • triamterene-hydrochlorothiazide (DYAZIDE) 37.5-25 MG per capsule [Pharmacy Med Name: Triamterene-HCTZ 37.5-25 MG Oral Capsule] 90 capsule 0     Sig: Take 1 capsule by mouth once daily   • LORazepam (ATIVAN) 0.5 MG tablet [Pharmacy Med Name: LORazepam 0.5 MG Oral Tablet] 60 tablet 0     Sig: Take 1/2 (one-half) tablet by mouth twice daily      Last office visit with prescribing clinician: 10/4/2021      Next office visit with prescribing clinician: Visit date not found            Mariah Lyn MA  02/23/22, 13:51 EST

## 2022-04-04 ENCOUNTER — OFFICE VISIT (OUTPATIENT)
Dept: FAMILY MEDICINE CLINIC | Facility: CLINIC | Age: 87
End: 2022-04-04

## 2022-04-04 VITALS
SYSTOLIC BLOOD PRESSURE: 137 MMHG | HEART RATE: 104 BPM | OXYGEN SATURATION: 94 % | WEIGHT: 126.4 LBS | TEMPERATURE: 96.9 F | DIASTOLIC BLOOD PRESSURE: 74 MMHG | HEIGHT: 62 IN | BODY MASS INDEX: 23.26 KG/M2

## 2022-04-04 DIAGNOSIS — C50.811 MALIGNANT NEOPLASM OF OVERLAPPING SITES OF RIGHT BREAST IN FEMALE, ESTROGEN RECEPTOR POSITIVE: Chronic | ICD-10-CM

## 2022-04-04 DIAGNOSIS — I10 ESSENTIAL HYPERTENSION: Chronic | ICD-10-CM

## 2022-04-04 DIAGNOSIS — Z17.0 MALIGNANT NEOPLASM OF OVERLAPPING SITES OF RIGHT BREAST IN FEMALE, ESTROGEN RECEPTOR POSITIVE: Chronic | ICD-10-CM

## 2022-04-04 DIAGNOSIS — F41.9 ANXIETY: Primary | ICD-10-CM

## 2022-04-04 DIAGNOSIS — F13.20 BENZODIAZEPINE DEPENDENCE: ICD-10-CM

## 2022-04-04 PROCEDURE — 99214 OFFICE O/P EST MOD 30 MIN: CPT | Performed by: FAMILY MEDICINE

## 2022-04-04 NOTE — PROGRESS NOTES
"Chief Complaint  Hypertension and Anxiety    Subjective          Charo Thomas presents to [facility]  History of Present Illness     Hypertension follow-up.  Continues triamterene hydrochlorothiazide without incident.  No urinary trouble.  Blood pressure nearly normal today.  No cardiovascular symptoms.    Generalized anxiety disorder.  Longstanding benzodiazepine dependence and tolerance.  She takes 0.25 mg of lorazepam twice a day for the last at least 20 years.  We have attempted to wean off before and her anxiety became considerable.  Her caregiver was concerned and had her restart.  She has had no falls or near falls.    Breast cancer.  Remote.  She continues to follow with her oncologist.  Her recent CMP and CBC overall within normal limits.    Objective   Vital Signs:   /74   Pulse 104   Temp 96.9 °F (36.1 °C) (Temporal)   Ht 158 cm (62.21\")   Wt 57.3 kg (126 lb 6.4 oz)   SpO2 94%   BMI 22.96 kg/m²     BMI is within normal parameters. No follow-up required.      Physical Exam  Vitals and nursing note reviewed.   Constitutional:       General: She is not in acute distress.     Appearance: She is well-developed.   Cardiovascular:      Rate and Rhythm: Normal rate and regular rhythm.      Heart sounds: Normal heart sounds.   Pulmonary:      Effort: Pulmonary effort is normal.      Breath sounds: Normal breath sounds.   Musculoskeletal:      Cervical back: Normal range of motion.   Skin:     General: Skin is warm and dry.   Psychiatric:         Mood and Affect: Mood normal.        Result Review :   The following data was reviewed by: Gal Ortega MD on 04/04/2022:  Common labs    Common Labsle 4/28/21 2/10/22 2/10/22     1555 1555   Glucose   104   BUN   23 (A)   Creatinine 1.20  1.12 (A)   eGFR Non African Am   45 (A)   Sodium   139   Potassium   4.0   Chloride   97 (A)   Calcium   9.9   Albumin   4.20   Total Bilirubin   0.9   Alkaline Phosphatase   74   AST " (SGOT)   19   ALT (SGPT)   15   WBC  7.77    Hemoglobin  13.7    Hematocrit  41.6    Platelets  277    (A) Abnormal value       Comments are available for some flowsheets but are not being displayed.                     Assessment and Plan    Diagnoses and all orders for this visit:    1. Anxiety (Primary)    2. Benzodiazepine dependence (HCC)    3. Essential hypertension    4. Malignant neoplasm of overlapping sites of right breast in female, estrogen receptor positive (HCC)      Longstanding anxiety and benzodiazepine tolerance and dependence.  Attempts to wean have failed.  She will continue the low-dose of lorazepam twice a day as needed.  Benefits likely outweigh risks.  But patient and family understands the risks are still considerable including fall risk.    Hypertension.  Controlled.  Continue triamterene hydrochlorothiazide.    Breast cancer.  Remote.  Continues to follow with her oncologist.    Follow-up in 6 months for Medicare wellness visit and recheck      Follow Up   No follow-ups on file.  Patient was given instructions and counseling regarding her condition or for health maintenance advice. Please see specific information pulled into the AVS if appropriate.

## 2022-06-23 DIAGNOSIS — F13.20 BENZODIAZEPINE DEPENDENCE: ICD-10-CM

## 2022-06-23 NOTE — TELEPHONE ENCOUNTER
Rx Refill Note  Requested Prescriptions     Pending Prescriptions Disp Refills   • LORazepam (ATIVAN) 0.5 MG tablet [Pharmacy Med Name: LORazepam 0.5 MG Oral Tablet] 60 tablet 0     Sig: Take 1/2 (one-half) tablet by mouth twice daily      Last office visit with prescribing clinician: 4/4/2022      Next office visit with prescribing clinician: 10/6/2022            Catherine Apodaca Rep  06/23/22, 16:30 EDT

## 2022-06-24 RX ORDER — LORAZEPAM 0.5 MG/1
TABLET ORAL
Qty: 60 TABLET | Refills: 0 | Status: SHIPPED | OUTPATIENT
Start: 2022-06-24 | End: 2022-08-17

## 2022-08-09 RX ORDER — LANSOPRAZOLE 30 MG/1
CAPSULE, DELAYED RELEASE ORAL
Qty: 90 CAPSULE | Refills: 1 | Status: SHIPPED | OUTPATIENT
Start: 2022-08-09 | End: 2023-02-28

## 2022-08-09 RX ORDER — TRIAMTERENE AND HYDROCHLOROTHIAZIDE 37.5; 25 MG/1; MG/1
CAPSULE ORAL
Qty: 90 CAPSULE | Refills: 1 | Status: SHIPPED | OUTPATIENT
Start: 2022-08-09 | End: 2022-08-26

## 2022-08-09 NOTE — TELEPHONE ENCOUNTER
Rx Refill Note  Requested Prescriptions     Pending Prescriptions Disp Refills   • lansoprazole (PREVACID) 30 MG capsule [Pharmacy Med Name: Lansoprazole 30 MG Oral Capsule Delayed Release] 90 capsule 1     Sig: Take 1 capsule by mouth once daily   • triamterene-hydrochlorothiazide (DYAZIDE) 37.5-25 MG per capsule [Pharmacy Med Name: Triamterene-HCTZ 37.5-25 MG Oral Capsule] 90 capsule 1     Sig: Take 1 capsule by mouth once daily      Last office visit with prescribing clinician: 4/4/2022      Next office visit with prescribing clinician: 10/6/2022            Murali Sparrow  08/09/22, 11:33 EDT

## 2022-08-17 DIAGNOSIS — F13.20 BENZODIAZEPINE DEPENDENCE: ICD-10-CM

## 2022-08-17 RX ORDER — LORAZEPAM 0.5 MG/1
TABLET ORAL
Qty: 60 TABLET | Refills: 0 | Status: SHIPPED | OUTPATIENT
Start: 2022-08-17 | End: 2022-10-17

## 2022-08-17 NOTE — TELEPHONE ENCOUNTER
Rx Refill Note  Requested Prescriptions     Pending Prescriptions Disp Refills   • LORazepam (ATIVAN) 0.5 MG tablet [Pharmacy Med Name: LORazepam 0.5 MG Oral Tablet] 60 tablet 0     Sig: Take 1/2 (one-half) tablet by mouth twice daily      Last office visit with prescribing clinician: 4/4/2022      Next office visit with prescribing clinician: 10/6/2022            Murali Sparrow  08/17/22, 13:10 EDT

## 2022-08-21 ENCOUNTER — HOSPITAL ENCOUNTER (OUTPATIENT)
Facility: HOSPITAL | Age: 87
Setting detail: OBSERVATION
LOS: 1 days | Discharge: HOME HEALTH | End: 2022-08-23
Attending: EMERGENCY MEDICINE | Admitting: INTERNAL MEDICINE

## 2022-08-21 DIAGNOSIS — R93.0 ABNORMAL CT OF THE HEAD: ICD-10-CM

## 2022-08-21 DIAGNOSIS — I95.1 ORTHOSTASIS: ICD-10-CM

## 2022-08-21 DIAGNOSIS — R53.1 WEAKNESS: ICD-10-CM

## 2022-08-21 DIAGNOSIS — R55 NEAR SYNCOPE: ICD-10-CM

## 2022-08-21 DIAGNOSIS — R42 DIZZY SPELLS: Primary | ICD-10-CM

## 2022-08-21 LAB
APTT PPP: 29.5 SECONDS (ref 22.7–35.4)
BASOPHILS # BLD AUTO: 0.04 10*3/MM3 (ref 0–0.2)
BASOPHILS NFR BLD AUTO: 0.6 % (ref 0–1.5)
DEPRECATED RDW RBC AUTO: 40.9 FL (ref 37–54)
EOSINOPHIL # BLD AUTO: 0.17 10*3/MM3 (ref 0–0.4)
EOSINOPHIL NFR BLD AUTO: 2.5 % (ref 0.3–6.2)
ERYTHROCYTE [DISTWIDTH] IN BLOOD BY AUTOMATED COUNT: 12.7 % (ref 12.3–15.4)
HCT VFR BLD AUTO: 37.8 % (ref 34–46.6)
HGB BLD-MCNC: 12.8 G/DL (ref 12–15.9)
IMM GRANULOCYTES # BLD AUTO: 0.02 10*3/MM3 (ref 0–0.05)
IMM GRANULOCYTES NFR BLD AUTO: 0.3 % (ref 0–0.5)
INR PPP: 0.99 (ref 0.9–1.1)
LYMPHOCYTES # BLD AUTO: 1.78 10*3/MM3 (ref 0.7–3.1)
LYMPHOCYTES NFR BLD AUTO: 26.1 % (ref 19.6–45.3)
MCH RBC QN AUTO: 30.4 PG (ref 26.6–33)
MCHC RBC AUTO-ENTMCNC: 33.9 G/DL (ref 31.5–35.7)
MCV RBC AUTO: 89.8 FL (ref 79–97)
MONOCYTES # BLD AUTO: 0.71 10*3/MM3 (ref 0.1–0.9)
MONOCYTES NFR BLD AUTO: 10.4 % (ref 5–12)
NEUTROPHILS NFR BLD AUTO: 4.09 10*3/MM3 (ref 1.7–7)
NEUTROPHILS NFR BLD AUTO: 60.1 % (ref 42.7–76)
NRBC BLD AUTO-RTO: 0 /100 WBC (ref 0–0.2)
PLATELET # BLD AUTO: 224 10*3/MM3 (ref 140–450)
PMV BLD AUTO: 9.6 FL (ref 6–12)
PROTHROMBIN TIME: 13 SECONDS (ref 11.7–14.2)
RBC # BLD AUTO: 4.21 10*6/MM3 (ref 3.77–5.28)
WBC NRBC COR # BLD: 6.81 10*3/MM3 (ref 3.4–10.8)

## 2022-08-21 PROCEDURE — 85025 COMPLETE CBC W/AUTO DIFF WBC: CPT | Performed by: EMERGENCY MEDICINE

## 2022-08-21 PROCEDURE — 93005 ELECTROCARDIOGRAM TRACING: CPT | Performed by: EMERGENCY MEDICINE

## 2022-08-21 PROCEDURE — 99284 EMERGENCY DEPT VISIT MOD MDM: CPT

## 2022-08-21 PROCEDURE — 85730 THROMBOPLASTIN TIME PARTIAL: CPT | Performed by: EMERGENCY MEDICINE

## 2022-08-21 PROCEDURE — 80053 COMPREHEN METABOLIC PANEL: CPT | Performed by: EMERGENCY MEDICINE

## 2022-08-21 PROCEDURE — 99285 EMERGENCY DEPT VISIT HI MDM: CPT

## 2022-08-21 PROCEDURE — 36415 COLL VENOUS BLD VENIPUNCTURE: CPT

## 2022-08-21 PROCEDURE — 84484 ASSAY OF TROPONIN QUANT: CPT | Performed by: EMERGENCY MEDICINE

## 2022-08-21 PROCEDURE — 93010 ELECTROCARDIOGRAM REPORT: CPT | Performed by: INTERNAL MEDICINE

## 2022-08-21 PROCEDURE — 85610 PROTHROMBIN TIME: CPT | Performed by: EMERGENCY MEDICINE

## 2022-08-21 RX ORDER — SODIUM CHLORIDE 0.9 % (FLUSH) 0.9 %
10 SYRINGE (ML) INJECTION AS NEEDED
Status: DISCONTINUED | OUTPATIENT
Start: 2022-08-21 | End: 2022-08-23 | Stop reason: HOSPADM

## 2022-08-22 ENCOUNTER — APPOINTMENT (OUTPATIENT)
Dept: CT IMAGING | Facility: HOSPITAL | Age: 87
End: 2022-08-22

## 2022-08-22 ENCOUNTER — APPOINTMENT (OUTPATIENT)
Dept: MRI IMAGING | Facility: HOSPITAL | Age: 87
End: 2022-08-22

## 2022-08-22 PROBLEM — R42 DIZZINESS: Status: ACTIVE | Noted: 2022-08-22

## 2022-08-22 PROBLEM — R42 DIZZY SPELLS: Status: ACTIVE | Noted: 2022-08-22

## 2022-08-22 LAB
ALBUMIN SERPL-MCNC: 3.9 G/DL (ref 3.5–5.2)
ALBUMIN/GLOB SERPL: 1.6 G/DL
ALP SERPL-CCNC: 70 U/L (ref 39–117)
ALT SERPL W P-5'-P-CCNC: 8 U/L (ref 1–33)
ANION GAP SERPL CALCULATED.3IONS-SCNC: 11 MMOL/L (ref 5–15)
ANION GAP SERPL CALCULATED.3IONS-SCNC: 11.6 MMOL/L (ref 5–15)
AST SERPL-CCNC: 24 U/L (ref 1–32)
BACTERIA UR QL AUTO: NORMAL /HPF
BASOPHILS # BLD AUTO: 0.04 10*3/MM3 (ref 0–0.2)
BASOPHILS NFR BLD AUTO: 0.6 % (ref 0–1.5)
BILIRUB SERPL-MCNC: 0.6 MG/DL (ref 0–1.2)
BILIRUB UR QL STRIP: NEGATIVE
BUN SERPL-MCNC: 18 MG/DL (ref 8–23)
BUN SERPL-MCNC: 22 MG/DL (ref 8–23)
BUN/CREAT SERPL: 19.1 (ref 7–25)
BUN/CREAT SERPL: 21.4 (ref 7–25)
CALCIUM SPEC-SCNC: 9.3 MG/DL (ref 8.2–9.6)
CALCIUM SPEC-SCNC: 9.7 MG/DL (ref 8.2–9.6)
CHLORIDE SERPL-SCNC: 96 MMOL/L (ref 98–107)
CHLORIDE SERPL-SCNC: 99 MMOL/L (ref 98–107)
CHOLEST SERPL-MCNC: 282 MG/DL (ref 0–200)
CLARITY UR: CLEAR
CO2 SERPL-SCNC: 26 MMOL/L (ref 22–29)
CO2 SERPL-SCNC: 30.4 MMOL/L (ref 22–29)
COLOR UR: YELLOW
CREAT SERPL-MCNC: 0.94 MG/DL (ref 0.57–1)
CREAT SERPL-MCNC: 1.03 MG/DL (ref 0.57–1)
DEPRECATED RDW RBC AUTO: 41.5 FL (ref 37–54)
EGFRCR SERPLBLD CKD-EPI 2021: 50.8 ML/MIN/1.73
EGFRCR SERPLBLD CKD-EPI 2021: 56.7 ML/MIN/1.73
EOSINOPHIL # BLD AUTO: 0.16 10*3/MM3 (ref 0–0.4)
EOSINOPHIL NFR BLD AUTO: 2.5 % (ref 0.3–6.2)
ERYTHROCYTE [DISTWIDTH] IN BLOOD BY AUTOMATED COUNT: 12.5 % (ref 12.3–15.4)
GLOBULIN UR ELPH-MCNC: 2.4 GM/DL
GLUCOSE BLDC GLUCOMTR-MCNC: 102 MG/DL (ref 70–130)
GLUCOSE BLDC GLUCOMTR-MCNC: 107 MG/DL (ref 70–130)
GLUCOSE SERPL-MCNC: 107 MG/DL (ref 65–99)
GLUCOSE SERPL-MCNC: 108 MG/DL (ref 65–99)
GLUCOSE UR STRIP-MCNC: NEGATIVE MG/DL
HBA1C MFR BLD: 5.5 % (ref 4.8–5.6)
HCT VFR BLD AUTO: 39.6 % (ref 34–46.6)
HDLC SERPL-MCNC: 53 MG/DL (ref 40–60)
HGB BLD-MCNC: 13.4 G/DL (ref 12–15.9)
HGB UR QL STRIP.AUTO: NEGATIVE
HYALINE CASTS UR QL AUTO: NORMAL /LPF
IMM GRANULOCYTES # BLD AUTO: 0.02 10*3/MM3 (ref 0–0.05)
IMM GRANULOCYTES NFR BLD AUTO: 0.3 % (ref 0–0.5)
INR PPP: 0.97 (ref 0.9–1.1)
KETONES UR QL STRIP: NEGATIVE
LDLC SERPL CALC-MCNC: 197 MG/DL (ref 0–100)
LDLC/HDLC SERPL: 3.68 {RATIO}
LEUKOCYTE ESTERASE UR QL STRIP.AUTO: ABNORMAL
LYMPHOCYTES # BLD AUTO: 1.95 10*3/MM3 (ref 0.7–3.1)
LYMPHOCYTES NFR BLD AUTO: 30.2 % (ref 19.6–45.3)
MCH RBC QN AUTO: 31 PG (ref 26.6–33)
MCHC RBC AUTO-ENTMCNC: 33.8 G/DL (ref 31.5–35.7)
MCV RBC AUTO: 91.7 FL (ref 79–97)
MONOCYTES # BLD AUTO: 0.6 10*3/MM3 (ref 0.1–0.9)
MONOCYTES NFR BLD AUTO: 9.3 % (ref 5–12)
NEUTROPHILS NFR BLD AUTO: 3.69 10*3/MM3 (ref 1.7–7)
NEUTROPHILS NFR BLD AUTO: 57.1 % (ref 42.7–76)
NITRITE UR QL STRIP: NEGATIVE
NRBC BLD AUTO-RTO: 0 /100 WBC (ref 0–0.2)
PH UR STRIP.AUTO: 7.5 [PH] (ref 5–8)
PLATELET # BLD AUTO: 245 10*3/MM3 (ref 140–450)
PMV BLD AUTO: 9.6 FL (ref 6–12)
POTASSIUM SERPL-SCNC: 3.8 MMOL/L (ref 3.5–5.2)
POTASSIUM SERPL-SCNC: 4.9 MMOL/L (ref 3.5–5.2)
PROT SERPL-MCNC: 6.3 G/DL (ref 6–8.5)
PROT UR QL STRIP: NEGATIVE
PROTHROMBIN TIME: 12.8 SECONDS (ref 11.7–14.2)
QT INTERVAL: 385 MS
RBC # BLD AUTO: 4.32 10*6/MM3 (ref 3.77–5.28)
RBC # UR STRIP: NORMAL /HPF
REF LAB TEST METHOD: NORMAL
SARS-COV-2 RNA RESP QL NAA+PROBE: NOT DETECTED
SODIUM SERPL-SCNC: 136 MMOL/L (ref 136–145)
SODIUM SERPL-SCNC: 138 MMOL/L (ref 136–145)
SP GR UR STRIP: 1.02 (ref 1–1.03)
SQUAMOUS #/AREA URNS HPF: NORMAL /HPF
TRIGL SERPL-MCNC: 169 MG/DL (ref 0–150)
TROPONIN T SERPL-MCNC: <0.01 NG/ML (ref 0–0.03)
UROBILINOGEN UR QL STRIP: ABNORMAL
VLDLC SERPL-MCNC: 32 MG/DL (ref 5–40)
WBC # UR STRIP: NORMAL /HPF
WBC NRBC COR # BLD: 6.46 10*3/MM3 (ref 3.4–10.8)

## 2022-08-22 PROCEDURE — A9577 INJ MULTIHANCE: HCPCS | Performed by: INTERNAL MEDICINE

## 2022-08-22 PROCEDURE — G0378 HOSPITAL OBSERVATION PER HR: HCPCS

## 2022-08-22 PROCEDURE — 80061 LIPID PANEL: CPT | Performed by: NURSE PRACTITIONER

## 2022-08-22 PROCEDURE — 85025 COMPLETE CBC W/AUTO DIFF WBC: CPT | Performed by: NURSE PRACTITIONER

## 2022-08-22 PROCEDURE — 0 GADOBENATE DIMEGLUMINE 529 MG/ML SOLUTION: Performed by: INTERNAL MEDICINE

## 2022-08-22 PROCEDURE — 97162 PT EVAL MOD COMPLEX 30 MIN: CPT

## 2022-08-22 PROCEDURE — 81001 URINALYSIS AUTO W/SCOPE: CPT | Performed by: EMERGENCY MEDICINE

## 2022-08-22 PROCEDURE — 85610 PROTHROMBIN TIME: CPT | Performed by: NURSE PRACTITIONER

## 2022-08-22 PROCEDURE — 70553 MRI BRAIN STEM W/O & W/DYE: CPT

## 2022-08-22 PROCEDURE — 0 IOPAMIDOL PER 1 ML: Performed by: EMERGENCY MEDICINE

## 2022-08-22 PROCEDURE — 83036 HEMOGLOBIN GLYCOSYLATED A1C: CPT | Performed by: NURSE PRACTITIONER

## 2022-08-22 PROCEDURE — 70496 CT ANGIOGRAPHY HEAD: CPT

## 2022-08-22 PROCEDURE — 99214 OFFICE O/P EST MOD 30 MIN: CPT | Performed by: NURSE PRACTITIONER

## 2022-08-22 PROCEDURE — 80048 BASIC METABOLIC PNL TOTAL CA: CPT | Performed by: NURSE PRACTITIONER

## 2022-08-22 PROCEDURE — 70498 CT ANGIOGRAPHY NECK: CPT

## 2022-08-22 PROCEDURE — 97530 THERAPEUTIC ACTIVITIES: CPT

## 2022-08-22 PROCEDURE — 82962 GLUCOSE BLOOD TEST: CPT

## 2022-08-22 PROCEDURE — U0003 INFECTIOUS AGENT DETECTION BY NUCLEIC ACID (DNA OR RNA); SEVERE ACUTE RESPIRATORY SYNDROME CORONAVIRUS 2 (SARS-COV-2) (CORONAVIRUS DISEASE [COVID-19]), AMPLIFIED PROBE TECHNIQUE, MAKING USE OF HIGH THROUGHPUT TECHNOLOGIES AS DESCRIBED BY CMS-2020-01-R: HCPCS | Performed by: EMERGENCY MEDICINE

## 2022-08-22 PROCEDURE — 97166 OT EVAL MOD COMPLEX 45 MIN: CPT

## 2022-08-22 RX ORDER — ACETAMINOPHEN 650 MG/1
650 SUPPOSITORY RECTAL EVERY 4 HOURS PRN
Status: DISCONTINUED | OUTPATIENT
Start: 2022-08-22 | End: 2022-08-23 | Stop reason: HOSPADM

## 2022-08-22 RX ORDER — ASPIRIN 81 MG/1
81 TABLET ORAL DAILY
Status: DISCONTINUED | OUTPATIENT
Start: 2022-08-23 | End: 2022-08-23 | Stop reason: HOSPADM

## 2022-08-22 RX ORDER — ASPIRIN 325 MG
325 TABLET ORAL DAILY
Status: DISCONTINUED | OUTPATIENT
Start: 2022-08-22 | End: 2022-08-22

## 2022-08-22 RX ORDER — NITROGLYCERIN 0.4 MG/1
0.4 TABLET SUBLINGUAL
Status: DISCONTINUED | OUTPATIENT
Start: 2022-08-22 | End: 2022-08-23 | Stop reason: HOSPADM

## 2022-08-22 RX ORDER — TRIAMTERENE AND HYDROCHLOROTHIAZIDE 37.5; 25 MG/1; MG/1
1 TABLET ORAL DAILY
Refills: 1 | Status: DISCONTINUED | OUTPATIENT
Start: 2022-08-22 | End: 2022-08-23 | Stop reason: HOSPADM

## 2022-08-22 RX ORDER — PANTOPRAZOLE SODIUM 40 MG/1
40 TABLET, DELAYED RELEASE ORAL EVERY MORNING
Refills: 1 | Status: DISCONTINUED | OUTPATIENT
Start: 2022-08-22 | End: 2022-08-23 | Stop reason: HOSPADM

## 2022-08-22 RX ORDER — SODIUM CHLORIDE 0.9 % (FLUSH) 0.9 %
10 SYRINGE (ML) INJECTION AS NEEDED
Status: DISCONTINUED | OUTPATIENT
Start: 2022-08-22 | End: 2022-08-23 | Stop reason: HOSPADM

## 2022-08-22 RX ORDER — ONDANSETRON 2 MG/ML
4 INJECTION INTRAMUSCULAR; INTRAVENOUS EVERY 6 HOURS PRN
Status: DISCONTINUED | OUTPATIENT
Start: 2022-08-22 | End: 2022-08-23 | Stop reason: HOSPADM

## 2022-08-22 RX ORDER — LORAZEPAM 0.5 MG/1
0.25 TABLET ORAL 2 TIMES DAILY
Status: DISCONTINUED | OUTPATIENT
Start: 2022-08-22 | End: 2022-08-23

## 2022-08-22 RX ORDER — ACETAMINOPHEN 160 MG/5ML
650 SOLUTION ORAL EVERY 4 HOURS PRN
Status: DISCONTINUED | OUTPATIENT
Start: 2022-08-22 | End: 2022-08-23 | Stop reason: HOSPADM

## 2022-08-22 RX ORDER — ACETAMINOPHEN 325 MG/1
650 TABLET ORAL EVERY 4 HOURS PRN
Status: DISCONTINUED | OUTPATIENT
Start: 2022-08-22 | End: 2022-08-23 | Stop reason: HOSPADM

## 2022-08-22 RX ORDER — ASPIRIN 300 MG/1
300 SUPPOSITORY RECTAL DAILY
Status: DISCONTINUED | OUTPATIENT
Start: 2022-08-22 | End: 2022-08-22

## 2022-08-22 RX ORDER — ATORVASTATIN CALCIUM 80 MG/1
80 TABLET, FILM COATED ORAL NIGHTLY
Status: DISCONTINUED | OUTPATIENT
Start: 2022-08-22 | End: 2022-08-22

## 2022-08-22 RX ORDER — SODIUM CHLORIDE 0.9 % (FLUSH) 0.9 %
10 SYRINGE (ML) INJECTION EVERY 12 HOURS SCHEDULED
Status: DISCONTINUED | OUTPATIENT
Start: 2022-08-22 | End: 2022-08-23 | Stop reason: HOSPADM

## 2022-08-22 RX ORDER — ATORVASTATIN CALCIUM 20 MG/1
40 TABLET, FILM COATED ORAL NIGHTLY
Status: DISCONTINUED | OUTPATIENT
Start: 2022-08-22 | End: 2022-08-23 | Stop reason: HOSPADM

## 2022-08-22 RX ADMIN — Medication 10 ML: at 08:58

## 2022-08-22 RX ADMIN — LORAZEPAM 0.25 MG: 0.5 TABLET ORAL at 08:58

## 2022-08-22 RX ADMIN — LORAZEPAM 0.25 MG: 0.5 TABLET ORAL at 20:50

## 2022-08-22 RX ADMIN — IOPAMIDOL 95 ML: 755 INJECTION, SOLUTION INTRAVENOUS at 00:31

## 2022-08-22 RX ADMIN — Medication 10 ML: at 20:51

## 2022-08-22 RX ADMIN — PANTOPRAZOLE SODIUM 40 MG: 40 TABLET, DELAYED RELEASE ORAL at 06:05

## 2022-08-22 RX ADMIN — ASPIRIN 325 MG: 325 TABLET ORAL at 08:58

## 2022-08-22 RX ADMIN — GADOBENATE DIMEGLUMINE 11 ML: 529 INJECTION, SOLUTION INTRAVENOUS at 22:31

## 2022-08-22 NOTE — CONSULTS
"DOS: 2022  NAME: Chaor Thomas   : 4/3/1929  PCP: Gal Ortega MD  CC: Dizziness  Referring MD: Aj Cruz MD    Neurological Problem and Interval History:  89 y.o. female with known history of prior right-sided breast cancer, H. pylori, hyperlipidemia, hypertension, migraine headaches, B12 deficiency, psychiatric disorder and dry eye syndrome who presented to [facility]  due to report of dizziness described as \"lightheadedness\" which patient and daughter report is chronic at baseline; this episode just was worse than patient's baseline vertigo/dizziness.     Patient reports that she was sitting in her chair watching TV and noted her vision to be somewhat blurry which is not an uncommon finding due to her chronic dry eyes and she felt lightheaded which she attributed to staying up too long therefore she got up to go to bed on her way to the bed she felt somewhat worse although did not stagger and/or gravitate to the left of the right and did not report any diplopia.  Patient mated to the bed and did not improve once sitting down-she did not bite down to see if symptoms improve.  She called her daughter who felt that she should come to the hospital.  She denies any unilateral weakness/loss of vision and/or associated speech difficulty specifically no word finding issues and/or slurred speech.  Prior to arrival patient not on antiplatelet or statin medication since arrival patient started on full dose aspirin and high-dose statin symptoms have essentially resolved.  UA showed 1+ leukocytes otherwise unremarkable.  COVID-negative.  Initial CTA of the head and neck showed tiny intimal flap within the left vertebral artery at C4 level concerning for plaque versus small focal dissection. Mild focal narrowing of left vertebral artery noted at C2 level approximately 30% remainder of imaging unremarkable.  MRI of the brain pending.    Neurologically, stable at neurologic baseline.  " Patient denies any new complaints or concerns on my exam.  Daughter questioning neurology consult prior to completion of MRI; discussed neurology involvement-patient and daughter approved continuing completion of neurology consultation.        Past Medical/Surgical Hx:  Past Medical History:   Diagnosis Date   • Benign esophageal stricture    • Breast cancer (HCC) 06/30/2010    Right breast w/papillary features, primary tumor size 1.9 cm w/negative margins   • Breast cancer in male, right 2013    Right breast, stage I, invasive   • Diverticular disease of colon    • H. pylori infection    • H/O Migraines    • Hyperlipidemia    • Hyperplastic polyps of stomach    • Hypertension    • Knee swelling 1995   • Osteoporosis    • Psychiatric disorder    • Vitamin B12 deficiency      Past Surgical History:   Procedure Laterality Date   • APPENDECTOMY  1944   • BREAST BIOPSY Right 2010   • BREAST LUMPECTOMY     • BREAST LUMPECTOMY WITH SENTINEL NODE BIOPSY Right 2013   • COLONOSCOPY      Diverticular disease   • DILATATION AND CURETTAGE  1957, 1971    x2       Review of Systems:        A complete review of all systems is negative except as described above.     Medications On Admission  Medications Prior to Admission   Medication Sig Dispense Refill Last Dose   • B Complex Vitamins (VITAMIN-B COMPLEX PO) Take  by mouth daily.      • Diclofenac Sodium (VOLTAREN) 1 % gel gel Apply 4 g topically to the appropriate area as directed 4 (Four) Times a Day As Needed.      • lansoprazole (PREVACID) 30 MG capsule Take 1 capsule by mouth once daily 90 capsule 1    • LORazepam (ATIVAN) 0.5 MG tablet Take 1/2 (one-half) tablet by mouth twice daily 60 tablet 0    • triamterene-hydrochlorothiazide (DYAZIDE) 37.5-25 MG per capsule Take 1 capsule by mouth once daily 90 capsule 1    • Cholecalciferol (VITAMIN D) 2000 UNITS capsule Take  by mouth.          Allergies:  Allergies   Allergen Reactions   • Cortisone    • Diphenhydramine Hcl (Sleep)     • Epinephrine    • Penicillins    • Sulfa Antibiotics        Social Hx:  Social History     Socioeconomic History   • Marital status:      Spouse name: Marvin   • Number of children: 1   • Years of education: High School   Tobacco Use   • Smoking status: Never Smoker   • Smokeless tobacco: Never Used   Vaping Use   • Vaping Use: Never used   Substance and Sexual Activity   • Alcohol use: No   • Drug use: No   • Sexual activity: Never       Family Hx:  Family History   Problem Relation Age of Onset   • Dementia Mother    • Cancer Mother 81        Breast removed   • Cancer Father 69        Throat; smoker   • Cancer Maternal Aunt         2 aunts at age 49 and 70; deaths unrelated to cancer   • Other Son         Brain tumor   • Hypertension Son        Review of Imaging (Interpretation of images not reports):  NONCONTRAST CRANIAL CT SCAN, CT ANGIOGRAM NECK, CT ANGIOGRAM HEAD.     HISTORY: Dizziness.     COMPARISON: 04/28/2022.     TECHNIQUE:  Radiation dose reduction techniques were utilized, including  automated exposure control and exposure modulation based on body size.   Initially, a routine noncontrast cranial CT performed from the skull  base through the vertex region. After review, thin-section contrast  enhanced CT angiogram images obtained from the aortic arch through the  calvarial vertex utilizing angiographic technique. Multi projection 3-D  MIP reformatted images were supplemented and reviewed.        FINDINGS CRANIAL CT: No acute hemorrhage or midline shift is  demonstrated..  There is diffuse atrophy. There is periventricular and  deep white matter microangiopathic change. There is no midline shift or  mass effect..  Postcontrast imaging does not show any evidence of venous  occlusion or abnormal enhancement.   Bone window images demonstrate some  mucosal thickening noted within the ethmoid sinuses, as well as mucous  retention cyst within the right maxillary sinus.     CERVICAL CAROTID CT  ANGIOGRAM:     FINDINGS: The patient has an aberrant right subclavian artery. There is  atherosclerotic involvement of the aortic arch. There is a common origin  of the brachiocephalic artery and left common carotid artery. Right  common carotid artery is widely patent. There is some mild plaque at the  right carotid bifurcation, without any hemodynamically significant  stenosis. Cervical right internal carotid artery is widely patent. Left  common carotid artery is also widely patent. Left internal carotid  artery is unremarkable within its cervical portion. Right vertebral  artery is unremarkable throughout its course. There is potentially a  focal intimal flap versus calcified plaque involving the left vertebral  artery at the level of C4. It is best seen on image 8 of the axial  series. There is also an area of narrowing within the left vertebral  artery the level of C5, in the range of about 30%. The remainder of the  cervical left vertebral artery is unremarkable. Images through the lung  apices demonstrate some areas of pleural thickening, particularly on the  right. There are scattered nodules noted within the thyroid gland.     NASCET criteria utilized in stenosis measurements. Stenosis mild, 0-49%.        CRANIAL CTA ANGIOGRAM:     FINDINGS:  The intracranial internal carotid arteries are widely patent.  There is an anterior communicating artery. Anterior cerebral arteries  are unremarkable. Left A1 is small in caliber. Middle cerebral arteries  are patent bilaterally.  Intracranial vertebral arteries are  unremarkable, as is the basilar artery. There is a posterior  communicating artery on the left. Posterior cerebral arteries appear  normal..             AI analysis of LVO was utilized.     Radiation dose reduction techniques were utilized, including automated  exposure control and exposure modulation based on body size.        IMPRESSION:  1. No acute intracranial findings.  2. Suggestion of a tiny  "intimal flap within the left vertebral artery at  the level of C4 on the axial images only. It may represent area of  eccentric calcified plaque, but small focal dissection is not excluded.  It is best seen on image 8 of the axial series. There is a further mild  focal narrowing of the left vertebral artery at the level of C2, in the  range of 30%.  3. Remainder of the cervical vasculature is widely patent.  4. Intracranial vessels are widely patent.     Preliminary report was called to Dr. Ramos at 1:03 AM.        Radiation dose reduction techniques were utilized, including automated  exposure control and exposure modulation based on body size.     This report was finalized on 8/22/2022 2:05 AM by Dr. Herlinda Pierson M.D.         Laboratory Results:   Lab Results   Component Value Date    GLUCOSE 107 (H) 08/22/2022    CALCIUM 9.7 (H) 08/22/2022     08/22/2022    K 3.8 08/22/2022    CO2 30.4 (H) 08/22/2022    CL 96 (L) 08/22/2022    BUN 18 08/22/2022    CREATININE 0.94 08/22/2022    EGFRIFAFRI 64 04/28/2017    EGFRIFNONA 45 (L) 02/10/2022    BCR 19.1 08/22/2022    ANIONGAP 11.6 08/22/2022     Lab Results   Component Value Date    WBC 6.46 08/22/2022    HGB 13.4 08/22/2022    HCT 39.6 08/22/2022    MCV 91.7 08/22/2022     08/22/2022     Lab Results   Component Value Date    CHOL 282 (H) 08/22/2022     Lab Results   Component Value Date    HDL 53 08/22/2022     Lab Results   Component Value Date     (H) 08/22/2022     Lab Results   Component Value Date    TRIG 169 (H) 08/22/2022     Lab Results   Component Value Date    HGBA1C 5.50 08/22/2022     Lab Results   Component Value Date    INR 0.97 08/22/2022    INR 0.99 08/21/2022    PROTIME 12.8 08/22/2022    PROTIME 13.0 08/21/2022         Physical Examination:  /61 (BP Location: Left arm, Patient Position: Lying)   Pulse 70   Temp 97.8 °F (36.6 °C) (Oral)   Resp 18   Ht 157.5 cm (62\")   Wt 55.8 kg (123 lb 0.3 oz)   SpO2 97%   BMI " 22.50 kg/m²   General Appearance:   Well developed, well nourished, well groomed, alert, and cooperative.  HEENT: Normocephalic.    Neck and Spine: Normal range of motion.  Normal alignment. No mass or tenderness. No bruits.  Cardiac: Regular rate and rhythm. No murmurs.  Peripheral Vasculature: Radial and pedal pulses are equal and     symmetric.   Extremities:    No edema or deformities. Normal joint     ROM.   Skin:    No rashes or birth marks.    Neurological examination:  Higher Integrative  Function: Oriented to time, place and person. Normal registration, recall, attention span and concentration. Normal language including comprehension, spontaneous speech, repetition, reading, writing, naming and vocabulary. No neglect with normal visual-spatial function and construction. Normal fund of knowledge and higher integrative function.  CN II: Pupils are equal, round, and reactive to light. Normal visual acuity and visual fields.    CN III IV VI: Extraocular movements are full without nystagmus.   CN V: Normal facial sensation and strength of muscles of mastication.  CN VII: Facial movements are symmetric. No weakness.  CN VIII:   Auditory acuity is normal.  CN IX & X:   Symmetric palatal movement.  CN XI: Sternocleidomastoid and trapezius are normal.  No weakness.  CN XII:   The tongue is midline.  No atrophy or fasciculations.  Motor: Normal muscle strength, bulk and tone in upper and lower extremities.  No fasciculations, rigidity, spasticity, or abnormal movements.  Reflexes: Plantar responses are flexor.  Sensation: Normal to light touch in arms and legs.   Coordination:  Finger-to-nose test shows no dysmetria.          Diagnoses:   1. Near syncope w associated dizziness and nausea etiology unclear although CVA should be excluded- MRI Brain pending.   2.  Abnormal CTA; concerning for vertebral dissection-we will repeat imaging in 3 months to further evaluate  3. Hx. HLD       Plan:  MRI Brain- Pending -if MRI  of the brain negative will recommend single antiplatelet and repeat CTA of the head and neck in 3 months if MRI positive for stroke will recommend dual oral antiplatelet x21 days and repeat imaging in 3 months  Decrease Aspirin 81 mg; not on PTA   Hydrate  Neuro checks  Decrease Lipitor 40 mg d/t age;    EKG Tele  PT/OT/ST  Stroke Education  Blood pressure control to <130/80  Goal LDL <70-recommend high dose statins-   Serum glucose < 140    Case reviewed with attending neurologist Dr. Aldo Ronquillo he agrees with treatment plan above.     Le Lazar, APRN

## 2022-08-22 NOTE — PLAN OF CARE
Goal Outcome Evaluation:  Plan of Care Reviewed With: (P) patient, daughter           Outcome Evaluation: (P) Pt is a 89 yo female presenting to the ED w dizziness. PMH of HTN and peripheral neuropathy. Prior to admission, pt was living alone and was indpendent in household mobilty using rwx. Upon exam, pt was independent in bed mobility and able to stand CGA to a rwx. Pt was positive for orthostatics but with mild symptoms, was able to walk 50' CGA. Reported L leg pain with movement, decreased stance time on L LE was noted. Pt had some weakness due to pain on R side for MMT, reports pain and popping in hip and knee with knee flex/ext, hip flex, and ankle DF. 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 Pt will benefit from PT to address impairments. Rec d/c to home w  PT pending progress.

## 2022-08-22 NOTE — H&P
Patient Name:  Charo Thomas  YOB: 1929  MRN:  [id number]  Admit Date:  8/21/2022  Patient Care Team:  Gal Ortega MD as PCP - General (Family Medicine)  Raul Macdonald MD as PCP - Family Medicine  Lalo Edmond MD as Consulting Physician (Hematology and Oncology)  Marvin Tello MD as Referring Physician (Breast Surgery)      Subjective   History Present Illness     Chief Complaint   Patient presents with   • Dizziness     History of Present Illness   Mrs. Thomas is a 89-year-old female with history of hypertension, benzodiazepine dependency, acoustic neuroma, peripheral neuropathy, GERD who presents to the emergency room with dizziness.  Patient describes sensation as lightheadedness, she states this started just hours ago prior to coming to the emergency room, she states she had a similar episode a few weeks ago but it resolved on its own.  She denies any recent fever or illness.  She denies any nausea or vomiting, no chest pain or shortness of breath, no abdominal pain.  In the emergency room patient's troponin was negative, glucose 108, sodium 136, potassium 4.9, creatinine 1.03, BUN 22, INR 1.99, white blood cell count 6.8, hemoglobin 12.8, hematocrit 37.8, platelets 224.  Urinalysis is negative, COVID-19 is negative, CTA of head and neck shows no acute intracranial findings there is suggestion of a tiny intimal flap within the left vertebral artery at the level of C4, it may represent area of calcification but small focal dissection is not excluded.    Review of Systems   Constitutional: Negative for appetite change and fever.   HENT: Negative for nosebleeds and trouble swallowing.    Eyes: Negative for photophobia, redness and visual disturbance.   Respiratory: Negative for cough, chest tightness, shortness of breath and wheezing.    Cardiovascular: Negative for chest pain, palpitations and leg swelling.   Gastrointestinal: Negative for abdominal distention, abdominal pain,  nausea and vomiting.   Endocrine: Negative.    Genitourinary: Negative.    Musculoskeletal: Negative for gait problem and joint swelling.   Skin: Negative.    Neurological: Positive for dizziness and weakness (generalized). Negative for seizures, speech difficulty, light-headedness and headaches.   Hematological: Negative.    Psychiatric/Behavioral: Negative for behavioral problems and confusion.        Personal History     Past Medical History:   Diagnosis Date   • Benign esophageal stricture    • Breast cancer (HCC) 06/30/2010    Right breast w/papillary features, primary tumor size 1.9 cm w/negative margins   • Breast cancer in male, right 2013    Right breast, stage I, invasive   • Diverticular disease of colon    • H. pylori infection    • H/O Migraines    • Hyperlipidemia    • Hyperplastic polyps of stomach    • Hypertension    • Knee swelling 1995   • Osteoporosis    • Psychiatric disorder    • Vitamin B12 deficiency      Past Surgical History:   Procedure Laterality Date   • APPENDECTOMY  1944   • BREAST BIOPSY Right 2010   • BREAST LUMPECTOMY     • BREAST LUMPECTOMY WITH SENTINEL NODE BIOPSY Right 2013   • COLONOSCOPY      Diverticular disease   • DILATATION AND CURETTAGE  1957, 1971    x2     Family History   Problem Relation Age of Onset   • Dementia Mother    • Cancer Mother 81        Breast removed   • Cancer Father 69        Throat; smoker   • Cancer Maternal Aunt         2 aunts at age 49 and 70; deaths unrelated to cancer   • Other Son         Brain tumor   • Hypertension Son      Social History     Tobacco Use   • Smoking status: Never Smoker   • Smokeless tobacco: Never Used   Vaping Use   • Vaping Use: Never used   Substance Use Topics   • Alcohol use: No   • Drug use: No     No current facility-administered medications on file prior to encounter.     Current Outpatient Medications on File Prior to Encounter   Medication Sig Dispense Refill   • B Complex Vitamins (VITAMIN-B COMPLEX PO) Take  by  mouth daily.     • Diclofenac Sodium (VOLTAREN) 1 % gel gel Apply 4 g topically to the appropriate area as directed 4 (Four) Times a Day As Needed.     • lansoprazole (PREVACID) 30 MG capsule Take 1 capsule by mouth once daily 90 capsule 1   • LORazepam (ATIVAN) 0.5 MG tablet Take 1/2 (one-half) tablet by mouth twice daily 60 tablet 0   • triamterene-hydrochlorothiazide (DYAZIDE) 37.5-25 MG per capsule Take 1 capsule by mouth once daily 90 capsule 1   • Cholecalciferol (VITAMIN D) 2000 UNITS capsule Take  by mouth.       Allergies   Allergen Reactions   • Cortisone    • Diphenhydramine Hcl (Sleep)    • Epinephrine    • Penicillins    • Sulfa Antibiotics        Objective    Objective     Vital Signs  Temp:  [97.2 °F (36.2 °C)-97.9 °F (36.6 °C)] 97.9 °F (36.6 °C)  Heart Rate:  [66-81] 68  Resp:  [18] 18  BP: (116-148)/(63-75) 148/75  SpO2:  [95 %-100 %] 95 %  on   ;   Device (Oxygen Therapy): room air  Body mass index is 22.5 kg/m².    Physical Exam  Vitals and nursing note reviewed.   Constitutional:       General: She is not in acute distress.     Appearance: She is well-developed.   HENT:      Head: Normocephalic.   Neck:      Vascular: No JVD.   Cardiovascular:      Rate and Rhythm: Normal rate and regular rhythm.      Heart sounds: Normal heart sounds.      Comments: Normal sinus rhythm on monitor heart rate 78 during my exam, no chest pain or shortness of breath  Pulmonary:      Effort: Pulmonary effort is normal.      Breath sounds: Normal breath sounds.      Comments: Lung sounds clear, sats 97% on room air  Abdominal:      General: There is no distension.      Palpations: Abdomen is soft.      Tenderness: There is no abdominal tenderness.   Musculoskeletal:         General: Normal range of motion.      Cervical back: Normal range of motion.      Right lower leg: No edema.      Left lower leg: No edema.   Skin:     General: Skin is warm and dry.      Capillary Refill: Capillary refill takes less than 2  seconds.   Neurological:      Mental Status: She is alert and oriented to person, place, and time.      Comments: NIHSS 0, patient does have some mild nystagmus and complaints of dizziness when standing up.  She is forgetful and somewhat of a poor historian but follows commands without difficulty and is alert and oriented   Psychiatric:         Mood and Affect: Mood normal.         Behavior: Behavior normal.         Cognition and Memory: Cognition normal.         Judgment: Judgment normal.         Results Review:  I reviewed the patient's new clinical results.  I reviewed the patient's new imaging results and agree with the interpretation.  I reviewed the patient's other test results and agree with the interpretation  I personally viewed and interpreted the patient's EKG/Telemetry data  Discussed with ED provider.    Lab Results (last 24 hours)     Procedure Component Value Units Date/Time    CBC & Differential [[id number]]  (Normal) Collected: 08/21/22 2318    Specimen: Blood Updated: 08/21/22 2327    Narrative:      The following orders were created for panel order CBC & Differential.  Procedure                               Abnormality         Status                     ---------                               -----------         ------                     CBC Auto Differential[[id number]]        Normal              Final result                 Please view results for these tests on the individual orders.    Comprehensive Metabolic Panel [[id number]]  (Abnormal) Collected: 08/21/22 2318    Specimen: Blood Updated: 08/22/22 0015     Glucose 108 mg/dL      BUN 22 mg/dL      Creatinine 1.03 mg/dL      Sodium 136 mmol/L      Potassium 4.9 mmol/L      Comment: Specimen hemolyzed.  Results may be affected.        Chloride 99 mmol/L      CO2 26.0 mmol/L      Calcium 9.3 mg/dL      Total Protein 6.3 g/dL      Albumin 3.90 g/dL      ALT (SGPT) 8 U/L      Comment: Specimen hemolyzed.  Results may be affected.        AST (SGOT)  24 U/L      Comment: Specimen hemolyzed.  Results may be affected.        Alkaline Phosphatase 70 U/L      Total Bilirubin 0.6 mg/dL      Globulin 2.4 gm/dL      A/G Ratio 1.6 g/dL      BUN/Creatinine Ratio 21.4     Anion Gap 11.0 mmol/L      eGFR 50.8 mL/min/1.73      Comment: National Kidney Foundation and American Society of Nephrology (ASN) Task Force recommended calculation based on the Chronic Kidney Disease Epidemiology Collaboration (CKD-EPI) equation refit without adjustment for race.       Narrative:      GFR Normal >60  Chronic Kidney Disease <60  Kidney Failure <15      Protime-INR [[id number]]  (Normal) Collected: 08/21/22 2318    Specimen: Blood Updated: 08/21/22 2349     Protime 13.0 Seconds      INR 0.99    aPTT [[id number]]  (Normal) Collected: 08/21/22 2318    Specimen: Blood Updated: 08/21/22 2349     PTT 29.5 seconds     Troponin [[id number]]  (Normal) Collected: 08/21/22 2318    Specimen: Blood Updated: 08/22/22 0006     Troponin T <0.010 ng/mL      Comment: Specimen hemolyzed.  Results may be affected.       Narrative:      Troponin T Reference Range:  <= 0.03 ng/mL-   Negative for AMI  >0.03 ng/mL-     Abnormal for myocardial necrosis.  Clinicians would have to utilize clinical acumen, EKG, Troponin and serial changes to determine if it is an Acute Myocardial Infarction or myocardial injury due to an underlying chronic condition.       Results may be falsely decreased if patient taking Biotin.      CBC Auto Differential [[id number]]  (Normal) Collected: 08/21/22 2318    Specimen: Blood Updated: 08/21/22 2327     WBC 6.81 10*3/mm3      RBC 4.21 10*6/mm3      Hemoglobin 12.8 g/dL      Hematocrit 37.8 %      MCV 89.8 fL      MCH 30.4 pg      MCHC 33.9 g/dL      RDW 12.7 %      RDW-SD 40.9 fl      MPV 9.6 fL      Platelets 224 10*3/mm3      Neutrophil % 60.1 %      Lymphocyte % 26.1 %      Monocyte % 10.4 %      Eosinophil % 2.5 %      Basophil % 0.6 %      Immature Grans % 0.3 %      Neutrophils,  Absolute 4.09 10*3/mm3      Lymphocytes, Absolute 1.78 10*3/mm3      Monocytes, Absolute 0.71 10*3/mm3      Eosinophils, Absolute 0.17 10*3/mm3      Basophils, Absolute 0.04 10*3/mm3      Immature Grans, Absolute 0.02 10*3/mm3      nRBC 0.0 /100 WBC     COVID PRE-OP / PRE-PROCEDURE SCREENING ORDER (NO ISOLATION) - Swab, Nasopharynx [[id number]]  (Normal) Collected: 08/22/22 0135    Specimen: Swab from Nasopharynx Updated: 08/22/22 0218    Narrative:      The following orders were created for panel order COVID PRE-OP / PRE-PROCEDURE SCREENING ORDER (NO ISOLATION) - Swab, Nasopharynx.  Procedure                               Abnormality         Status                     ---------                               -----------         ------                     COVID-19,[facility]...[[id number]]  Normal              Final result                 Please view results for these tests on the individual orders.    COVID-19,[facility] CEPHEID/LESIA NP SWAB IN TRANSPORT MEDIA 8-12 HR TAT - Swab, Nasopharynx [[id number]]  (Normal) Collected: 08/22/22 0135    Specimen: Swab from Nasopharynx Updated: 08/22/22 0218     COVID19 Not Detected    Narrative:      Fact sheet for providers: https://www.fda.gov/media/779981/download     Fact sheet for patients: https://www.fda.gov/media/596018/download    Urinalysis With Microscopic If Indicated (No Culture) - Urine, Clean Catch [[id number]]  (Abnormal) Collected: 08/22/22 0152    Specimen: Urine, Clean Catch Updated: 08/22/22 0209     Color, UA Yellow     Appearance, UA Clear     pH, UA 7.5     Specific Gravity, UA 1.024     Glucose, UA Negative     Ketones, UA Negative     Bilirubin, UA Negative     Blood, UA Negative     Protein, UA Negative     Leuk Esterase, UA Small (1+)     Nitrite, UA Negative     Urobilinogen, UA 1.0 E.U./dL    Urinalysis, Microscopic Only - Urine, Clean Catch [[id number]] Collected: 08/22/22 0152    Specimen: Urine, Clean Catch Updated: 08/22/22 0209     RBC, UA  0-2 /HPF      WBC, UA 0-2 /HPF      Bacteria, UA None Seen /HPF      Squamous Epithelial Cells, UA 0-2 /HPF      Hyaline Casts, UA None Seen /LPF      Methodology Automated Microscopy          Imaging Results (Last 24 Hours)     Procedure Component Value Units Date/Time    CT Angiogram Head [[id number]] Collected: 08/22/22 0105     Updated: 08/22/22 0208    Narrative:      NONCONTRAST CRANIAL CT SCAN, CT ANGIOGRAM NECK, CT ANGIOGRAM HEAD.     HISTORY: Dizziness.     COMPARISON: 04/28/2022.     TECHNIQUE:  Radiation dose reduction techniques were utilized, including  automated exposure control and exposure modulation based on body size.   Initially, a routine noncontrast cranial CT performed from the skull  base through the vertex region. After review, thin-section contrast  enhanced CT angiogram images obtained from the aortic arch through the  calvarial vertex utilizing angiographic technique. Multi projection 3-D  MIP reformatted images were supplemented and reviewed.        FINDINGS CRANIAL CT: No acute hemorrhage or midline shift is  demonstrated..  There is diffuse atrophy. There is periventricular and  deep white matter microangiopathic change. There is no midline shift or  mass effect..  Postcontrast imaging does not show any evidence of venous  occlusion or abnormal enhancement.   Bone window images demonstrate some  mucosal thickening noted within the ethmoid sinuses, as well as mucous  retention cyst within the right maxillary sinus.     CERVICAL CAROTID CT ANGIOGRAM:     FINDINGS: The patient has an aberrant right subclavian artery. There is  atherosclerotic involvement of the aortic arch. There is a common origin  of the brachiocephalic artery and left common carotid artery. Right  common carotid artery is widely patent. There is some mild plaque at the  right carotid bifurcation, without any hemodynamically significant  stenosis. Cervical right internal carotid artery is widely patent. Left  common  carotid artery is also widely patent. Left internal carotid  artery is unremarkable within its cervical portion. Right vertebral  artery is unremarkable throughout its course. There is potentially a  focal intimal flap versus calcified plaque involving the left vertebral  artery at the level of C4. It is best seen on image 8 of the axial  series. There is also an area of narrowing within the left vertebral  artery the level of C5, in the range of about 30%. The remainder of the  cervical left vertebral artery is unremarkable. Images through the lung  apices demonstrate some areas of pleural thickening, particularly on the  right. There are scattered nodules noted within the thyroid gland.     NASCET criteria utilized in stenosis measurements. Stenosis mild, 0-49%.        CRANIAL CTA ANGIOGRAM:     FINDINGS:  The intracranial internal carotid arteries are widely patent.  There is an anterior communicating artery. Anterior cerebral arteries  are unremarkable. Left A1 is small in caliber. Middle cerebral arteries  are patent bilaterally.  Intracranial vertebral arteries are  unremarkable, as is the basilar artery. There is a posterior  communicating artery on the left. Posterior cerebral arteries appear  normal..             AI analysis of LVO was utilized.     Radiation dose reduction techniques were utilized, including automated  exposure control and exposure modulation based on body size.          Impression:      1. No acute intracranial findings.  2. Suggestion of a tiny intimal flap within the left vertebral artery at  the level of C4 on the axial images only. It may represent area of  eccentric calcified plaque, but small focal dissection is not excluded.  It is best seen on image 8 of the axial series. There is a further mild  focal narrowing of the left vertebral artery at the level of C2, in the  range of 30%.  3. Remainder of the cervical vasculature is widely patent.  4. Intracranial vessels are widely  patent.     Preliminary report was called to Dr. Ramos at 1:03 AM.        Radiation dose reduction techniques were utilized, including automated  exposure control and exposure modulation based on body size.     This report was finalized on 8/22/2022 2:05 AM by Dr. Herlinda Pierson M.D.       CT Angiogram Neck [[id number]] Collected: 08/22/22 0105     Updated: 08/22/22 0208    Narrative:      NONCONTRAST CRANIAL CT SCAN, CT ANGIOGRAM NECK, CT ANGIOGRAM HEAD.     HISTORY: Dizziness.     COMPARISON: 04/28/2022.     TECHNIQUE:  Radiation dose reduction techniques were utilized, including  automated exposure control and exposure modulation based on body size.   Initially, a routine noncontrast cranial CT performed from the skull  base through the vertex region. After review, thin-section contrast  enhanced CT angiogram images obtained from the aortic arch through the  calvarial vertex utilizing angiographic technique. Multi projection 3-D  MIP reformatted images were supplemented and reviewed.        FINDINGS CRANIAL CT: No acute hemorrhage or midline shift is  demonstrated..  There is diffuse atrophy. There is periventricular and  deep white matter microangiopathic change. There is no midline shift or  mass effect..  Postcontrast imaging does not show any evidence of venous  occlusion or abnormal enhancement.   Bone window images demonstrate some  mucosal thickening noted within the ethmoid sinuses, as well as mucous  retention cyst within the right maxillary sinus.     CERVICAL CAROTID CT ANGIOGRAM:     FINDINGS: The patient has an aberrant right subclavian artery. There is  atherosclerotic involvement of the aortic arch. There is a common origin  of the brachiocephalic artery and left common carotid artery. Right  common carotid artery is widely patent. There is some mild plaque at the  right carotid bifurcation, without any hemodynamically significant  stenosis. Cervical right internal carotid artery is widely  patent. Left  common carotid artery is also widely patent. Left internal carotid  artery is unremarkable within its cervical portion. Right vertebral  artery is unremarkable throughout its course. There is potentially a  focal intimal flap versus calcified plaque involving the left vertebral  artery at the level of C4. It is best seen on image 8 of the axial  series. There is also an area of narrowing within the left vertebral  artery the level of C5, in the range of about 30%. The remainder of the  cervical left vertebral artery is unremarkable. Images through the lung  apices demonstrate some areas of pleural thickening, particularly on the  right. There are scattered nodules noted within the thyroid gland.     NASCET criteria utilized in stenosis measurements. Stenosis mild, 0-49%.        CRANIAL CTA ANGIOGRAM:     FINDINGS:  The intracranial internal carotid arteries are widely patent.  There is an anterior communicating artery. Anterior cerebral arteries  are unremarkable. Left A1 is small in caliber. Middle cerebral arteries  are patent bilaterally.  Intracranial vertebral arteries are  unremarkable, as is the basilar artery. There is a posterior  communicating artery on the left. Posterior cerebral arteries appear  normal..             AI analysis of LVO was utilized.     Radiation dose reduction techniques were utilized, including automated  exposure control and exposure modulation based on body size.          Impression:      1. No acute intracranial findings.  2. Suggestion of a tiny intimal flap within the left vertebral artery at  the level of C4 on the axial images only. It may represent area of  eccentric calcified plaque, but small focal dissection is not excluded.  It is best seen on image 8 of the axial series. There is a further mild  focal narrowing of the left vertebral artery at the level of C2, in the  range of 30%.  3. Remainder of the cervical vasculature is widely patent.  4. Intracranial  vessels are widely patent.     Preliminary report was called to Dr. Ramos at 1:03 AM.        Radiation dose reduction techniques were utilized, including automated  exposure control and exposure modulation based on body size.     This report was finalized on 8/22/2022 2:05 AM by Dr. Herlinda Pierson M.D.                 ECG 12 Lead   Preliminary Result   HEART RATE= 73  bpm   RR Interval= 822  ms   FL Interval= 171  ms   P Horizontal Axis= -67  deg   P Front Axis= -30  deg   QRSD Interval= 89  ms   QT Interval= 385  ms   QRS Axis= -9  deg   T Wave Axis= 54  deg   - NORMAL ECG -   Sinus rhythm   Electronically Signed By:    Date and Time of Study: 2022-08-21 23:15:27           Assessment/Plan     Active Hospital Problems    Diagnosis  POA   • **Dizzy spells [R42]  Yes   • Essential hypertension [I10]  Yes   • Malignant neoplasm of overlapping sites of right breast in female, estrogen receptor positive (HCC) [C50.811, Z17.0]  Not Applicable     Followed by CBC     • Benzodiazepine dependence (HCC) [F13.20]  Yes   • Acoustic neuroma (HCC) [D33.3]  Yes     Very small.  Followed by ENT       Mrs. Thomas is a 89-year-old female with history of hypertension, benzodiazepine dependency, acoustic neuroma, peripheral neuropathy, GERD who presents to the emergency room with dizziness.     Dizziness  -MRI brain in a.m.  -Orthostatic blood pressures every shift  -Telemetry unit for monitoring   -consult neurology in a.m.  -PT to eval and treat    Hypertension/benzodiazepine dependence  -Continue home medications  -Telemetry unit for monitoring    · I discussed the patient's findings and my recommendations with patient and ED provider.    VTE Prophylaxis - SCDs.  Code Status - Full code.       PENG Yarbrough  [facility]  08/22/22  04:22 EDT

## 2022-08-22 NOTE — PLAN OF CARE
Goal Outcome Evaluation:  Plan of Care Reviewed With: patient, daughter           Outcome Evaluation: Pt is a 89 y.o female admitted with c/o of onset of dizziness. She has PMH of HTN, acustic neuroma, peripheral neuropathy. She denies any falls at home, she lives alone and independent with her ADLs. She uses a rwx and has bathroom DME. Orthostatics taken today. Supine BP: 142/62 HR: 82, Standing 114/63 , standing after 3 minutes 112/65 and . She denies any dizziness. She does c/o of some generalized weakness since admission and feels she is not at her baseline. She may benefit from continued OT while admitted to maximize strength and independence in prep to dc home. Encouraged pt to continue to get up with nsg staff for mobility to bathroom and up to chair.    OT wore appropriate PPE and complete hand hygiene.

## 2022-08-22 NOTE — ED NOTES
Nursing report ED to floor  Charo Thomas  89 y.o.  female    HPI :   Chief Complaint   Patient presents with   • Dizziness       Admitting doctor:   Aj Cruz MD    Admitting diagnosis:   The primary encounter diagnosis was Dizzy spells. Diagnoses of Near syncope and Abnormal CT of the head were also pertinent to this visit.    Code status:   Current Code Status     Date Active Code Status Order ID Comments User Context       Not on file    Advance Care Planning Activity          Allergies:   Cortisone, Diphenhydramine hcl (sleep), Epinephrine, Penicillins, and Sulfa antibiotics    Intake and Output  No intake or output data in the 24 hours ending 08/22/22 0147    Weight:   There were no vitals filed for this visit.    Most recent vitals:   Vitals:    08/22/22 0001 08/22/22 0051 08/22/22 0113 08/22/22 0132   BP: 130/67   140/64   Pulse: 68 66 70 69   Resp:       Temp:       SpO2: 99% 99% 98% 98%       Active LDAs/IV Access:   Lines, Drains & Airways     Active LDAs     Name Placement date Placement time Site Days    Peripheral IV 08/21/22 Left Antecubital 08/21/22  --  Antecubital  1                Labs (abnormal labs have a star):   Labs Reviewed   COMPREHENSIVE METABOLIC PANEL - Abnormal; Notable for the following components:       Result Value    Glucose 108 (*)     Creatinine 1.03 (*)     eGFR 50.8 (*)     All other components within normal limits    Narrative:     GFR Normal >60  Chronic Kidney Disease <60  Kidney Failure <15     PROTIME-INR - Normal   APTT - Normal   TROPONIN (IN-HOUSE) - Normal    Narrative:     Troponin T Reference Range:  <= 0.03 ng/mL-   Negative for AMI  >0.03 ng/mL-     Abnormal for myocardial necrosis.  Clinicians would have to utilize clinical acumen, EKG, Troponin and serial changes to determine if it is an Acute Myocardial Infarction or myocardial injury due to an underlying chronic condition.       Results may be falsely decreased if patient taking Biotin.     CBC WITH AUTO  DIFFERENTIAL - Normal   COVID PRE-OP / PRE-PROCEDURE SCREENING ORDER (NO ISOLATION)    Narrative:     The following orders were created for panel order COVID PRE-OP / PRE-PROCEDURE SCREENING ORDER (NO ISOLATION) - Swab, Nasopharynx.  Procedure                               Abnormality         Status                     ---------                               -----------         ------                     COVID-19, SEFERINO IN-HOUSE...[[id number]]                      In process                   Please view results for these tests on the individual orders.   COVID-19, SEFERINO IN-HOUSE CEPHEID/LESIA, NP SWAB IN TRANSPORT MEDIA 8-12 HR TAT   URINALYSIS W/ MICROSCOPIC IF INDICATED (NO CULTURE)   CBC AND DIFFERENTIAL    Narrative:     The following orders were created for panel order CBC & Differential.  Procedure                               Abnormality         Status                     ---------                               -----------         ------                     CBC Auto Differential[[id number]]        Normal              Final result                 Please view results for these tests on the individual orders.       EKG:   ECG 12 Lead   Preliminary Result   HEART RATE= 73  bpm   RR Interval= 822  ms   NV Interval= 171  ms   P Horizontal Axis= -67  deg   P Front Axis= -30  deg   QRSD Interval= 89  ms   QT Interval= 385  ms   QRS Axis= -9  deg   T Wave Axis= 54  deg   - NORMAL ECG -   Sinus rhythm   Electronically Signed By:    Date and Time of Study: 2022-08-21 23:15:27          Meds given in ED:   Medications   sodium chloride 0.9 % flush 10 mL (has no administration in time range)   iopamidol (ISOVUE-370) 76 % injection 100 mL (95 mL Intravenous Given 8/22/22 0031)       Imaging results:  No radiology results for the last day    Ambulatory status:   - assist    Social issues:   Social History     Socioeconomic History   • Marital status:      Spouse name: Marvin   • Number of children: 1   • Years of  education: High School   Tobacco Use   • Smoking status: Never Smoker   • Smokeless tobacco: Never Used   Vaping Use   • Vaping Use: Never used   Substance and Sexual Activity   • Alcohol use: No   • Drug use: No   • Sexual activity: Never       NIH Stroke Scale:        Nursing report ED to floor:

## 2022-08-22 NOTE — THERAPY EVALUATION
Patient Name: Charo Thomas  : 4/3/1929    MRN: [id number]                              Today's Date: 2022       Admit Date: 2022    Visit Dx:     ICD-10-CM ICD-9-CM   1. Dizzy spells  R42 780.4   2. Near syncope  R55 780.2   3. Abnormal CT of the head  R93.0 793.0     Patient Active Problem List   Diagnosis   • Anxiety   • Benzodiazepine dependence (HCC)   • Generalized anxiety disorder   • Gastroesophageal reflux disease   • Essential hypertension   • Peripheral neuropathy   • Acoustic neuroma (HCC)   • Malignant neoplasm of overlapping sites of right breast in female, estrogen receptor positive (HCC)   • Vitamin B12 deficiency   • Radial artery aneurysm, left (HCC)   • Primary osteoarthritis of left knee   • Dizzy spells     Past Medical History:   Diagnosis Date   • Benign esophageal stricture    • Breast cancer (HCC) 2010    Right breast w/papillary features, primary tumor size 1.9 cm w/negative margins   • Breast cancer in male, right 2013    Right breast, stage I, invasive   • Diverticular disease of colon    • H. pylori infection    • H/O Migraines    • Hyperlipidemia    • Hyperplastic polyps of stomach    • Hypertension    • Knee swelling    • Osteoporosis    • Psychiatric disorder    • Vitamin B12 deficiency      Past Surgical History:   Procedure Laterality Date   • APPENDECTOMY     • BREAST BIOPSY Right    • BREAST LUMPECTOMY     • BREAST LUMPECTOMY WITH SENTINEL NODE BIOPSY Right    • COLONOSCOPY      Diverticular disease   • DILATATION AND CURETTAGE  , 1971    x2      General Information     Row Name 22 0953          Physical Therapy Time and Intention    Document Type evaluation (P)   -JF     Mode of Treatment co-treatment;physical therapy;occupational therapy (P)   -JF     Row Name 22 0953          General Information    Patient Profile Reviewed yes (P)   -JF     Prior Level of Function independent:;all household mobility (P)   -JF     Existing  Precautions/Restrictions no known precautions/restrictions (P)   -     Barriers to Rehab none identified (P)   -     Row Name 08/22/22 0953          Living Environment    People in Home alone (P)   -     Row Name 08/22/22 0953          Home Main Entrance    Number of Stairs, Main Entrance none (P)   -[facility] Name 08/22/22 0953          Stairs Within Home, Primary    Number of Stairs, Within Home, Primary none (P)   -[facility] Name 08/22/22 0953          Cognition    Orientation Status (Cognition) oriented x 4 (P)   -[facility] Name 08/22/22 0953          Safety Issues, Functional Mobility    Impairments Affecting Function (Mobility) balance;endurance/activity tolerance;pain;postural/trunk control;strength (P)   -     Comment, Safety Issues/Impairments (Mobility) pain in L hip and knee (P)   -           User Key  (r) = Recorded By, (t) = Taken By, (c) = Cosigned By    Initials Name Provider Type    Aldo Santizo, PT Student PT Student               Mobility     Row Name 08/22/22 0955          Bed Mobility    Bed Mobility supine-sit (P)   -     Supine-Sit [place] (Bed Mobility) independent;supervision (P)   -     Assistive Device (Bed Mobility) head of bed elevated (P)   -[facility] Name 08/22/22 0955          Sit-Stand Transfer    Sit-Stand [place] (Transfers) contact guard;verbal cues (P)   -     Assistive Device (Sit-Stand Transfers) walker, front-wheeled (P)   -[facility] Name 08/22/22 0955          Gait/Stairs (Locomotion)    [place] Level (Gait) contact guard (P)   -     Assistive Device (Gait) walker, front-wheeled (P)   -     Distance in Feet (Gait) 50' (P)   -JF     Deviations/Abnormal Patterns (Gait) stride length decreased;viki decreased;left sided deviations (P)   -JF     Bilateral Gait Deviations forward flexed posture (P)   -     Comment, (Gait/Stairs) Pt has decreased stance time on L LE likely due to pain (P)   -           User Key  (r) = Recorded By,  (t) = Taken By, (c) = Cosigned By    Initials Name Provider Type    Aldo Santizo, PT Student PT Student               Obj/Interventions     Row Name 08/22/22 1000          Range of Motion Comprehensive    General Range of Motion no range of motion deficits identified (P)   -JF     Row Name 08/22/22 1000          Strength Comprehensive (MMT)    General Manual Muscle Testing (MMT) Assessment lower extremity strength deficits identified (P)   -     Comment, General Manual Muscle Testing (MMT) Assessment R knee ext/flex 5/5, R DF 5/5, R hip flex 4/5. L knee ext 4/5, L knee flex 3/5, L hip flex 3/5, L DF 4/5. L LE testing limited by pain (P)   -JF     Row Name 08/22/22 1000          Balance    Balance Assessment sitting static balance;sitting dynamic balance;standing static balance;standing dynamic balance (P)   -     Static Sitting Balance supervision;independent (P)   -     Dynamic Sitting Balance standby assist (P)   -     Position, Sitting Balance unsupported;sitting edge of bed (P)   -     Static Standing Balance contact guard (P)   -     Dynamic Standing Balance contact guard (P)   -     Position/Device Used, Standing Balance supported;walker, front-wheeled (P)   -JF     Row Name 08/22/22 1000          Sensory Assessment (Somatosensory)    Sensory Assessment (Somatosensory) not tested (P)   -           User Key  (r) = Recorded By, (t) = Taken By, (c) = Cosigned By    Initials Name Provider Type    Aldo Santizo, PT Student PT Student               Goals/Plan     Row Name 08/22/22 1018          Transfer Goal 1 (PT)    Activity/Assistive Device (Transfer Goal 1, PT) sit-to-stand/stand-to-sit;walker, rolling (P)   -     [place] Level/Cues Needed (Transfer Goal 1, PT) standby assist (P)   -     Time Frame (Transfer Goal 1, PT) long term goal (LTG);1 week (P)   -     Progress/Outcome (Transfer Goal 1, PT) goal ongoing (P)   -JF     Row Name 08/22/22 1018          Gait Training  Goal 1 (PT)    Activity/Assistive Device (Gait Training Goal 1, PT) gait (walking locomotion);walker, rolling (P)   -     [place] Level (Gait Training Goal 1, PT) standby assist (P)   -     Distance (Gait Training Goal 1, PT) 60' (P)   -JF     Time Frame (Gait Training Goal 1, PT) long term goal (LTG);1 week (P)   -     Progress/Outcome (Gait Training Goal 1, PT) goal ongoing (P)   -     Row Name 08/22/22 1018          Therapy Assessment/Plan (PT)    Planned Therapy Interventions (PT) balance training;gait training;patient/family education (P)   -           User Key  (r) = Recorded By, (t) = Taken By, (c) = Cosigned By    Initials Name Provider Type    Aldo Santizo, PT Student PT Student               Clinical Impression     Row Name 08/22/22 1004          Pain    Pain Intervention(s) Repositioned;Ambulation/increased activity (P)   -     Additional Documentation Pain Scale: FACES Pre/Post-Treatment (Group) (P)   -     Row Name 08/22/22 1004          Pain Scale: FACES Pre/Post-Treatment    Pain: FACES Scale, Pretreatment 2-->hurts little bit (P)   -     Posttreatment Pain Rating 4-->hurts little more (P)   -     Pain Location - hip;knee (P)   -     Pre/Posttreatment Pain Comment L hip/knee (P)   -     Row Name 08/22/22 1004          Plan of Care Review    Plan of Care Reviewed With patient;daughter (P)   -     Outcome Evaluation Pt is a 89 yo female presenting to the ED w dizziness. PMH of HTN and peripheral neuropathy. Prior to admission, pt was living alone and was indpendent in household mobilty using rwx. Upon exam, pt was independent in bed mobility and able to stand CGA to a rwx. Pt was positive for orthostatics but with mild symptoms, was able to walk 50' CGA. Reported L leg pain with movement, decreased stance time on L LE was noted. Pt had some weakness due to pain on R side for MMT, reports pain and popping in hip and knee with knee flex/ext, hip flex, and ankle DF. Pt  does have history of OA in L hip. Pt will benefit from PT to address impairments. Rec d/c to home w  PT pending progress. (P)   -     Row Name 08/22/22 1004          Therapy Assessment/Plan (PT)    Rehab Potential (PT) good, to achieve stated therapy goals (P)   -     Criteria for Skilled Interventions Met (PT) yes (P)   -     Therapy Frequency (PT) 5 times/wk (P)   -     Row Name 08/22/22 1004          Vital Signs    Pre Systolic BP Rehab 142 (P)   -JF     Pre Treatment Diastolic BP 62 (P)   -JF     Intra Systolic BP Rehab 114 (P)   -     Intra Treatment Diastolic BP 63 (P)   -     Post Systolic BP Rehab 112 (P)   -     Post Treatment Diastolic BP 63 (P)   -     Pretreatment Heart Rate (beats/min) 82 (P)   -     Intratreatment Heart Rate (beats/min) 103 (P)   -     Posttreatment Heart Rate (beats/min) 107 (P)   -     Pre SpO2 (%) 99 (P)   -     O2 Delivery Pre Treatment room air (P)   -     Pre Patient Position Supine (P)   -     Intra Patient Position Standing (P)   -     Post Patient Position Standing (P)   -     Row Name 08/22/22 1004          Positioning and Restraints    Pre-Treatment Position in bed (P)   -     Post Treatment Position chair (P)   -     In Chair notified nsg;reclined;sitting;call light within reach;encouraged to call for assist;exit alarm on;with family/caregiver (P)   -           User Key  (r) = Recorded By, (t) = Taken By, (c) = Cosigned By    Initials Name Provider Type    Aldo Santizo, PT Student PT Student               Outcome Measures     Row Name 08/22/22 1021          How much help from another person do you currently need...    Turning from your back to your side while in flat bed without using bedrails? 4 (P)   -JF     Moving from lying on back to sitting on the side of a flat bed without bedrails? 4 (P)   -JF     Moving to and from a bed to a chair (including a wheelchair)? 3 (P)   -JF     Standing up from a chair using your arms  (e.g., wheelchair, bedside chair)? 3 (P)   -JF     Climbing 3-5 steps with a railing? 3 (P)   -JF     To walk in hospital room? 3 (P)   -JF     AM-PAC 6 Clicks Score (PT) 20 (P)   -     Highest level of mobility 6 --> Walked 10 steps or more (P)   -     Row Name 08/22/22 1021          Functional Assessment    Outcome Measure Options AM-PAC 6 Clicks Basic Mobility (PT) (P)   -           User Key  (r) = Recorded By, (t) = Taken By, (c) = Cosigned By    Initials Name Provider Type    Aldo Santizo, PT Student PT Student                             Physical Therapy Education                 Title: PT OT SLP Therapies (In Progress)     Topic: Physical Therapy (In Progress)     Point: Mobility training (Done)     Learning Progress Summary           Patient Acceptance, E, VU,NR by  at 8/22/2022 1022                   Point: Home exercise program (Not Started)     Learner Progress:  Not documented in this visit.          Point: Body mechanics (Done)     Learning Progress Summary           Patient Acceptance, E, VU,NR by  at 8/22/2022 1022                   Point: Precautions (Not Started)     Learner Progress:  Not documented in this visit.                      User Key     Initials Effective Dates Name Provider Type Discipline     07/28/22 -  Aldo Marks, PT Student PT Student PT              PT Recommendation and Plan  Planned Therapy Interventions (PT): (P) balance training, gait training, patient/family education  Plan of Care Reviewed With: (P) patient, daughter  Outcome Evaluation: (P) Pt is a 89 yo female presenting to the ED w dizziness. PMH of HTN and peripheral neuropathy. Prior to admission, pt was living alone and was indpendent in household mobilty using rwx. Upon exam, pt was independent in bed mobility and able to stand CGA to a rwx. Pt was positive for orthostatics but with mild symptoms, was able to walk 50' CGA. Reported L leg pain with movement, decreased stance time on L LE was  noted. Pt had some weakness due to pain on R side for MMT, reports pain and popping in hip and knee with knee flex/ext, hip flex, and ankle DF. 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 Pt will benefit from PT to address impairments. Rec d/c to home w HH PT pending progress.     Time Calculation:    PT Charges     Row Name 08/22/22 1028 08/22/22 1022          Time Calculation    Start Time -- 0855 (P)   -     Stop Time -- 0917 (P)   -     Time Calculation (min) -- 22 min (P)   -JF     PT Received On 08/22/22 (P)   -LINDA --     PT - Next Appointment 08/23/22 (P)   -LINDA --     PT Goal Re-Cert Due Date 08/29/22 (P)   -LINDA --           User Key  (r) = Recorded By, (t) = Taken By, (c) = Cosigned By    Initials Name Provider Type    Aldo Santizo, PT Student PT Student              Therapy Charges for Today     Code Description Service Date Service Provider Modifiers Qty    [id number]  PT EVAL MOD COMPLEXITY 2 8/22/2022 Aldo Marks, PT Student GP 1          PT G-Codes  Outcome Measure Options: (P) AM-PAC 6 Clicks Basic Mobility (PT)  AM-PAC 6 Clicks Score (PT): (P) 20    Aldo Marks PT Student  8/22/2022

## 2022-08-22 NOTE — PLAN OF CARE
Problem: Adult Inpatient Plan of Care  Goal: Plan of Care Review  Outcome: Ongoing, Progressing  Flowsheets (Taken 8/22/2022 0531)  Plan of Care Reviewed With: patient  Outcome Evaluation: Pt arrived to floor approx 0230, SCDs on, MRI paperwork completed and faxed. Accu-max pump in place. Neurology consult placed.  Goal: Patient-Specific Goal (Individualized)  Outcome: Ongoing, Progressing  Goal: Absence of Hospital-Acquired Illness or Injury  Outcome: Ongoing, Progressing  Intervention: Identify and Manage Fall Risk  Recent Flowsheet Documentation  Taken 8/22/2022 0405 by Nilda Urrutia RN  Safety Promotion/Fall Prevention: safety round/check completed  Taken 8/22/2022 0300 by Nilda Urrutia RN  Safety Promotion/Fall Prevention: safety round/check completed  Intervention: Prevent Skin Injury  Recent Flowsheet Documentation  Taken 8/22/2022 0300 by Nilda Urrutia RN  Skin Protection:   adhesive use limited   incontinence pads utilized   tubing/devices free from skin contact  Intervention: Prevent and Manage VTE (Venous Thromboembolism) Risk  Recent Flowsheet Documentation  Taken 8/22/2022 0300 by Nilda Urrutia RN  VTE Prevention/Management: bilateral  Goal: Optimal Comfort and Wellbeing  Outcome: Ongoing, Progressing  Intervention: Provide Person-Centered Care  Recent Flowsheet Documentation  Taken 8/22/2022 0300 by Nilda Urrutia RN  Trust Relationship/Rapport:   care explained   choices provided  Goal: Readiness for Transition of Care  Outcome: Ongoing, Progressing  Intervention: Mutually Develop Transition Plan  Recent Flowsheet Documentation  Taken 8/22/2022 0246 by Nilda Urrutia RN  Transportation Anticipated: family or friend will provide  Patient/Family Anticipated Services at Transition: none  Patient/Family Anticipates Transition to: home  Taken 8/22/2022 0241 by Nilda Urrutia RN  Equipment Currently Used at Home: walker, rolling     Problem: Hypertension Comorbidity  Goal: Blood Pressure in Desired Range  Outcome:  Ongoing, Progressing  Intervention: Maintain Blood Pressure Management  Recent Flowsheet Documentation  Taken 8/22/2022 0300 by Nilda Urrutia, RN  Medication Review/Management: (home meds) medications reviewed     Problem: Skin Injury Risk Increased  Goal: Skin Health and Integrity  Outcome: Ongoing, Progressing  Intervention: Optimize Skin Protection  Recent Flowsheet Documentation  Taken 8/22/2022 0300 by Nilda Urrutia, RN  Pressure Reduction Techniques:   frequent weight shift encouraged   weight shift assistance provided  Pressure Reduction Devices: alternating pressure pump (ADD)  Skin Protection:   adhesive use limited   incontinence pads utilized   tubing/devices free from skin contact   Goal Outcome Evaluation:  Plan of Care Reviewed With: patient           Outcome Evaluation: Pt arrived to floor approx 0230, SCDs on, MRI paperwork completed and faxed. Accu-max pump in place. Neurology consult placed.

## 2022-08-22 NOTE — PROGRESS NOTES
Discharge Planning Assessment  [facility]     Patient Name: Charo Thomas  MRN: [id number]  Today's Date: 8/22/2022    Admit Date: 8/21/2022     Discharge Needs Assessment     Row Name 08/22/22 1559       Living Environment    People in Home alone    Current Living Arrangements condominium    Primary Care Provided by self    Provides Primary Care For no one    Family Caregiver if Needed child(saad), adult    Family Caregiver Names [facility] on file/daughter, Monet French, [phone]    Quality of Family Relationships helpful;involved;supportive    Able to Return to Prior Arrangements yes       Resource/Environmental Concerns    Resource/Environmental Concerns none       Transition Planning    Patient/Family Anticipates Transition to home with help/services    Patient/Family Anticipated Services at Transition home health care    Transportation Anticipated family or friend will provide       Discharge Needs Assessment    Equipment Currently Used at Home walker, rolling;bath bench;grab bar    Concerns to be Addressed discharge planning    Outpatient/Agency/Support Group Needs homecare agency    Discharge Facility/Level of Care Needs home with home health    Provided Post Acute Provider List? Yes    Post Acute Provider List Home Health    Discharge Coordination/Progress HH               Discharge Plan     Row Name 08/22/22 6733       Plan    Plan Home with HH, awaiting agency preference    Patient/Family in Agreement with Plan yes    Plan Comments CCP met with pt and daughter at bedside to discuss d/c planning. Pt resides alone in a ground floor condo with no steps, and uses walker, bath bench, and grab bars at home. Pt has used [facility] in home PT in the past and has no SNF history. Pt agreeable to HH referral and daughter to obtain preferred agency name from friend. CCP to follow to make HH referral. Mora Franklin LCSW              Continued Care and Services - Admitted Since 8/21/2022    Coordination has not been  started for this encounter.       Expected Discharge Date and Time     Expected Discharge Date Expected Discharge Time    Aug 24, 2022          Demographic Summary     Row Name 08/22/22 1551       General Information    Admission Type inpatient    Arrived From home    Required Notices Provided Important Message from Medicare    Referral Source admission list    Reason for Consult discharge planning    Preferred Language English               Functional Status     Row Name 08/22/22 1551       Functional Status    Usual Activity Tolerance good    Current Activity Tolerance moderate       Functional Status, IADL    Medications assistive equipment    Meal Preparation assistive equipment    Housekeeping assistive equipment    Laundry assistive equipment    Shopping assistive equipment       Mental Status Summary    Recent Changes in Mental Status/Cognitive Functioning no changes               Psychosocial    No documentation.                Abuse/Neglect    No documentation.                Legal    No documentation.                Substance Abuse    No documentation.                Patient Forms    No documentation.                   Sahara Franklin LCSW

## 2022-08-22 NOTE — THERAPY EVALUATION
Patient Name: Charo Thomas  : 4/3/1929    MRN: [id number]                              Today's Date: 2022       Admit Date: 2022    Visit Dx:     ICD-10-CM ICD-9-CM   1. Dizzy spells  R42 780.4   2. Near syncope  R55 780.2   3. Abnormal CT of the head  R93.0 793.0     Patient Active Problem List   Diagnosis   • Anxiety   • Benzodiazepine dependence (HCC)   • Generalized anxiety disorder   • Gastroesophageal reflux disease   • Essential hypertension   • Peripheral neuropathy   • Acoustic neuroma (HCC)   • Malignant neoplasm of overlapping sites of right breast in female, estrogen receptor positive (HCC)   • Vitamin B12 deficiency   • Radial artery aneurysm, left (HCC)   • Primary osteoarthritis of left knee   • Dizzy spells     Past Medical History:   Diagnosis Date   • Benign esophageal stricture    • Breast cancer (HCC) 2010    Right breast w/papillary features, primary tumor size 1.9 cm w/negative margins   • Breast cancer in male, right 2013    Right breast, stage I, invasive   • Diverticular disease of colon    • H. pylori infection    • H/O Migraines    • Hyperlipidemia    • Hyperplastic polyps of stomach    • Hypertension    • Knee swelling    • Osteoporosis    • Psychiatric disorder    • Vitamin B12 deficiency      Past Surgical History:   Procedure Laterality Date   • APPENDECTOMY     • BREAST BIOPSY Right    • BREAST LUMPECTOMY     • BREAST LUMPECTOMY WITH SENTINEL NODE BIOPSY Right    • COLONOSCOPY      Diverticular disease   • DILATATION AND CURETTAGE  , 1971    x2      General Information     Row Name 22 1051          OT Time and Intention    Document Type evaluation  -SM     Mode of Treatment co-treatment;physical therapy;occupational therapy  -     Row Name 22 1051          General Information    Patient Profile Reviewed yes  -SM     Prior Level of Function independent:;ADL's;all household mobility  daughter comes over while pt baths  -SM      "Existing Precautions/Restrictions fall  -[facility] Name 08/22/22 1051          Occupational Profile    Environmental Supports and Barriers (Occupational Profile) Home DME includes grab bars, shower chair, rwx  -[facility] Name 08/22/22 1051          Living Environment    People in Home alone  -[facility] Name 08/22/22 1051          Home Main Entrance    Number of Stairs, Main Entrance none  -SM     Row Name 08/22/22 1051          Stairs Within Home, Primary    Number of Stairs, Within Home, Primary none  -[facility] Name 08/22/22 1051          Cognition    Orientation Status (Cognition) oriented x 4;verbal cues/prompts needed for orientation  -[facility] Name 08/22/22 1051          Safety Issues, Functional Mobility    Impairments Affecting Function (Mobility) balance;endurance/activity tolerance;strength;pain  -           User Key  (r) = Recorded By, (t) = Taken By, (c) = Cosigned By    Initials Name Provider Type     Lindsey Reynolds OT Occupational Therapist                 Mobility/ADL's     [facility] Name 08/22/22 1052          Bed Mobility    Supine-Sit [place] (Bed Mobility) standby assist  extra time  -     Assistive Device (Bed Mobility) head of bed elevated  -[facility] Name 08/22/22 1052          Transfers    Sit-Stand [place] (Transfers) contact guard;verbal cues  -SM     Row Name 08/22/22 1052          Sit-Stand Transfer    Assistive Device (Sit-Stand Transfers) walker, front-wheeled  -[facility] Name 08/22/22 1052          Functional Mobility    Functional Mobility- Ind. Level contact guard assist  -     Functional Mobility- Device walker, front-wheeled  -     Functional Mobility- Comment Pt declines completing ADLs in bathroom this date but does demonstrate ability to complete distance needed, she has a slight limp which she reports is her baseline due to \"bad hip\". Extra time required and slow pace  -[facility] Name 08/22/22 1052          Activities of Daily Living    BADL " Assessment/Intervention lower body dressing;toileting  -[facility] Name 08/22/22 1052          Lower Body Dressing Assessment/Training    [place] Level (Lower Body Dressing) lower body dressing skills;doff;don;socks;shoes/slippers;moderate assist (50% patient effort)  -     Position (Lower Body Dressing) edge of bed sitting;long sitting  -     Comment, (Lower Body Dressing) pt attempted to don socks but having difficulty. Daughter steps in to assist her per daughters request.  -SM     Row Name 08/22/22 1052          Toileting Assessment/Training    Comment, (Toileting) hx of incontinence and wears pads  -           User Key  (r) = Recorded By, (t) = Taken By, (c) = Cosigned By    Initials Name Provider Type     Lindsey Reynolds OT Occupational Therapist               Obj/Interventions     Row Name 08/22/22 1054          Sensory Assessment (Somatosensory)    Sensory Assessment hx of peripheral neuropathy in hands and feet  -SM     Row Name 08/22/22 1054          Vision Assessment/Intervention    Visual Impairment/Limitations corrective lenses full-time  -     Vision Assessment Comment no new changes per pt  -SM     Row Name 08/22/22 1054          Range of Motion Comprehensive    General Range of Motion bilateral upper extremity ROM WFL  -SM     Row Name 08/22/22 1054          Strength Comprehensive (MMT)    Comment, General Manual Muscle Testing (MMT) Assessment BUE 4/5  -SM     Row Name 08/22/22 1054          Motor Skills    Motor Skills coordination;functional endurance  -     Coordination upper extremity;bilateral;WFL  -     Functional Endurance fair  -SM     Row Name 08/22/22 1054          Balance    Static Sitting Balance independent  -     Position, Sitting Balance sitting edge of bed  -     Static Standing Balance contact guard  -     Dynamic Standing Balance contact guard  -     Position/Device Used, Standing Balance supported;walker, rolling  -           User Key  (r) =  Recorded By, (t) = Taken By, (c) = Cosigned By    Initials Name Provider Type    SM Lindsey Reynolds OT Occupational Therapist               Goals/Plan     Row Name 08/22/22 1103          Transfer Goal 1 (OT)    Activity/Assistive Device (Transfer Goal 1, OT) transfers, all;toilet  -     [place] Level/Cues Needed (Transfer Goal 1, OT) modified independence  -SM     Time Frame (Transfer Goal 1, OT) short term goal (STG);2 weeks  -SM     Progress/Outcome (Transfer Goal 1, OT) goal ongoing  -[facility] Name 08/22/22 1103          Dressing Goal 1 (OT)    Activity/Device (Dressing Goal 1, OT) dressing skills, all  -SM     [place]/Cues Needed (Dressing Goal 1, OT) modified independence  -SM     Time Frame (Dressing Goal 1, OT) short term goal (STG);2 weeks  -SM     Progress/Outcome (Dressing Goal 1, OT) goal ongoing  -SM     Row Name 08/22/22 1103          Toileting Goal 1 (OT)    Activity/Device (Toileting Goal 1, OT) toileting skills, all  -SM     [place] Level/Cues Needed (Toileting Goal 1, OT) modified independence  -SM     Time Frame (Toileting Goal 1, OT) short term goal (STG);2 weeks  -SM     Progress/Outcome (Toileting Goal 1, OT) goal ongoing  -[facility] Name 08/22/22 1103          Therapy Assessment/Plan (OT)    Planned Therapy Interventions (OT) activity tolerance training;BADL retraining;ROM/therapeutic exercise;occupation/activity based interventions;patient/caregiver education/training;transfer/mobility retraining  -           User Key  (r) = Recorded By, (t) = Taken By, (c) = Cosigned By    Initials Name Provider Type    Lindsey Campso OT Occupational Therapist               Clinical Impression     Row Name 08/22/22 1056          Pain Assessment    Pre/Posttreatment Pain Comment c/o of L hip pain, chronic, unrated by pt  -     Pain Intervention(s) Ambulation/increased activity;Repositioned  -[facility] Name 08/22/22 1056          Pain Scale: FACES Pre/Post-Treatment    Pain:  FACES Scale, Pretreatment 2-->hurts little bit  -     Posttreatment Pain Rating 2-->hurts little bit  -     Row Name 08/22/22 1056          Plan of Care Review    Plan of Care Reviewed With patient;daughter  -     Outcome Evaluation Pt is a 89 y.o female admitted with c/o of onset of dizziness. She has PMH of HTN, acustic neuroma, peripheral neuropathy. She denies any falls at home, she lives alone and independent with her ADLs. She uses a rwx and has bathroom DME. Orthostatics taken today. Supine BP: 142/62 HR: 82, Standing 114/63 , standing after 3 minutes 112/65 and . She denies any dizziness. She does c/o of some generalized weakness since admission and feels she is not at her baseline. She may benefit from continued OT while admitted to maximize strength and independence in prep to [place] home. Encouraged pt to continue to get up with [facility] staff for mobility to bathroom and up to chair.  -     Row Name 08/22/22 1056          Therapy Assessment/Plan (OT)    Rehab Potential (OT) good, to achieve stated therapy goals  -     Criteria for Skilled Therapeutic Interventions Met (OT) yes;skilled treatment is necessary  -     Therapy Frequency (OT) 3 times/wk  -     Row Name 08/22/22 1056          Therapy Plan Review/Discharge Plan (OT)    Anticipated Discharge Disposition (OT) home  -     Row Name 08/22/22 1056          Vital Signs    O2 Delivery Pre Treatment room air  -     O2 Delivery Intra Treatment room air  -     O2 Delivery Post Treatment room air  -     Row Name 08/22/22 1056          Positioning and Restraints    Pre-Treatment Position in bed  -     Post Treatment Position chair  -     In Chair reclined;call light within reach;encouraged to call for assist;exit alarm on;with family/caregiver;notified [facility]  -           User Key  (r) = Recorded By, (t) = Taken By, (c) = Cosigned By    Initials Name Provider Type    Lindsey Campos, OT Occupational Therapist                Outcome Measures     Row Name 08/22/22 1104          How much help from another is currently needed...    Putting on and taking off regular lower body clothing? 2  -SM     Bathing (including washing, rinsing, and drying) 3  -SM     Toileting (which includes using toilet bed pan or urinal) 2  -SM     Putting on and taking off regular upper body clothing 3  -SM     Taking care of personal grooming (such as brushing teeth) 3  -SM     Eating meals 4  -     AM-PAC 6 Clicks Score (OT) 17  -SM     Row Name 08/22/22 1021          How much help from another person do you currently need...    Turning from your back to your side while in flat bed without using bedrails? 4  -EE (r) JF (t) EE (c)     Moving from lying on back to sitting on the side of a flat bed without bedrails? 4  -EE (r) JF (t) EE (c)     Moving to and from a bed to a chair (including a wheelchair)? 3  -EE (r) JF (t) EE (c)     Standing up from a chair using your arms (e.g., wheelchair, bedside chair)? 3  -EE (r) JF (t) EE (c)     Climbing 3-5 steps with a railing? 3  -EE (r) JF (t) EE (c)     To walk in hospital room? 3  -EE (r) JF (t) EE (c)     AM-PAC 6 Clicks Score (PT) 20  -EE (r) JF (t)     Highest level of mobility 6 --> Walked 10 steps or more  -EE (r) JF (t)     Row Name 08/22/22 1104 08/22/22 1021       Functional Assessment    Outcome Measure Options AM-PAC 6 Clicks Daily Activity (OT)  - AM-PAC 6 Clicks Basic Mobility (PT)  -EE (r) JF (t) EE (c)          User Key  (r) = Recorded By, (t) = Taken By, (c) = Cosigned By    Initials Name Provider Type    EE Tatum Quiñones, PT Physical Therapist    Lindsey Campos, OT Occupational Therapist    Aldo Santizo, PT Student PT Student                Occupational Therapy Education                 Title: PT OT SLP Therapies (In Progress)     Topic: Occupational Therapy (In Progress)     Point: ADL training (Done)     Description:   Instruct learner(s) on proper safety adaptation and remediation  techniques during self care or transfers.   Instruct in proper use of assistive devices.              Learning Progress Summary           Patient Acceptance, E, VU by  at 8/22/2022 1105    Comment: OT goals and POC   Family Acceptance, E, VU by  at 8/22/2022 1105    Comment: OT goals and POC                   Point: Home exercise program (Not Started)     Description:   Instruct learner(s) on appropriate technique for monitoring, assisting and/or progressing therapeutic exercises/activities.              Learner Progress:  Not documented in this visit.          Point: Precautions (Not Started)     Description:   Instruct learner(s) on prescribed precautions during self-care and functional transfers.              Learner Progress:  Not documented in this visit.          Point: Body mechanics (Not Started)     Description:   Instruct learner(s) on proper positioning and spine alignment during self-care, functional mobility activities and/or exercises.              Learner Progress:  Not documented in this visit.                      User Key     Initials Effective Dates Name Provider Type Discipline     04/02/20 -  Lindsey Reynolds OT Occupational Therapist OT              OT Recommendation and Plan  Planned Therapy Interventions (OT): activity tolerance training, BADL retraining, ROM/therapeutic exercise, occupation/activity based interventions, patient/caregiver education/training, transfer/mobility retraining  Therapy Frequency (OT): 3 times/wk  Plan of Care Review  Plan of Care Reviewed With: patient, daughter  Outcome Evaluation: Pt is a 89 y.o female admitted with c/o of onset of dizziness. She has PMH of HTN, acustic neuroma, peripheral neuropathy. She denies any falls at home, she lives alone and independent with her ADLs. She uses a rwx and has bathroom DME. Orthostatics taken today. Supine BP: 142/62 HR: 82, Standing 114/63 , standing after 3 minutes 112/65 and . She denies any dizziness.  She does c/o of some generalized weakness since admission and feels she is not at her baseline. She may benefit from continued OT while admitted to maximize strength and independence in prep to [place] home. Encouraged pt to continue to get up with [facility] staff for mobility to bathroom and up to chair.     Time Calculation:    Time Calculation- OT     Row Name 08/22/22 1105             Time Calculation- OT    OT Start Time 1020  -SM      OT Stop Time 1040  -SM      OT Time Calculation (min) 20 min  -SM      Total Timed Code Minutes- OT 12 minute(s)  -SM      OT Received On 08/22/22  -      OT - Next Appointment 08/24/22  -      OT Goal Re-Cert Due Date 09/05/22  -SM              Timed Charges    92326 - OT Therapeutic Activity Minutes 12  -SM              Untimed Charges    OT Eval/Re-eval Minutes 8  -SM              Total Minutes    Timed Charges Total Minutes 12  -SM      Untimed Charges Total Minutes 8  -SM       Total Minutes 20  -SM            User Key  (r) = Recorded By, (t) = Taken By, (c) = Cosigned By    Initials Name Provider Type     Lindsey Reynolds OT Occupational Therapist              Therapy Charges for Today     Code Description Service Date Service Provider Modifiers Qty    [id number]  OT THERAPEUTIC ACT EA 15 MIN 8/22/2022 Lindsey Reynolds OT GO 1    98071173915 HC OT EVAL MOD COMPLEXITY 2 8/22/2022 Lindsey Reynolds OT GO 1               Lindsey Reynolds OT  8/22/2022

## 2022-08-22 NOTE — ED TRIAGE NOTES
Pt arrives to ED via EMS from home with c/o dizziness starting about 21:45 tonight.  Still ongoing, worse when standing up.  Pt states sudden onset and no hx of dizziness.  Pt with no other complaints or pain    Patient was placed in face mask during first look triage.  Patient was wearing a face mask throughout encounter.  I wore personal protective equipment throughout the encounter.  Hand hygiene was performed before and after patient encounter.

## 2022-08-22 NOTE — CONSULTS
Nutrition Services    Patient Name:  Charo Thomas  YOB: 1929  MRN: [id number]  Admit Date:  8/21/2022      Comment: [place]   Pt has stable wt. She has a hx of R sided breasst cancer, h. Pylori, vertigo, HLD, HTN, migraines, B12 deficiency, psychiatric disorder, dry eye syndrome, benzodiazepene dependence, acoustic neuroma, peripheral neuropathy, and GERD. She passed her swallow eval and is able to tolerate a regular diet. She states she does not have an appetite ever due to old age, but she eats 3 meals a day: breakfast- eggs, toast OJ, lunch- peanut butter sandwich, dinner- salad or whatever her daughter brings her. She has been eating minimally due to it being hospital food. RD to liberalize diet to aid po intake. Will follow as needed.       CLINICAL NUTRITION ASSESSMENT      Reason for Assessment Nurse Admission Screen     H&P  Per hospitalist- 89-year-old female with history of hypertension, benzodiazepine dependency, acoustic neuroma, peripheral neuropathy, GERD who presents to the emergency room with dizziness.  Patient describes sensation as lightheadedness, she states this started just hours ago prior to coming to the emergency room, she states she had a similar episode a few weeks ago but it resolved on its own.  She denies any recent fever or illness.  She denies any nausea or vomiting, no chest pain or shortness of breath, no abdominal pain.    Past Medical History:   Diagnosis Date   • Benign esophageal stricture    • Breast cancer (HCC) 06/30/2010    Right breast w/papillary features, primary tumor size 1.9 cm w/negative margins   • Breast cancer in male, right 2013    Right breast, stage I, invasive   • Diverticular disease of colon    • H. pylori infection    • H/O Migraines    • Hyperlipidemia    • Hyperplastic polyps of stomach    • Hypertension    • Knee swelling 1995   • Osteoporosis    • Psychiatric disorder    • Vitamin B12 deficiency        Past Surgical History:   Procedure  "Laterality Date   • APPENDECTOMY  1944   • BREAST BIOPSY Right 2010   • BREAST LUMPECTOMY     • BREAST LUMPECTOMY WITH SENTINEL NODE BIOPSY Right 2013   • COLONOSCOPY      Diverticular disease   • DILATATION AND CURETTAGE  1957, 1971    x2          Nutritional Risk Screening       MST SCORE 0   Risk Screening Criteria No indicators     Encounter Information        Nutrition/Diet History:    Pt has stable wt and no po intakes recorded at this time. She has a hx of R sided breasst cancer, h. Pylori, vertigo, HLD, HTN, migraines, B12 deficiency, psychiatric disorder, dry eye syndrome, benzodiazepene dependence, acoustic neuroma, peripheral neuropathy, and GERD. She passed her swallow eval and is able to tolerate a regular diet. She states she does not have an appetite ever due to old age, but she eats 3 meals a day: breakfast- eggs, toast OJ, lunch- peanut butter sandwich, dinner- salad or whatever her daughter brings her. She has been eating minimally due to it being hospital food. RD to liberalize diet to aid po intake. Will follow as needed.    Food Preferences:      Supplements:      Factors Affecting Intake: decreased appetite, dislikes hospital food     Tests/Procedures: CT scan angiogram        Anthropometrics        Current Height  Current Weight  BMI kg/m2 Height: 157.5 cm (62\")  Weight: 55.8 kg (123 lb 0.3 oz) (08/22/22 0237)  Body mass index is 22.5 kg/m².       Admission Weight 123# (55.8kg)    Ideal Body Weight (IBW) 110# (50.1kg)    Usual Body Weight (UBW)        Trending Weight Hx     Weight Change              PTA: Wt Readings from Last 30 Encounters:   08/22/22 0237 55.8 kg (123 lb 0.3 oz)   04/04/22 1303 57.3 kg (126 lb 6.4 oz)   02/10/22 1603 57.6 kg (127 lb)   02/10/22 1342 55.8 kg (123 lb)   11/04/21 1445 55.8 kg (123 lb)   10/04/21 1054 57.2 kg (126 lb 3.2 oz)   04/02/21 1439 58.9 kg (129 lb 14.4 oz)   03/25/21 1526 59.9 kg (132 lb 1.6 oz)   01/12/21 1208 61.5 kg (135 lb 8 oz)   09/16/19 1104 60.5 " kg (133 lb 6.4 oz)   04/16/19 1528 63.4 kg (139 lb 12.8 oz)   03/14/19 1013 64.1 kg (141 lb 6.4 oz)   08/16/18 1031 65.8 kg (145 lb 1.6 oz)   02/15/18 1053 68 kg (150 lb)   01/02/18 1034 67.9 kg (149 lb 11.2 oz)   06/13/17 1007 68.1 kg (150 lb 1.6 oz)   04/28/17 1517 68.5 kg (151 lb)   02/08/17 1003 65.3 kg (144 lb)   11/29/16 1257 67.9 kg (149 lb 9.6 oz)   10/28/16 0907 67.9 kg (149 lb 11.2 oz)   06/14/16 1332 67.9 kg (149 lb 11.2 oz)   04/27/16 1437 68.1 kg (150 lb 3.2 oz)   09/04/15 0806 66.5 kg (146 lb 11.1 oz)   08/07/15 0942 67 kg (147 lb 11.3 oz)   06/12/15 1348 67.8 kg (149 lb 7.9 oz)            Labs       Pertinent Labs reviewed   Results from last 7 days   Lab Units 08/22/22  0621 08/21/22  2318   SODIUM mmol/L 138 136   POTASSIUM mmol/L 3.8 4.9   CHLORIDE mmol/L 96* 99   CO2 mmol/L 30.4* 26.0   BUN mg/dL 18 22   CREATININE mg/dL 0.94 1.03*   CALCIUM mg/dL 9.7* 9.3   BILIRUBIN mg/dL  --  0.6   ALK PHOS U/L  --  70   ALT (SGPT) U/L  --  8   AST (SGOT) U/L  --  24   GLUCOSE mg/dL 107* 108*     Results from last 7 days   Lab Units 08/22/22  0621   HEMOGLOBIN g/dL 13.4   HEMATOCRIT % 39.6   WBC 10*3/mm3 6.46   TRIGLYCERIDES mg/dL 169*     Results from last 7 days   Lab Units 08/22/22  0621 08/21/22  2318   INR  0.97 0.99   APTT seconds  --  29.5   PLATELETS 10*3/mm3 245 224     COVID19   Date Value Ref Range Status   08/22/2022 Not Detected Not Detected - Ref. Range Final     Lab Results   Component Value Date    HGBA1C 5.50 08/22/2022          Medications           Scheduled Medications [START ON 8/23/2022] aspirin, 81 mg, Oral, Daily  atorvastatin, 40 mg, Oral, Nightly  LORazepam, 0.25 mg, Oral, BID  pantoprazole, 40 mg, Oral, QAM  sodium chloride, 10 mL, Intravenous, Q12H  triamterene-hydrochlorothiazide, 1 tablet, Oral, Daily       Infusions     PRN Medications •  acetaminophen **OR** acetaminophen **OR** acetaminophen  •  nitroglycerin  •  ondansetron  •  [COMPLETED] Insert peripheral IV **AND** sodium  "chloride  •  sodium chloride     Physical Findings        Physical Appearance alert, oriented     NFPE Pending   --  Edema  no edema   Gastrointestinal last bowel movement: 8/21   Tubes/Drains none   Oral/Mouth Cavity KALYAN    Skin skin intact   --  Current Nutrition Orders & Evaluation of Intake       Oral Nutrition     Food Allergies NKFA   Current PO Diet Diet Regular; Cardiac   Supplement n/a   PO Evaluation     Trending % PO Intake Minimal        --  Estimated/Assessed Needs       Energy Requirements    Height for Calculation  Height: 157.5 cm (62\")   Weight for Calculation 123# (55.8kg)    Method for Estimation  25 kcal/kg, 30 kcal/kg   EST Needs (kcal/day) 8064-5119       Protein Requirements    Weight for Calculation 123# (55.8kg)    EST Protein Needs (g/kg) 1.0 gm/kg, 1.2 gm/kg   EST Daily Needs (g/day) 56-67       Fluid Requirements 1 ml/kcal     Estimated Needs (mL/day) 8067-9950       Fluid Deficit    Current Na Level (mEq/L)    Desired Na Level (mEq/L)    Estimated Fluid Deficit (L)       Nutrition Diagnosis        Nutrition Dx Problem 1 Problem: Inadequate Nutrient Intake    Etiology: Factors Affecting Nutrition    Signs/Symptoms: PO intake and Report of Minimal PO Intake     --  Intervention Goal        Intervention Goal(s) Nutrition support treatment, Palliative Care, Reduce/improve symptoms, Meet estimated needs, Increase intake, Maintain weight and PO intake goal %: 75     Nutrition Intervention        RD Action Interview for preferences, Follow Tx Progress and Care plan reviewed     Nutrition Prescription        Diet Prescription Regular    Supplement Prescription n/a   Prescription Ordered Yes    --  Monitor/Evaluation        Monitor Per protocol, I&O, PO intake, Pertinent labs, Weight, Skin status, GI status, Symptoms, POC/GOC     RD to follow per protocol.      Electronically signed by:  IZABELA BARRIENTOS RD  08/22/22 14:24 EDT  "

## 2022-08-22 NOTE — PROGRESS NOTES
[facility] Acute Rehab  Stroke screening per stroke order set via Epic. Please note that this is a screening. If you feel that this pt is or will be appropriate for Acute Rehab, please contact the Admission Office at [phone] and a full evaluation will be initiated.     Elena Padilla RN  Acute Rehab Admission Nurse

## 2022-08-22 NOTE — ED PROVIDER NOTES
EMERGENCY DEPARTMENT ENCOUNTER    CHIEF COMPLAINT  Chief Complaint: Dizzy  History given by: Patient  History limited by: None  Room Number: 20/20  PMD: Gal Ortega MD      HPI:  Pt is a 89 y.o. female who presents complaining of dizziness that the patient best describes as more of a lightheaded type sensation with the feeling as though she is going to pass out that began shortly prior to ED arrival tonight.  She does report that if she attempted to walk around now that she does not feel like she could do so but is uncertain as to why.  She denies any spinning type sensation.  She did have associated nausea but denies vomiting, fever/chills, chest pain, neck pain, shortness of breath, or abdominal pain.    Duration: Ongoing for a few hours prior to ED arrival  Onset: Sudden  Location: Brain/neurological  Radiation: None  Quality: Dizzy  Intensity/Severity: Moderate  Progression: Worsening  Associated Symptoms: Nausea  Aggravating Factors: None  Alleviating Factors: None  Previous Episodes: None  Treatment before arrival: None    PAST MEDICAL HISTORY  Active Ambulatory Problems     Diagnosis Date Noted   • Anxiety 04/27/2016   • Benzodiazepine dependence ([facility]) 04/27/2016   • Generalized anxiety disorder 04/27/2016   • Gastroesophageal reflux disease 04/27/2016   • Essential hypertension 04/27/2016   • Peripheral neuropathy 04/27/2016   • Acoustic neuroma ([facility]) 04/27/2016   • Malignant neoplasm of overlapping sites of right breast in female, estrogen receptor positive ([facility]) 04/27/2016   • Vitamin B12 deficiency 04/27/2016   • Radial artery aneurysm, 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 pylori  infection    • H/O Migraines    • Hyperlipidemia    • Hyperplastic polyps of stomach    • Hypertension    • Knee swelling 1995   • Osteoporosis    • Psychiatric disorder        PAST SURGICAL HISTORY  Past Surgical History:   Procedure Laterality Date   • APPENDECTOMY  1944   • BREAST BIOPSY Right 2010   • BREAST LUMPECTOMY     • BREAST LUMPECTOMY WITH SENTINEL NODE BIOPSY Right 2013   • COLONOSCOPY      Diverticular disease   • DILATATION AND CURETTAGE  1957, 1971    x2       FAMILY HISTORY  Family History   Problem Relation Age of Onset   • Dementia Mother    • Cancer Mother 81        Breast removed   • Cancer Father 69        Throat; smoker   • Cancer Maternal Aunt         2 aunts at age 49 and 70; deaths unrelated to cancer   • Other Son         Brain tumor   • Hypertension Son        SOCIAL HISTORY  Social History     Socioeconomic History   • Marital status:      Spouse name: Marvin   • Number of children: 1   • Years of education: High School   Tobacco Use   • Smoking status: Never Smoker   • Smokeless tobacco: Never Used   Vaping Use   • Vaping Use: Never used   Substance and Sexual Activity   • Alcohol use: No   • Drug use: No   • Sexual activity: Never       ALLERGIES  Cortisone, Diphenhydramine hcl (sleep), Epinephrine, Penicillins, and Sulfa antibiotics    REVIEW OF SYSTEMS  Review of Systems   Constitutional: Negative for fever.   HENT: Negative for sore throat.    Eyes: Negative.    Respiratory: Negative for cough and shortness of breath.    Cardiovascular: Negative for chest pain.   Gastrointestinal: Positive for nausea. Negative for abdominal pain, diarrhea and vomiting.   Genitourinary: Negative for dysuria.   Musculoskeletal: Negative for neck pain.   Skin: Negative for rash.   Allergic/Immunologic: Negative.    Neurological: Positive for dizziness and light-headedness. Negative for weakness, numbness and headaches.        Near syncope   Hematological: Negative.    Psychiatric/Behavioral:  Negative.    All other systems reviewed and are negative.      PHYSICAL EXAM  ED Triage Vitals [08/21/22 2247]   Temp Heart Rate Resp BP SpO2   97.2 °F (36.2 °C) 81 18 144/70 97 %      Temp src Heart Rate Source Patient Position BP Location FiO2 (%)   -- -- -- -- --       Physical Exam  Vitals and nursing note reviewed.   Constitutional:       General: She is not in acute distress.  HENT:      Head: Normocephalic and atraumatic.   Eyes:      Pupils: Pupils are equal, round, and reactive to light.   Cardiovascular:      Rate and Rhythm: Normal rate and regular rhythm.      Heart sounds: Normal heart sounds.   Pulmonary:      Effort: Pulmonary effort is normal. No respiratory distress.      Breath sounds: Normal breath sounds.   Abdominal:      Palpations: Abdomen is soft.      Tenderness: There is no abdominal tenderness. There is no guarding or rebound.   Musculoskeletal:         General: Normal range of motion.      Cervical back: Normal range of motion and neck supple.   Skin:     General: Skin is warm and dry.      Findings: No rash.   Neurological:      Mental Status: She is alert and oriented to person, place, and time.      Sensory: Sensation is intact.   Psychiatric:         Mood and Affect: Mood and affect normal.         LAB RESULTS  Lab Results (last 24 hours)     Procedure Component Value Units Date/Time    CBC & Differential [[id number]]  (Normal) Collected: 08/21/22 2318    Specimen: Blood Updated: 08/21/22 2327    Narrative:      The following orders were created for panel order CBC & Differential.  Procedure                               Abnormality         Status                     ---------                               -----------         ------                     CBC Auto Differential[[id number]]        Normal              Final result                 Please view results for these tests on the individual orders.    Comprehensive Metabolic Panel [[id number]]  (Abnormal) Collected: 08/21/22 2318     Specimen: Blood Updated: 08/22/22 0015     Glucose 108 mg/dL      BUN 22 mg/dL      Creatinine 1.03 mg/dL      Sodium 136 mmol/L      Potassium 4.9 mmol/L      Comment: Specimen hemolyzed.  Results may be affected.        Chloride 99 mmol/L      CO2 26.0 mmol/L      Calcium 9.3 mg/dL      Total Protein 6.3 g/dL      Albumin 3.90 g/dL      ALT (SGPT) 8 U/L      Comment: Specimen hemolyzed.  Results may be affected.        AST (SGOT) 24 U/L      Comment: Specimen hemolyzed.  Results may be affected.        Alkaline Phosphatase 70 U/L      Total Bilirubin 0.6 mg/dL      Globulin 2.4 gm/dL      A/G Ratio 1.6 g/dL      BUN/Creatinine Ratio 21.4     Anion Gap 11.0 mmol/L      eGFR 50.8 mL/min/1.73      Comment: National Kidney Foundation and American Society of Nephrology (ASN) Task Force recommended calculation based on the Chronic Kidney Disease Epidemiology Collaboration (CKD-EPI) equation refit without adjustment for race.       Narrative:      GFR Normal >60  Chronic Kidney Disease <60  Kidney Failure <15      Protime-INR [[id number]]  (Normal) Collected: 08/21/22 2318    Specimen: Blood Updated: 08/21/22 2349     Protime 13.0 Seconds      INR 0.99    aPTT [[id number]]  (Normal) Collected: 08/21/22 2318    Specimen: Blood Updated: 08/21/22 2349     PTT 29.5 seconds     Troponin [[id number]]  (Normal) Collected: 08/21/22 2318    Specimen: Blood Updated: 08/22/22 0006     Troponin T <0.010 ng/mL      Comment: Specimen hemolyzed.  Results may be affected.       Narrative:      Troponin T Reference Range:  <= 0.03 ng/mL-   Negative for AMI  >0.03 ng/mL-     Abnormal for myocardial necrosis.  Clinicians would have to utilize clinical acumen, EKG, Troponin and serial changes to determine if it is an Acute Myocardial Infarction or myocardial injury due to an underlying chronic condition.       Results may be falsely decreased if patient taking Biotin.      CBC Auto Differential [[id number]]  (Normal) Collected: 08/21/22 2318     Specimen: Blood Updated: 08/21/22 2327     WBC 6.81 10*3/mm3      RBC 4.21 10*6/mm3      Hemoglobin 12.8 g/dL      Hematocrit 37.8 %      MCV 89.8 fL      MCH 30.4 pg      MCHC 33.9 g/dL      RDW 12.7 %      RDW-SD 40.9 fl      MPV 9.6 fL      Platelets 224 10*3/mm3      Neutrophil % 60.1 %      Lymphocyte % 26.1 %      Monocyte % 10.4 %      Eosinophil % 2.5 %      Basophil % 0.6 %      Immature Grans % 0.3 %      Neutrophils, Absolute 4.09 10*3/mm3      Lymphocytes, Absolute 1.78 10*3/mm3      Monocytes, Absolute 0.71 10*3/mm3      Eosinophils, Absolute 0.17 10*3/mm3      Basophils, Absolute 0.04 10*3/mm3      Immature Grans, Absolute 0.02 10*3/mm3      nRBC 0.0 /100 WBC           I ordered the above labs and reviewed the results    RADIOLOGY  CT Angiogram Head    (Results Pending)   CT Angiogram Neck    (Results Pending)        I ordered the above noted radiological studies. Interpreted by radiologist. Discussed with radiologist (Dr. Pierson). Reviewed by me in PACS.       PROCEDURES  Procedures  EKG          EKG time: 2315  Rhythm/Rate: NSR, 73  P waves and WY: nml  QRS, axis: nml, nml   ST and T waves: nml     Interpreted Contemporaneously by me, independently viewed  unchanged compared to prior 7/24/13      PROGRESS AND CONSULTS     The patient was wearing a facemask upon entrance into the room and remained in such throughout their visit.  I was wearing PPE including a facemask, eye protection, as well as gloves at any point entering the room and throughout the visit    0120  On reevaluation, the patient is resting comfortably and without acute complaint.  Vital signs are stable.  I did inform the patient that her work-up shows normal labs but a subtle abnormality on CTA scan of the head with the potential abnormality at her left vertebral artery.  She does need further work-up for this as well as the ongoing lightheaded/dizzy sensation.  We will admit her to the hospital and she is in full agreement with  the plan.  All questions have been answered.    0130  Case discussed with PENG Spain for [facility], who agrees to admit the patient to the hospital for Dr. Cruz.    MEDICAL DECISION MAKING  Results were reviewed/discussed with the patient and they were also made aware of online access. Pt also made aware that some labs, such as cultures, will not be resulted during ER visit and follow up with PMD is necessary.     MDM  Number of Diagnoses or Management Options     Amount and/or Complexity of Data Reviewed  Clinical lab tests: ordered and reviewed  Tests in the radiology section of CPT®: ordered and reviewed  Review and summarize past medical records: yes (Upon medical records review, the patient was last seen at urgent care and evaluated on 7/1/2022 secondary to dizziness.)  Discuss the patient with other providers: yes (PENG Spain for [facility], who agrees to admit the patient to the hospital for Dr. Cruz.)  Independent visualization of images, tracings, or specimens: yes (CTA head/neck showing no acute infarct or bleed however a potential abnormality at the left vertebral artery)           DIAGNOSIS  Final diagnoses:   Dizzy spells   Near syncope   Abnormal CT of the head       DISPOSITION  ADMISSION    Discussed treatment plan and reason for admission with pt/family and admitting physician.  Pt/family voiced understanding of the plan for admission for further testing/treatment as needed.         Latest Documented Vital Signs:  As of 01:33 EDT  BP- 130/67 HR- 70 Temp- 97.2 °F (36.2 °C) O2 sat- 98%         Juventino Ramos MD  08/22/22 0133

## 2022-08-22 NOTE — PLAN OF CARE
Goal Outcome Evaluation:               Discussed with RN. Patient passed swallow screen X2 and tolerating regular diet. SLP to s/o at this time. Please re-consult as warranted or with concern for need for speech/language/cognitive eval.

## 2022-08-23 ENCOUNTER — READMISSION MANAGEMENT (OUTPATIENT)
Dept: CALL CENTER | Facility: HOSPITAL | Age: 87
End: 2022-08-23

## 2022-08-23 VITALS
DIASTOLIC BLOOD PRESSURE: 75 MMHG | BODY MASS INDEX: 25.8 KG/M2 | HEART RATE: 60 BPM | OXYGEN SATURATION: 98 % | SYSTOLIC BLOOD PRESSURE: 145 MMHG | TEMPERATURE: 97.9 F | RESPIRATION RATE: 18 BRPM | HEIGHT: 62 IN | WEIGHT: 140.21 LBS

## 2022-08-23 DIAGNOSIS — I77.74 VERTEBRAL ARTERY DISSECTION: Primary | ICD-10-CM

## 2022-08-23 PROBLEM — I95.1 ORTHOSTASIS: Status: ACTIVE | Noted: 2022-08-23

## 2022-08-23 PROCEDURE — G0378 HOSPITAL OBSERVATION PER HR: HCPCS

## 2022-08-23 PROCEDURE — 99214 OFFICE O/P EST MOD 30 MIN: CPT | Performed by: NURSE PRACTITIONER

## 2022-08-23 RX ORDER — LORAZEPAM 0.5 MG/1
0.25 TABLET ORAL 2 TIMES DAILY PRN
Status: DISCONTINUED | OUTPATIENT
Start: 2022-08-23 | End: 2022-08-23 | Stop reason: HOSPADM

## 2022-08-23 RX ORDER — ASPIRIN 81 MG/1
81 TABLET ORAL DAILY
Qty: 30 TABLET | Refills: 0 | Status: SHIPPED | OUTPATIENT
Start: 2022-08-24 | End: 2022-08-26

## 2022-08-23 RX ORDER — ATORVASTATIN CALCIUM 40 MG/1
40 TABLET, FILM COATED ORAL NIGHTLY
Qty: 30 TABLET | Refills: 0 | Status: SHIPPED | OUTPATIENT
Start: 2022-08-23 | End: 2022-08-26

## 2022-08-23 RX ADMIN — TRIAMTERENE AND HYDROCHLOROTHIAZIDE 1 TABLET: 37.5; 25 TABLET ORAL at 08:52

## 2022-08-23 RX ADMIN — Medication 10 ML: at 08:52

## 2022-08-23 RX ADMIN — PANTOPRAZOLE SODIUM 40 MG: 40 TABLET, DELAYED RELEASE ORAL at 06:47

## 2022-08-23 RX ADMIN — ASPIRIN 81 MG: 81 TABLET, COATED ORAL at 08:52

## 2022-08-23 NOTE — PLAN OF CARE
Problem: Adult Inpatient Plan of Care  Goal: Plan of Care Review  Outcome: Met  Goal: Patient-Specific Goal (Individualized)  Outcome: Met  Goal: Absence of Hospital-Acquired Illness or Injury  Outcome: Met  Intervention: Identify and Manage Fall Risk  Recent Flowsheet Documentation  Taken 8/23/2022 1000 by Roberth Zheng RN  Safety Promotion/Fall Prevention:   activity supervised   assistive device/personal items within reach   clutter free environment maintained   fall prevention program maintained   gait belt   lighting adjusted   mobility aid in reach   muscle strengthening facilitated   nonskid shoes/slippers when out of bed   room organization consistent   safety round/check completed  Taken 8/23/2022 0840 by Roberth Zheng RN  Safety Promotion/Fall Prevention:   activity supervised   assistive device/personal items within reach   clutter free environment maintained   fall prevention program maintained   gait belt   lighting adjusted   mobility aid in reach   muscle strengthening facilitated   nonskid shoes/slippers when out of bed   room organization consistent   safety round/check completed  Intervention: Prevent Skin Injury  Recent Flowsheet Documentation  Taken 8/23/2022 0840 by Roberth Zheng RN  Body Position: supine  Intervention: Prevent and Manage VTE (Venous Thromboembolism) Risk  Recent Flowsheet Documentation  Taken 8/23/2022 0840 by Roberth Zheng RN  Activity Management: up in chair  Range of Motion: active ROM (range of motion) encouraged  Goal: Optimal Comfort and Wellbeing  Outcome: Met  Intervention: Provide Person-Centered Care  Recent Flowsheet Documentation  Taken 8/23/2022 0840 by Roberth Zheng RN  Trust Relationship/Rapport:   care explained   choices provided   questions answered   questions encouraged  Goal: Readiness for Transition of Care  Outcome: Met     Problem: Hypertension Comorbidity  Goal: Blood Pressure in Desired Range  Outcome: Met  Intervention: Maintain  Blood Pressure Management  Recent Flowsheet Documentation  Taken 8/23/2022 1000 by Roberth Zheng, RN  Medication Review/Management: medications reviewed  Taken 8/23/2022 0840 by Roberth Zheng RN  Medication Review/Management: medications reviewed     Problem: Skin Injury Risk Increased  Goal: Skin Health and Integrity  Outcome: Met  Intervention: Optimize Skin Protection  Recent Flowsheet Documentation  Taken 8/23/2022 0840 by Roberth Zheng RN  Pressure Reduction Techniques:   frequent weight shift encouraged   heels elevated off bed  Pressure Reduction Devices:   pressure-redistributing mattress utilized   alternating pressure pump (ADD)   Goal Outcome Evaluation:  Plan of Care Reviewed With: patient, daughter

## 2022-08-23 NOTE — DISCHARGE PLACEMENT REQUEST
"Charo Lopes (89 y.o. Female)             Date of Birth   04/03/1929    Social Security Number       Address   [address] [place] [address]    Home Phone   [phone]    MRN   [id number]       [facility]    Marital Status                               Admission Date   8/21/22    Admission Type   Emergency    Admitting Provider   Aj Cruz MD    Attending Provider   Sandoval Polanco MD    Department, Room/Bed   [address], P586/1       Discharge Date       Discharge Disposition   [place]    [place]charge Destination                               Attending Provider: Sandoval Polanco MD    Allergies: Cortisone, Diphenhydramine Hcl (Sleep), Epinephrine, Penicillins, Sulfa Antibiotics    Isolation: None   Infection: None   Code Status: CPR   Advance Care Planning Activity    Ht: 157.5 cm (62\")   Wt: 63.6 kg (140 lb 3.4 oz)    Admission Cmt: None   Principal Problem: Dizzy spells [R42]                 Active Insurance as of 8/21/2022     Primary Coverage     Payor Plan Insurance Group Employer/Plan Group    [facility] MEDICARE REPLACEMENT [facility] MEDICARE REPLACEMENT [id number]     Payor Plan Address Payor Plan Phone Number Payor Plan Fax Number Effective Dates    [address] [address]   1/1/2017 - None Entered    [facility] [id number]       Subscriber Name Subscriber Birth Date Member ID       CHARO LOPES 4/3/1929 [id number]                 Emergency Contacts      (Rel.) Home Phone Work Phone Mobile Phone    French (HCS)Monet (Daughter) [phone] -- --            "

## 2022-08-23 NOTE — DISCHARGE SUMMARY
Date of Admission: 8/21/2022  Date of Discharge:  8/23/2022  Primary Care Physician: Gal Ortega MD     Discharge Diagnosis:  Active Hospital Problems    Diagnosis  POA   • **Dizzy spells [R42]  Yes   • Orthostasis [I95.1]  Yes   • Dizziness [R42]  Yes   • Essential hypertension [I10]  Yes   • Malignant neoplasm of overlapping sites of right breast in female, estrogen receptor positive (HCC) [C50.811, Z17.0]  Not Applicable   • Benzodiazepine dependence (HCC) [F13.20]  Yes   • Acoustic neuroma (HCC) [D33.3]  Yes      Resolved Hospital Problems   No resolved problems to display.       Presenting Problem/History of Present Illness:  Abnormal CT of the head [R93.0]  Dizzy spells [R42]  Near syncope [R55]  Dizziness [R42]     Mrs. Thomas is a 89-year-old female with history of hypertension, benzodiazepine dependency, acoustic neuroma, peripheral neuropathy, GERD who presents to the emergency room with dizziness.  Patient describes sensation as lightheadedness, she states this started just hours ago prior to coming to the emergency room, she states she had a similar episode a few weeks ago but it resolved on its own.  She denies any recent fever or illness.  She denies any nausea or vomiting, no chest pain or shortness of breath, no abdominal pain.  In the emergency room patient's troponin was negative, glucose 108, sodium 136, potassium 4.9, creatinine 1.03, BUN 22, INR 1.99, white blood cell count 6.8, hemoglobin 12.8, hematocrit 37.8, platelets 224.  Urinalysis is negative, COVID-19 is negative, CTA of head and neck shows no acute intracranial findings there is suggestion of a tiny intimal flap within the left vertebral artery at the level of C4, it may represent area of calcification but small focal dissection is not excluded.    Hospital Course:  The patient is a 89 y.o. female who presented with presyncope and was found to have abnormal CTA in the emergency room.  She was admitted neurology was consulted.   She underwent MRI did not show any acute stroke.  The area of concern to is the left vertebral artery and dissection was not able to be ruled out versus atherosclerotic disease.  She is on an aspirin and statin.  Repeat CTA is planned in 3 months with neurology follow-up.  She was orthostatic with occupational therapy with a systolic blood pressure dropping from 142-107.  Discussed possibility of decreasing her blood pressure medication with her family at bedside.  They reported she has been stable on her current dose of medicine for some time and wanted to discuss any medication changes with her primary care physician before altering . she is going to make sure that she stays well-hydrated and is going to transition slowly between laying/sitting/standing.  If she has any additional presyncopal symptoms at home they are going to call their primary care provider and hold her antihypertensive.  Home health referral placed.    She has history of acoustic neuroma and there was no acute change on the MRI brain.    Exam Today:  General AA NAD  HEENT NCAT EOMI PERRL  CV RRR, sinus on EKG  Lungs CTA B  Abdomen ND NT  Extremity no cyanosis or edema  Neuro CN II to XII are grossly intact  Psych normal mood and affect    Results:  CTA Head and Neck  1. No acute intracranial findings.  2. Suggestion of a tiny intimal flap within the left vertebral artery at  the level of C4 on the axial images only. It may represent area of  eccentric calcified plaque, but small focal dissection is not excluded.  It is best seen on image 8 of the axial series. There is a further mild  focal narrowing of the left vertebral artery at the level of C2, in the  range of 30%.  3. Remainder of the cervical vasculature is widely patent.  4. Intracranial vessels are widely patent.    MRI Brain  1. No acute intracranial abnormality.  Previously noted area of  enhancement within the left auditory canal is stable when compared to  the exam from April 2021.  It has been previously felt to be an  acoustical neuroma.    Procedures Performed:         Consults:   Consults     Date and Time Order Name Status Description    8/22/2022  4:00 AM Inpatient Neurology Consult Stroke Completed     8/22/2022  1:09 AM AUNDREA (on-call MD unless specified) Details             Discharge Disposition:  Home-Health Care [facility]    Discharge Medications:     Discharge Medications      New Medications      Instructions Start Date   aspirin 81 MG EC tablet   81 mg, Oral, Daily   Start Date: August 24, 2022     atorvastatin 40 MG tablet  Commonly known as: LIPITOR   40 mg, Oral, Nightly         Continue These Medications      Instructions Start Date   Diclofenac Sodium 1 % gel gel  Commonly known as: VOLTAREN   4 g, Topical, 4 Times Daily PRN      lansoprazole 30 MG capsule  Commonly known as: PREVACID   Take 1 capsule by mouth once daily      LORazepam 0.5 MG tablet  Commonly known as: ATIVAN   Take 1/2 (one-half) tablet by mouth twice daily      triamterene-hydrochlorothiazide 37.5-25 MG per capsule  Commonly known as: DYAZIDE   Take 1 capsule by mouth once daily      VITAMIN-B COMPLEX PO   Oral, Daily         Stop These Medications    Vitamin D 50 MCG (2000 UT) capsule            Discharge Diet:   Diet Instructions     Advance Diet As Tolerated            Activity at Discharge:   Activity Instructions     Activity as Tolerated            Follow-up Appointments:   Follow-up Information     Gal Ortega MD .    Specialty: Family Medicine  Contact information:  [address] [facility] [address]  [facility] [id number] [phone]             Raul Macdonald MD .    Specialty: Internal Medicine           [place]azar, PENG Follow up.    Specialties: Neurology, Nurse Practitioner, Emergency Medicine  Contact information:  [address] [facility] [[email or web address] [facility] [address]  [facility] [id number] [phone]                         Test Results Pending at Discharge:       Sandoval Polanco MD  08/23/22  09:14 EDT    Time Spent  on Discharge Activities: >30 minutes    Dictated portions using Dragon dictation software.  During the entire encounter, I was wearing recommended PPE including face mask and eye protection. Hand sanitization was performed prior to entering room and upon exit.

## 2022-08-23 NOTE — PROGRESS NOTES
Case Management Discharge Note      Final Note: Pt discharging home with [facility] (not in EPIC, PT/OT order printed and provided to pt/daughter). Daughter to transport home today. Mora Franklin LCSW    Provided Post Acute Provider List?: Yes  Post Acute Provider List: Home Health    Selected Continued Care - Admitted Since 8/21/2022     Destination    No services have been selected for the patient.              Durable Medical Equipment    No services have been selected for the patient.              Dialysis/Infusion    No services have been selected for the patient.              Home Medical Care    No services have been selected for the patient.              Therapy    No services have been selected for the patient.              Community Resources    No services have been selected for the patient.              Community & DME    No services have been selected for the patient.                  Transportation Services  Private: Car    Final Discharge Disposition Code: 06 - home with home health care

## 2022-08-23 NOTE — OUTREACH NOTE
Prep Survey    Flowsheet Row Responses   [facility] patient discharged from? [place]   Is LACE score < 7 ? Yes   Emergency Room discharge w/ pulse ox? No   Eligibility [facility]   Date of Admission 08/21/22   Date of Discharge 08/23/22   Discharge Disposition Home or Self Care   Discharge diagnosis Dizziness   Does the patient have one of the following disease processes/diagnoses(primary or secondary)? Other   Does the patient have Home health ordered? Yes   What is the Home health agency?  [facility]     Is there a DME ordered? No   Prep survey completed? Yes          SCOTT GONSALVES - Registered Nurse

## 2022-08-23 NOTE — PROGRESS NOTES
DOS: 2022  NAME: Charo Thomas   : 4/3/1929  PCP: Gal Ortega MD    Chief Complaint   Patient presents with   • Dizziness        Stroke    Subjective: Pt seen in follow up, however the problem is new to me.  Patient sitting up in her recliner doing well.  Denies any new weakness, numbness, speech or visual disturbances, or headaches.  No further dizzy spells.  Daughter at bedside.    Objective:  Vital signs:      Vitals:    22 0038 22 0500   BP:  141/85 126/58 145/75   BP Location:  Right arm Right arm Left arm   Patient Position:  Sitting Sitting Lying   Pulse: 72 84 71 60   Resp:  18 18 18   Temp:  98.6 °F (37 °C) 98.9 °F (37.2 °C) 97.9 °F (36.6 °C)   TempSrc:  Oral Oral Oral   SpO2: 96% 97% 95% 98%   Weight:    63.6 kg (140 lb 3.4 oz)   Height:           Current Facility-Administered Medications:   •  acetaminophen (TYLENOL) tablet 650 mg, 650 mg, Oral, Q4H PRN **OR** acetaminophen (TYLENOL) 160 MG/5ML solution 650 mg, 650 mg, Oral, Q4H PRN **OR** acetaminophen (TYLENOL) suppository 650 mg, 650 mg, Rectal, Q4H PRN, Silvana Denny APRN  •  aspirin EC tablet 81 mg, 81 mg, Oral, Daily, Le Lazar APRN, 81 mg at 22 0852  •  atorvastatin (LIPITOR) tablet 40 mg, 40 mg, Oral, Nightly, Le Lazar APRDANA  •  LORazepam (ATIVAN) tablet 0.25 mg, 0.25 mg, Oral, BID PRN, Sandoval Polanco MD  •  nitroglycerin (NITROSTAT) SL tablet 0.4 mg, 0.4 mg, Sublingual, Q5 Min PRN, Silvana Denny APRN  •  ondansetron (ZOFRAN) injection 4 mg, 4 mg, Intravenous, Q6H PRN, Silvana Denny APRN  •  pantoprazole (PROTONIX) EC tablet 40 mg, 40 mg, Oral, QAM, Silvana Denny, PENG, 40 mg at 22 0647  •  [COMPLETED] Insert peripheral IV, , , Once **AND** sodium chloride 0.9 % flush 10 mL, 10 mL, Intravenous, PRN, Juventino Ramos MD  •  sodium chloride 0.9 % flush 10 mL, 10 mL, Intravenous, Q12H, Silvana Denny APRN, 10 mL at 22  0852  •  sodium chloride 0.9 % flush 10 mL, 10 mL, Intravenous, PRN, Silvana Denny, APRN  •  triamterene-hydrochlorothiazide (MAXZIDE-25) 37.5-25 MG per tablet 1 tablet, 1 tablet, Oral, Daily, Silvana Denny APRN, 1 tablet at 08/23/22 0852    Current Outpatient Medications:   •  [START ON 8/24/2022] aspirin 81 MG EC tablet, Take 1 tablet by mouth Daily., Disp: 30 tablet, Rfl: 0  •  atorvastatin (LIPITOR) 40 MG tablet, Take 1 tablet by mouth Every Night., Disp: 30 tablet, Rfl: 0  •  B Complex Vitamins (VITAMIN-B COMPLEX PO), Take  by mouth daily., Disp: , Rfl:   •  Diclofenac Sodium (VOLTAREN) 1 % gel gel, Apply 4 g topically to the appropriate area as directed 4 (Four) Times a Day As Needed., Disp: , Rfl:   •  lansoprazole (PREVACID) 30 MG capsule, Take 1 capsule by mouth once daily, Disp: 90 capsule, Rfl: 1  •  LORazepam (ATIVAN) 0.5 MG tablet, Take 1/2 (one-half) tablet by mouth twice daily, Disp: 60 tablet, Rfl: 0  •  triamterene-hydrochlorothiazide (DYAZIDE) 37.5-25 MG per capsule, Take 1 capsule by mouth once daily, Disp: 90 capsule, Rfl: 1    PRN meds  •  acetaminophen **OR** acetaminophen **OR** acetaminophen  •  LORazepam  •  nitroglycerin  •  ondansetron  •  [COMPLETED] Insert peripheral IV **AND** sodium chloride  •  sodium chloride    No current facility-administered medications on file prior to encounter.     Current Outpatient Medications on File Prior to Encounter   Medication Sig   • B Complex Vitamins (VITAMIN-B COMPLEX PO) Take  by mouth daily.   • Diclofenac Sodium (VOLTAREN) 1 % gel gel Apply 4 g topically to the appropriate area as directed 4 (Four) Times a Day As Needed.   • lansoprazole (PREVACID) 30 MG capsule Take 1 capsule by mouth once daily   • LORazepam (ATIVAN) 0.5 MG tablet Take 1/2 (one-half) tablet by mouth twice daily   • triamterene-hydrochlorothiazide (DYAZIDE) 37.5-25 MG per capsule Take 1 capsule by mouth once daily   • [DISCONTINUED] Cholecalciferol (VITAMIN D) 2000  UNITS capsule Take  by mouth.       General appearance: Elderly frail female, NAD, alert and cooperative, well groomed  HEENT: Normocephalic, atraumatic, no masses or tenderness  Resp: Even and unlabored  Extremities: No obvious edema  Skin: warm, dry    Neurological:   MS: oriented x3, able to correctly identify wall clock time, recent/remote memory intact, normal attention/concentration, language intact, no neglect, normal fund of knowledge  CN: visual acuity grossly normal, visual fields full, EOMI, facial sensation equal, no facial droop, tongue midline  Motor: 5/5 in all extremities except left lower extremity 4/5 secondary to hip pain  Sensory: light touch sensation decreased in LLE  Coordination: Normal finger to nose test  Gait and station: Deferred  Rapid alternating movements: normal finger to thumb tap    Laboratory results:  Lab Results   Component Value Date    TSH 3.10 02/08/2017     Lab Results   Component Value Date    HGBA1C 5.50 08/22/2022     Lab Results   Component Value Date    IBGUUBFN73 1,753 (H) 02/08/2017     Lab Results   Component Value Date    CHOL 282 (H) 08/22/2022     Lab Results   Component Value Date    TRIG 169 (H) 08/22/2022     Lab Results   Component Value Date    HDL 53 08/22/2022     Lab Results   Component Value Date     (H) 08/22/2022     Lab Results   Component Value Date    WBC 6.46 08/22/2022    HGB 13.4 08/22/2022    HCT 39.6 08/22/2022    MCV 91.7 08/22/2022     08/22/2022     Lab Results   Component Value Date    GLUCOSE 107 (H) 08/22/2022    BUN 18 08/22/2022    CREATININE 0.94 08/22/2022    EGFRIFNONA 45 (L) 02/10/2022    EGFRIFAFRI 64 04/28/2017    BCR 19.1 08/22/2022    K 3.8 08/22/2022    CO2 30.4 (H) 08/22/2022    CALCIUM 9.7 (H) 08/22/2022    PROTENTOTREF 7.1 02/08/2017    ALBUMIN 3.90 08/21/2022    LABIL2 1.8 02/08/2017    AST 24 08/21/2022    ALT 8 08/21/2022     Lab Results   Component Value Date    PTT 29.5 08/21/2022     Lab Results   Component  Value Date    INR 0.97 08/22/2022    INR 0.99 08/21/2022    PROTIME 12.8 08/22/2022    PROTIME 13.0 08/21/2022     Brief Urine Lab Results  (Last result in the past 365 days)      Color   Clarity   Blood   Leuk Est   Nitrite   Protein   CREAT   Urine HCG        08/22/22 0152 Yellow   Clear   Negative   Small (1+)   Negative   Negative                 Review and interpretation of imaging:  CT Angiogram Neck    Result Date: 8/22/2022  1. No acute intracranial findings. 2. Suggestion of a tiny intimal flap within the left vertebral artery at the level of C4 on the axial images only. It may represent area of eccentric calcified plaque, but small focal dissection is not excluded. It is best seen on image 8 of the axial series. There is a further mild focal narrowing of the left vertebral artery at the level of C2, in the range of 30%. 3. Remainder of the cervical vasculature is widely patent. 4. Intracranial vessels are widely patent.  Preliminary report was called to Dr. Ramos at 1:03 AM.   Radiation dose reduction techniques were utilized, including automated exposure control and exposure modulation based on body size.  This report was finalized on 8/22/2022 2:05 AM by Dr. Herlinda Pierson M.D.      MRI Brain With & Without Contrast    Result Date: 8/23/2022  1. No acute intracranial abnormality.  Previously noted area of enhancement within the left auditory canal is stable when compared to the exam from April 2021. It has been previously felt to be an acoustical neuroma.  This report was finalized on 8/23/2022 1:08 AM by Dr. Herlinda Pierson M.D.      CT Angiogram Head    Result Date: 8/22/2022  1. No acute intracranial findings. 2. Suggestion of a tiny intimal flap within the left vertebral artery at the level of C4 on the axial images only. It may represent area of eccentric calcified plaque, but small focal dissection is not excluded. It is best seen on image 8 of the axial series. There is a further mild focal  narrowing of the left vertebral artery at the level of C2, in the range of 30%. 3. Remainder of the cervical vasculature is widely patent. 4. Intracranial vessels are widely patent.  Preliminary report was called to Dr. Ramos at 1:03 AM.   Radiation dose reduction techniques were utilized, including automated exposure control and exposure modulation based on body size.  This report was finalized on 8/22/2022 2:05 AM by Dr. Herlinda Pierson M.D.        Impression/Assessment:  This is a 89-year-old female with past medical history of breast cancer, hyperlipidemia, hypertension, vitamin B12 deficiency who presented to the hospital on 8/21/2022 with complaints of dizziness which was described as lightheadedness.  Reportedly patient has chronic dizziness at baseline however she felt that this episode was worse than her normal therefore she presented to the ER.  She did not have any associated weakness, numbness, speech or visual disturbances, or headaches.    BP on arrival 144/70, HR 81.  EKG with NSR.  Temp 97.2.  .  She underwent a CTA head and neck that revealed suggestion of a tiny intimal flap within the left vertebral artery at the level of C4 suspicious for a small focal dissection versus calcific plaque.  MRI brain with and without obtained and revealed no evidence of restricted diffusion to suggest an acute infarction but did show enhancement within the left auditory canal which was present in her last MRI brain in April 2021.  This was felt to be an acoustic neuroma.  Chronic left cerebellar infarct, diffuse atrophy, and mild to moderate small vessel disease was also noted.    Orthostatics were performed with Occupational Therapy and her SBP dropped from 142-107.  Family opted not to address any BP dosage changes prior to discussing with the patient's PCP.    Diagnosis:  Near syncope with positive orthostatics  Abnormal CTA; concerning for left vertebral dissection  Left auditory canal enhancement  suspected to be acoustic neuroma  Hypertension  Hyperlipidemia    Plan:  MRI does not show any evidence of stroke but does show an area of enhancement suspected to be an acoustic neuroma although there has been no change or growth since her prior imaging in April of last year.  Discussed the findings with the patient and her daughter.  Patient's daughter reports that this has been known for several years and the patient has opted to not have any annual follow-up or surveillance for this therefore I will not order repeat MRI imaging.  I did recommend that she have her CTA head and neck repeated in 3 months to reassess the area suspicious for vertebral dissection and her and her daughter both agree therefore I am going to send a message today to our office to get this scheduled.  She should continue ASA 81 mg.  I did recommend she continue Lipitor 40 mg given her LDL was 197 however the patient's daughter stated that they would rather have the patient repeat a lipid panel to see if her LDL improves on its own due to daughter's concern of patient suffering from side effects from statins.  Of note, patient has never been on statin therapy.  Normalize BP with goal BP <130/80.  Maintain hydration  I will arrange follow-up with me in 3 months after her CTA head and neck has been performed.  We will sign off, will see again per request.    Case discussed with patient, daughter at bedside, OTIS Kapoor, and Dr. Jennings, and he agrees with plan above.     PENG Pandya

## 2022-08-24 ENCOUNTER — TRANSITIONAL CARE MANAGEMENT TELEPHONE ENCOUNTER (OUTPATIENT)
Dept: CALL CENTER | Facility: HOSPITAL | Age: 87
End: 2022-08-24

## 2022-08-24 NOTE — OUTREACH NOTE
Call Center TCM Note    Flowsheet Row Responses   [facility] patient discharged from? [place]   Does the patient have one of the following disease processes/diagnoses(primary or secondary)? Other   TCM attempt successful? Yes  [No current Verbal release]   Call start time 1416   Call end time 1420   Discharge diagnosis Dizziness   Person spoke with today (if not patient) and relationship Daughter    Meds reviewed with patient/caregiver? Yes   Is the patient having any side effects they believe may be caused by any medication additions or changes? No   Does the patient have all medications ordered at discharge? Yes   Is the patient taking all medications as directed (includes completed medication regime)? No   What is preventing the patient from taking all medications as directed? Desires to consult PCP first   Nursing Interventions Nurse provided patient education   Medication comments Daughter reports that Pt is taking ASA, however will not take statin until she speaks with her Dr.    Does the patient have a primary care provider?  Yes   Does the patient have an appointment with their PCP within 7 days of discharge? No   Comments regarding PCP No available HOSP DC FU appt that meets TCM guidelines.    What is preventing the patient from scheduling follow up appointments within 7 days of discharge? Earlier appointment not available   Nursing Interventions --  [Route to PCP office]   Has the patient kept scheduled appointments due by today? N/A   What is the Home health agency?  [facility]     Home health comments Daughter states they have a printed out order and will start therapy with a different company   Psychosocial issues? No   Did the patient receive a copy of their discharge instructions? Yes   Nursing interventions Reviewed instructions with patient   What is the patient's perception of their health status since discharge? Improving   Is the patient/caregiver able to teach back signs and  symptoms related to disease process for when to call PCP? Yes   Is the patient/caregiver able to teach back signs and symptoms related to disease process for when to call 911? Yes   Is the patient/caregiver able to teach back the hierarchy of who to call/visit for symptoms/problems? PCP, Specialist, Home health nurse, Urgent Care, ED, 911 Yes   TCM call completed? Yes   Wrap up additional comments Daughter reports Pt will be starting therapy.           Estephanie Walker RN    8/24/2022, 14:23 EDT

## 2022-08-26 ENCOUNTER — TELEPHONE (OUTPATIENT)
Dept: NEUROLOGY | Facility: CLINIC | Age: 87
End: 2022-08-26

## 2022-08-26 ENCOUNTER — TELEPHONE (OUTPATIENT)
Dept: FAMILY MEDICINE CLINIC | Facility: CLINIC | Age: 87
End: 2022-08-26

## 2022-08-26 ENCOUNTER — TELEMEDICINE (OUTPATIENT)
Dept: FAMILY MEDICINE CLINIC | Facility: CLINIC | Age: 87
End: 2022-08-26

## 2022-08-26 VITALS — WEIGHT: 125 LBS | BODY MASS INDEX: 22.86 KG/M2 | DIASTOLIC BLOOD PRESSURE: 66 MMHG | SYSTOLIC BLOOD PRESSURE: 113 MMHG

## 2022-08-26 DIAGNOSIS — Z09 HOSPITAL DISCHARGE FOLLOW-UP: Primary | ICD-10-CM

## 2022-08-26 DIAGNOSIS — R55 NEAR SYNCOPE: ICD-10-CM

## 2022-08-26 DIAGNOSIS — R42 DIZZINESS: Primary | ICD-10-CM

## 2022-08-26 PROCEDURE — 99495 TRANSJ CARE MGMT MOD F2F 14D: CPT | Performed by: FAMILY MEDICINE

## 2022-08-26 PROCEDURE — 1111F DSCHRG MED/CURRENT MED MERGE: CPT | Performed by: FAMILY MEDICINE

## 2022-08-26 NOTE — TELEPHONE ENCOUNTER
Called patient to schedule this appointment and she asked that I call her daughter Monet.  Monet states that she is not home at this time and cannot schedule the appointment at this time.

## 2022-08-26 NOTE — TELEPHONE ENCOUNTER
----- Message from PENG Peace sent at 8/23/2022  1:26 PM EDT -----  Regarding: CTA follow up  Shala, can you please schedule this patient to have a CTA head and neck for left vertebral dissection follow-up in 3 months and schedule her to see me or Dr. Jennings in 3 months for follow-up.  She is currently inpatient but she is going to be discharged soon FYI.    Estefanía

## 2022-08-26 NOTE — PROGRESS NOTES
Transitional Care Follow Up Visit  Subjective     Charo Thomas is a 89 y.o. female who presents for a transitional care management visit.    You have chosen to receive care through a telehealth visit.  Do you consent to use a video/audio connection for your medical care today? Yes      Within 48 business hours after discharge our office contacted her via telephone to coordinate her care and needs.      I reviewed and discussed the details of that call along with the discharge summary, hospital problems, inpatient lab results, inpatient diagnostic studies, and consultation reports with Charo.     Current outpatient and discharge medications have been reconciled for the patient.  Reviewed by: Gal Ortega MD      Date of TCM Phone Call 8/23/2022   [facility]   Date of Admission 8/21/2022   Date of Discharge 8/23/2022   Discharge Disposition Home or Self Care     Risk for Readmission (LACE) Score: 4 (8/23/2022  6:01 AM)      History of Present Illness   Course During Hospital Stay: Hospital follow-up.    Patient was admitted on August 21 and discharged on August 23.  She was going to bed that evening.  Sitting in bed.  Felt very woozy and lightheaded like she might pass out.  She did not have true vertigo.  She has remote history of acoustic neuroma but there is no change in hearing.  She had no focal neurological deficits.  Her blood pressure was.  However in the emergency room her blood pressure was on the low side.  She takes triamterene hydrochlorothiazide, lorazepam 1/2 tablet twice a day for about 40 years, vitamin D and a PPI.  Her admitting electrolytes were essentially normal.  Her CBC was normal.  Negative COVID test.  Her neurological exam was nonfocal.  She had a CT angiography performed which revealed a questionable intimal flap in one of the vertebral arteries.  She was kept in the hospital for observation.  She was started on aspirin and a statin.  MRI was performed which  demonstrated a acoustic neuroma which is unchanged from previously.  I reviewed the CT angiography report.  Shows a suggestion of a very tiny intimal flap of the left vertebral artery at the level of C4 on the axial image only.  If not may represent an area of eccentric calcified plaque but I could not rule out a small focal dissection.  However the MRI did revealed a tiny old cerebellar infarct but nothing new.  The remainder of the cervical vasculature was described as widely patent.  And intracranial vessels described as widely patent.  Since that initial brief episode of lightheadedness she states she has felt fine.  Her blood pressure today is running between 113 and 120 systolic.  No vertigo.  No fever.  Otherwise feels well.  She has home health visiting her.     The following portions of the patient's history were reviewed and updated as appropriate: allergies, current medications, past family history, past medical history, past social history, past surgical history and problem list.    Review of Systems    Objective   Physical Exam  Constitutional:       Comments: It is a fairly good video connection.  Her face is symmetric.  Her extraocular muscle movements.  All directions.  Her speech is unremarkable.  Her her cognition seems unchanged.         Assessment & Plan   Diagnoses and all orders for this visit:    1. Hospital discharge follow-up (Primary)    2. Near syncope      Hospital discharge TPN management video visit.  For near syncopal episode.  She had some low blood pressures at the hospital.  There was a questionable intimal flap on one of the vertebral arteries, but her symptoms were fairly fleeting only for maybe an hour or more.  She had no ataxia.  The MRI showed no ischemia or infarct.  I am recommending she stop her triamterene hydrochlorothiazide and check blood pressures through the weekend.  I recommended she stop the aspirin.  She is at high fall risk, and it is less likely these recent  symptoms were caused by a vertebral artery dissection.  I believe the benefits of the aspirin do not outweigh the potential risks of intracranial bleeding from fall.  She also has chronic benzodiazepine dependence which complicates things.  We have tried to wean in the past and have been unsuccessful.  She is been on the benzodiazepines for 40 years.  She will keep the neurology appointment and repeat the MRI in 3 months as scheduled.  They are going to send me blood pressure readings in 72 hours.  At this time given the widely patent intracerebral and cervical blood vessels, and the nature of her symptoms, this is likely not a episode of CNS ischemia and the statin is likely not indicated.  In 89 years of age, the risk of statins likely outweigh benefits.  They will be contacting you early next week.  She will continue with home health and physical therapy.         Duration of encounter 35 minutes including review of hospital records, separate history taking, physical examination via video, medication management, and documentation of medical record.

## 2022-09-29 DIAGNOSIS — Z00.00 MEDICARE ANNUAL WELLNESS VISIT, SUBSEQUENT: ICD-10-CM

## 2022-09-29 DIAGNOSIS — Z00.00 MEDICARE ANNUAL WELLNESS VISIT, SUBSEQUENT: Primary | ICD-10-CM

## 2022-09-29 LAB
ALBUMIN SERPL-MCNC: 4.4 G/DL (ref 3.5–5.2)
ALBUMIN/GLOB SERPL: 2.2 G/DL
ALP SERPL-CCNC: 81 U/L (ref 39–117)
ALT SERPL-CCNC: 8 U/L (ref 1–33)
AST SERPL-CCNC: 17 U/L (ref 1–32)
BILIRUB SERPL-MCNC: 1.2 MG/DL (ref 0–1.2)
BUN SERPL-MCNC: 19 MG/DL (ref 8–23)
BUN/CREAT SERPL: 20.9 (ref 7–25)
CALCIUM SERPL-MCNC: 10.1 MG/DL (ref 8.2–9.6)
CHLORIDE SERPL-SCNC: 105 MMOL/L (ref 98–107)
CHOLEST SERPL-MCNC: 321 MG/DL (ref 0–200)
CO2 SERPL-SCNC: 27.2 MMOL/L (ref 22–29)
CREAT SERPL-MCNC: 0.91 MG/DL (ref 0.57–1)
EGFRCR SERPLBLD CKD-EPI 2021: 58.9 ML/MIN/1.73
GLOBULIN SER CALC-MCNC: 2 GM/DL
GLUCOSE SERPL-MCNC: 96 MG/DL (ref 65–99)
HDLC SERPL-MCNC: 51 MG/DL (ref 40–60)
LDLC SERPL CALC-MCNC: 236 MG/DL (ref 0–100)
POTASSIUM SERPL-SCNC: 3.7 MMOL/L (ref 3.5–5.2)
PROT SERPL-MCNC: 6.4 G/DL (ref 6–8.5)
SODIUM SERPL-SCNC: 145 MMOL/L (ref 136–145)
TRIGL SERPL-MCNC: 174 MG/DL (ref 0–150)
VLDLC SERPL CALC-MCNC: 34 MG/DL (ref 5–40)

## 2022-10-06 ENCOUNTER — OFFICE VISIT (OUTPATIENT)
Dept: FAMILY MEDICINE CLINIC | Facility: CLINIC | Age: 87
End: 2022-10-06

## 2022-10-06 VITALS
SYSTOLIC BLOOD PRESSURE: 162 MMHG | DIASTOLIC BLOOD PRESSURE: 80 MMHG | BODY MASS INDEX: 23 KG/M2 | OXYGEN SATURATION: 96 % | HEIGHT: 62 IN | HEART RATE: 74 BPM | TEMPERATURE: 96.6 F | WEIGHT: 125 LBS

## 2022-10-06 DIAGNOSIS — Z00.00 MEDICARE ANNUAL WELLNESS VISIT, SUBSEQUENT: Primary | ICD-10-CM

## 2022-10-06 PROCEDURE — 1160F RVW MEDS BY RX/DR IN RCRD: CPT | Performed by: FAMILY MEDICINE

## 2022-10-06 PROCEDURE — 1170F FXNL STATUS ASSESSED: CPT | Performed by: FAMILY MEDICINE

## 2022-10-06 PROCEDURE — G0439 PPPS, SUBSEQ VISIT: HCPCS | Performed by: FAMILY MEDICINE

## 2022-10-06 PROCEDURE — 0124A PR ADM SARSCOV2 30MCG/0.3ML BST: CPT | Performed by: FAMILY MEDICINE

## 2022-10-06 PROCEDURE — 91312 COVID-19 (PFIZER) BIVALENT BOOSTER 12+YRS: CPT | Performed by: FAMILY MEDICINE

## 2022-10-06 NOTE — PROGRESS NOTES
The ABCs of the Annual Wellness Visit  Subsequent Medicare Wellness Visit    Chief Complaint   Patient presents with   • Medicare Wellness-subsequent      Subjective    History of Present Illness:  Charo Thomas is a 89 y.o. female who presents for a Subsequent Medicare Wellness Visit.    Here for Medicare wellness visit only.  Her blood pressure has been up a little bit at home since stopping the triamterene hydrochlorothiazide.  She was having hypotensive symptomatology.  They think it is running in the 140s range but I do not know the exact numbers.  She is having no cardiovascular symptoms.  She did have some recurrent positional vertigo recently, her physical therapist did an Epley maneuver and improved it.  She was admitted a few months ago with dizziness, thought related to benign positional vertigo.  Her central vertigo work-up was negative.  She otherwise doing well.  She continues on chronic benzodiazepine use and lansoprazole.    The following portions of the patient's history were reviewed and   updated as appropriate: allergies, current medications, past family history, past medical history, past social history, past surgical history and problem list.    Compared to one year ago, the patient feels her physical   health is the same.    Compared to one year ago, the patient feels her mental   health is the same.    Recent Hospitalizations:  This patient has had a [facility] admission record on file within the last 365 days.    Current Medical Providers:  Patient Care Team:  Gal Ortega MD as PCP - General (Family Medicine)  Raul Macdonald MD as PCP - Family Medicine  Lalo Edmond MD as Consulting Physician (Hematology and Oncology)  Marvin Tello MD as Referring Physician (Breast Surgery)    Outpatient Medications Prior to Visit   Medication Sig Dispense Refill   • B Complex Vitamins (VITAMIN-B COMPLEX PO) Take  by mouth daily.     • lansoprazole (PREVACID) 30 MG capsule Take 1  "capsule by mouth once daily 90 capsule 1   • LORazepam (ATIVAN) 0.5 MG tablet Take 1/2 (one-half) tablet by mouth twice daily 60 tablet 0   • Cholecalciferol (Vitamin D3) 1.25 MG (26906 UT) tablet Take  by mouth.     • Diclofenac Sodium (VOLTAREN) 1 % gel gel Apply 4 g topically to the appropriate area as directed 4 (Four) Times a Day As Needed.       No facility-administered medications prior to visit.       No opioid medication identified on active medication list. I have reviewed chart for other potential  high risk medication/s and harmful drug interactions in the elderly.          Aspirin is not on active medication list.  Aspirin use is not indicated based on review of current medical condition/s. Risk of harm outweighs potential benefits.  .    Patient Active Problem List   Diagnosis   • Anxiety   • Benzodiazepine dependence (HCC)   • Generalized anxiety disorder   • Gastroesophageal reflux disease   • Essential hypertension   • Peripheral neuropathy   • Acoustic neuroma (HCC)   • Malignant neoplasm of overlapping sites of right breast in female, estrogen receptor positive (HCC)   • Vitamin B12 deficiency   • Radial artery aneurysm, left (HCC)   • Primary osteoarthritis of left knee   • Dizzy spells   • Dizziness   • Orthostasis     Advance Care Planning  Advance Directive is on file.  ACP discussion was held with the patient during this visit. Patient has an advance directive in EMR which is still valid.           Objective    Vitals:    10/06/22 1121   BP: 162/80   Pulse: 74   Temp: 96.6 °F (35.9 °C)   TempSrc: Temporal   SpO2: 96%   Weight: 56.7 kg (125 lb)   Height: 158 cm (62.21\")     Estimated body mass index is 22.71 kg/m² as calculated from the following:    Height as of this encounter: 158 cm (62.21\").    Weight as of this encounter: 56.7 kg (125 lb).    BMI is within normal parameters. No other follow-up for BMI required.      Does the patient have evidence of cognitive impairment? No    Physical " Exam  Vitals and nursing note reviewed.   Constitutional:       General: She is not in acute distress.     Appearance: She is well-developed.   Cardiovascular:      Rate and Rhythm: Normal rate and regular rhythm.      Heart sounds: Normal heart sounds.   Pulmonary:      Effort: Pulmonary effort is normal.      Breath sounds: Normal breath sounds.   Musculoskeletal:      Cervical back: Normal range of motion.   Skin:     General: Skin is warm and dry.   Psychiatric:         Mood and Affect: Mood normal.       Lab Results   Component Value Date    CHLPL 321 (H) 09/29/2022    TRIG 174 (H) 09/29/2022    TRIG 169 (H) 08/22/2022    HDL 51 09/29/2022    HDL 53 08/22/2022     (H) 09/29/2022    VLDL 34 09/29/2022    HGBA1C 5.50 08/22/2022            HEALTH RISK ASSESSMENT    Smoking Status:  Social History     Tobacco Use   Smoking Status Never Smoker   Smokeless Tobacco Never Used     Alcohol Consumption:  Social History     Substance and Sexual Activity   Alcohol Use No     Fall Risk Screen:    STEADI Fall Risk Assessment was completed, and patient is at LOW risk for falls.Assessment completed on:4/4/2022    Depression Screening:  PHQ-2/PHQ-9 Depression Screening 4/4/2022   Retired PHQ-9 Total Score -   Retired Total Score -   Little Interest or Pleasure in Doing Things 0-->not at all   Feeling Down, Depressed or Hopeless 0-->not at all   PHQ-9: Brief Depression Severity Measure Score 0       Health Habits and Functional and Cognitive Screening:  Functional & Cognitive Status 10/6/2022   Do you have difficulty preparing food and eating? No   Do you have difficulty bathing yourself, getting dressed or grooming yourself? No   Do you have difficulty using the toilet? Yes   Do you have difficulty moving around from place to place? No   Do you have trouble with steps or getting out of a bed or a chair? Yes   Current Diet Well Balanced Diet   Dental Exam Up to date   Eye Exam Up to date   Exercise (times per week) -    Current Exercises Include Walking;Other        Exercise Comment PHYSICAL THERAPY   Current Exercise Activities Include -   Do you need help using the phone?  No   Are you deaf or do you have serious difficulty hearing?  No   Do you need help with transportation? Yes   Do you need help shopping? No   Do you need help preparing meals?  No   Do you need help with housework?  No   Do you need help with laundry? No   Do you need help taking your medications? No   Do you need help managing money? No   Do you ever drive or ride in a car without wearing a seat belt? No   Have you felt unusual stress, anger or loneliness in the last month? Yes   Who do you live with? Alone   If you need help, do you have trouble finding someone available to you? No   Have you been bothered in the last four weeks by sexual problems? No   Do you have difficulty concentrating, remembering or making decisions? No       Age-appropriate Screening Schedule:  Refer to the list below for future screening recommendations based on patient's age, sex and/or medical conditions. Orders for these recommended tests are listed in the plan section. The patient has been provided with a written plan.    Health Maintenance   Topic Date Due   • TDAP/TD VACCINES (1 - Tdap) Never done   • ZOSTER VACCINE (1 of 2) Never done   • DXA SCAN  08/20/2015   • MAMMOGRAM  07/20/2019   • INFLUENZA VACCINE  Never done   • LIPID PANEL  09/29/2023              Assessment & Plan   CMS Preventative Services Quick Reference  Risk Factors Identified During Encounter  Fall Risk-High or Moderate  Immunizations Discussed/Encouraged (specific Immunizations; COVID19  The above risks/problems have been discussed with the patient.  Follow up actions/plans if indicated are seen below in the Assessment/Plan Section.  Pertinent information has been shared with the patient in the After Visit Summary.    Diagnoses and all orders for this visit:    1. Medicare annual wellness visit, subsequent  (Primary)    Other orders  -     COVID-19 Bivalent Booster (Pfizer) 12+yrs      Here for annual Medicare wellness visit only.  They are going to continue to monitor blood pressure readings and send me more in November.  May need to go on some low-dose amlodipine.  Holding off further diuretics.  With regards to her positional vertigo, with recurrence I want her to follow-up with her physical therapist.  With severe symptoms they will let us know.  Otherwise I will see her back in 6 months for recheck with lab work prior.    Chronic benzodiazepine use.  Attempts to wean have not been fruitful.  Benefits and risks are likely equal at this time.      Follow Up:   No follow-ups on file.     An After Visit Summary and PPPS were made available to the patient.

## 2022-10-17 DIAGNOSIS — F13.20 BENZODIAZEPINE DEPENDENCE: ICD-10-CM

## 2022-10-17 RX ORDER — LORAZEPAM 0.5 MG/1
TABLET ORAL
Qty: 60 TABLET | Refills: 1 | Status: ON HOLD | OUTPATIENT
Start: 2022-10-17 | End: 2022-11-15 | Stop reason: SDUPTHER

## 2022-10-17 NOTE — TELEPHONE ENCOUNTER
Rx Refill Note  Requested Prescriptions     Pending Prescriptions Disp Refills   • LORazepam (ATIVAN) 0.5 MG tablet [Pharmacy Med Name: LORazepam 0.5 MG Oral Tablet] 60 tablet 1     Sig: Take 1/2 (one-half) tablet by mouth twice daily      Last office visit with prescribing clinician: 10/6/2022      Next office visit with prescribing clinician: Visit date not found            Murali Sparrow  10/17/22, 10:02 EDT

## 2022-10-26 ENCOUNTER — TELEPHONE (OUTPATIENT)
Dept: ORTHOPEDIC SURGERY | Facility: CLINIC | Age: 87
End: 2022-10-26

## 2022-10-26 NOTE — TELEPHONE ENCOUNTER
Caller: DA JIM     Relationship to patient: DAUGHTER     Best call back number:[phone]    Chief complaint: ARM PAIN AND INCREASED LEFT LEG PAIN     Additional notes:PATIENTS DAUGHTER WANTED TO GET A RECOMMENDATION ON WHO WOULD BE BEST TO TREAT THE PATIENT BASED OFF THE PATIENTS HISTORY AND ALSO LIKE TO KNOW IF INJECTIONS ARE A OPTION FOR THE LEG PAIN

## 2022-10-27 NOTE — TELEPHONE ENCOUNTER
Called and spoke to patients daughter. Made her an appt for her knee pain, she stated her shoulder is giving her problems to and to get her here to see 2 different providers on 2 different days is difficult. So I put a note on the appt line about her shoulder since she is 89 and RBB can decide if he wants to see her for that also as insurance will not pay for 2 visits from 2 different providers in the same day.

## 2022-11-08 ENCOUNTER — OFFICE VISIT (OUTPATIENT)
Dept: ORTHOPEDIC SURGERY | Facility: CLINIC | Age: 87
End: 2022-11-08

## 2022-11-08 VITALS — WEIGHT: 124 LBS | RESPIRATION RATE: 14 BRPM | HEIGHT: 62 IN | BODY MASS INDEX: 22.82 KG/M2 | TEMPERATURE: 97.4 F

## 2022-11-08 DIAGNOSIS — M16.12 PRIMARY OSTEOARTHRITIS OF LEFT HIP: ICD-10-CM

## 2022-11-08 DIAGNOSIS — M25.511 CHRONIC RIGHT SHOULDER PAIN: ICD-10-CM

## 2022-11-08 DIAGNOSIS — G89.29 CHRONIC RIGHT SHOULDER PAIN: ICD-10-CM

## 2022-11-08 DIAGNOSIS — R52 PAIN: Primary | ICD-10-CM

## 2022-11-08 PROCEDURE — 73560 X-RAY EXAM OF KNEE 1 OR 2: CPT | Performed by: ORTHOPAEDIC SURGERY

## 2022-11-08 PROCEDURE — 99213 OFFICE O/P EST LOW 20 MIN: CPT | Performed by: ORTHOPAEDIC SURGERY

## 2022-11-08 NOTE — PROGRESS NOTES
"Patient: Charo Thomas  YOB: 1929 89 y.o. female  Medical Record Number: [id number]    Chief Complaint:   Chief Complaint   Patient presents with   • Left Knee - Pain, Initial Evaluation   • Right Knee - Pain, Initial Evaluation       History of Present Illness:Charo Thomas is a 89 y.o. female who presents for follow-up of multiple issues she has (right hip pain bilateral thigh pain some knee pain right shoulder pain and stiffness.  She is now 89 and has known arthritis throughout.  She uses a walker.  She has severe hip arthritis on the left side but has refused hip replacement in the past    Allergies:   Allergies   Allergen Reactions   • Cortisone    • Diphenhydramine Hcl (Sleep)    • Epinephrine    • Penicillins    • Sulfa Antibiotics        Medications:   Current Outpatient Medications   Medication Sig Dispense Refill   • B Complex Vitamins (VITAMIN-B COMPLEX PO) Take  by mouth daily.     • lansoprazole (PREVACID) 30 MG capsule Take 1 capsule by mouth once daily 90 capsule 1   • LORazepam (ATIVAN) 0.5 MG tablet Take 1/2 (one-half) tablet by mouth twice daily 60 tablet 1     No current facility-administered medications for this visit.         The following portions of the patient's history were reviewed and updated as appropriate: allergies, current medications, past family history, past medical history, past social history, past surgical history and problem list.    Review of Systems:   A 14 point review of systems was performed. All systems negative except pertinent positives/negative listed in HPI above    Physical Exam:   Vitals:    11/08/22 1419   Resp: 14   Temp: 97.4 °F (36.3 °C)   Weight: 56.2 kg (124 lb)   Height: 158 cm (62.21\")       General: A and O x 3, ASA, NAD    SCLERA:    Normal    DENTITION:   Normal  Hip:  left    LEG ALIGNMENT:     Neutral        LEG LENGTH DISCREPANCY   :    none    GAIT:     Antalgic    SKIN:     No abnormality    RANGE OF MOTION:     Limited by joint " irritability    STRENGTH:     Limited by joint irratibility    DISTAL PULSES:    Paplable    DISTAL SENSATION :   Intact    LYMPHATICS:     No   lymphadenopathy    OTHER:          +   Stinchfeld test      -    log roll      -   Tenderness to palpation trochanteric bursa     Right shoulder is very stiff there is irritability with Neer and Aquino impingement she only abducts to about 90 degrees and has roughly 20 degrees of external rotation  Radiology:    Xrays 2views both knees (AP bilateral knees as well as lateral and sunrise) ordered and reviewed for evaluation of knee pain demonstrating minimal arthritic findings  Comparison views: not available    Previous x-rays of both hips show severe advanced left hip osteoarthritis and moderate right hip osteoarthritis    Assessment/Plan:  Severe left greater than right hip osteoarthritis and bilateral knee mild arthritis.  I think most of her knee pain is likely from hip source she does not want to consider hip replacement given her advanced age which is reasonable.  I offered her a right shoulder injection for her osteoarthritis but she wants to hold off on that I will see her back as needed      Nam Pugh MD  11/8/2022

## 2022-11-12 ENCOUNTER — APPOINTMENT (OUTPATIENT)
Dept: CT IMAGING | Facility: HOSPITAL | Age: 87
End: 2022-11-12

## 2022-11-12 ENCOUNTER — APPOINTMENT (OUTPATIENT)
Dept: GENERAL RADIOLOGY | Facility: HOSPITAL | Age: 87
End: 2022-11-12

## 2022-11-12 ENCOUNTER — HOSPITAL ENCOUNTER (OUTPATIENT)
Facility: HOSPITAL | Age: 87
Setting detail: OBSERVATION
Discharge: NURSING, REHAB OR LONG-TERM CARE | End: 2022-11-15
Attending: EMERGENCY MEDICINE | Admitting: STUDENT IN AN ORGANIZED HEALTH CARE EDUCATION/TRAINING PROGRAM

## 2022-11-12 DIAGNOSIS — R55 SYNCOPE AND COLLAPSE: Primary | ICD-10-CM

## 2022-11-12 DIAGNOSIS — E86.0 ACUTE DEHYDRATION: ICD-10-CM

## 2022-11-12 DIAGNOSIS — F13.20 BENZODIAZEPINE DEPENDENCE: ICD-10-CM

## 2022-11-12 DIAGNOSIS — R41.0 CONFUSION: ICD-10-CM

## 2022-11-12 LAB
ALBUMIN SERPL-MCNC: 4.7 G/DL (ref 3.5–5.2)
ALBUMIN/GLOB SERPL: 1.9 G/DL
ALP SERPL-CCNC: 95 U/L (ref 39–117)
ALT SERPL W P-5'-P-CCNC: 9 U/L (ref 1–33)
ANION GAP SERPL CALCULATED.3IONS-SCNC: 14.5 MMOL/L (ref 5–15)
AST SERPL-CCNC: 22 U/L (ref 1–32)
BASOPHILS # BLD AUTO: 0.03 10*3/MM3 (ref 0–0.2)
BASOPHILS NFR BLD AUTO: 0.3 % (ref 0–1.5)
BILIRUB SERPL-MCNC: 1.2 MG/DL (ref 0–1.2)
BILIRUB UR QL STRIP: NEGATIVE
BUN SERPL-MCNC: 16 MG/DL (ref 8–23)
BUN/CREAT SERPL: 16 (ref 7–25)
CALCIUM SPEC-SCNC: 10.1 MG/DL (ref 8.2–9.6)
CHLORIDE SERPL-SCNC: 102 MMOL/L (ref 98–107)
CLARITY UR: CLEAR
CO2 SERPL-SCNC: 25.5 MMOL/L (ref 22–29)
COLOR UR: YELLOW
CREAT SERPL-MCNC: 1 MG/DL (ref 0.57–1)
DEPRECATED RDW RBC AUTO: 42.9 FL (ref 37–54)
EGFRCR SERPLBLD CKD-EPI 2021: 52.6 ML/MIN/1.73
EOSINOPHIL # BLD AUTO: 0 10*3/MM3 (ref 0–0.4)
EOSINOPHIL NFR BLD AUTO: 0 % (ref 0.3–6.2)
ERYTHROCYTE [DISTWIDTH] IN BLOOD BY AUTOMATED COUNT: 12.6 % (ref 12.3–15.4)
GLOBULIN UR ELPH-MCNC: 2.5 GM/DL
GLUCOSE BLDC GLUCOMTR-MCNC: 105 MG/DL (ref 70–130)
GLUCOSE BLDC GLUCOMTR-MCNC: 120 MG/DL (ref 70–130)
GLUCOSE SERPL-MCNC: 128 MG/DL (ref 65–99)
GLUCOSE UR STRIP-MCNC: NEGATIVE MG/DL
HCT VFR BLD AUTO: 42.5 % (ref 34–46.6)
HGB BLD-MCNC: 14.3 G/DL (ref 12–15.9)
HGB UR QL STRIP.AUTO: NEGATIVE
HOLD SPECIMEN: NORMAL
HOLD SPECIMEN: NORMAL
IMM GRANULOCYTES # BLD AUTO: 0.03 10*3/MM3 (ref 0–0.05)
IMM GRANULOCYTES NFR BLD AUTO: 0.3 % (ref 0–0.5)
KETONES UR QL STRIP: ABNORMAL
LEUKOCYTE ESTERASE UR QL STRIP.AUTO: NEGATIVE
LYMPHOCYTES # BLD AUTO: 0.92 10*3/MM3 (ref 0.7–3.1)
LYMPHOCYTES NFR BLD AUTO: 8.8 % (ref 19.6–45.3)
MAGNESIUM SERPL-MCNC: 1.8 MG/DL (ref 1.7–2.3)
MCH RBC QN AUTO: 31.2 PG (ref 26.6–33)
MCHC RBC AUTO-ENTMCNC: 33.6 G/DL (ref 31.5–35.7)
MCV RBC AUTO: 92.8 FL (ref 79–97)
MONOCYTES # BLD AUTO: 0.31 10*3/MM3 (ref 0.1–0.9)
MONOCYTES NFR BLD AUTO: 3 % (ref 5–12)
NEUTROPHILS NFR BLD AUTO: 87.6 % (ref 42.7–76)
NEUTROPHILS NFR BLD AUTO: 9.16 10*3/MM3 (ref 1.7–7)
NITRITE UR QL STRIP: NEGATIVE
NRBC BLD AUTO-RTO: 0 /100 WBC (ref 0–0.2)
PH UR STRIP.AUTO: 7.5 [PH] (ref 5–8)
PLATELET # BLD AUTO: 241 10*3/MM3 (ref 140–450)
PMV BLD AUTO: 9.6 FL (ref 6–12)
POTASSIUM SERPL-SCNC: 3.5 MMOL/L (ref 3.5–5.2)
PROT SERPL-MCNC: 7.2 G/DL (ref 6–8.5)
PROT UR QL STRIP: ABNORMAL
RBC # BLD AUTO: 4.58 10*6/MM3 (ref 3.77–5.28)
SODIUM SERPL-SCNC: 142 MMOL/L (ref 136–145)
SP GR UR STRIP: 1.01 (ref 1–1.03)
TROPONIN T SERPL-MCNC: <0.01 NG/ML (ref 0–0.03)
UROBILINOGEN UR QL STRIP: ABNORMAL
WBC NRBC COR # BLD: 10.45 10*3/MM3 (ref 3.4–10.8)
WHOLE BLOOD HOLD COAG: NORMAL
WHOLE BLOOD HOLD SPECIMEN: NORMAL

## 2022-11-12 PROCEDURE — 85025 COMPLETE CBC W/AUTO DIFF WBC: CPT | Performed by: EMERGENCY MEDICINE

## 2022-11-12 PROCEDURE — 82962 GLUCOSE BLOOD TEST: CPT

## 2022-11-12 PROCEDURE — 84484 ASSAY OF TROPONIN QUANT: CPT | Performed by: EMERGENCY MEDICINE

## 2022-11-12 PROCEDURE — 80053 COMPREHEN METABOLIC PANEL: CPT | Performed by: EMERGENCY MEDICINE

## 2022-11-12 PROCEDURE — G0378 HOSPITAL OBSERVATION PER HR: HCPCS

## 2022-11-12 PROCEDURE — 70498 CT ANGIOGRAPHY NECK: CPT

## 2022-11-12 PROCEDURE — 82607 VITAMIN B-12: CPT | Performed by: NURSE PRACTITIONER

## 2022-11-12 PROCEDURE — 93005 ELECTROCARDIOGRAM TRACING: CPT | Performed by: EMERGENCY MEDICINE

## 2022-11-12 PROCEDURE — 0 IOPAMIDOL PER 1 ML: Performed by: EMERGENCY MEDICINE

## 2022-11-12 PROCEDURE — 71045 X-RAY EXAM CHEST 1 VIEW: CPT

## 2022-11-12 PROCEDURE — 86592 SYPHILIS TEST NON-TREP QUAL: CPT | Performed by: NURSE PRACTITIONER

## 2022-11-12 PROCEDURE — 99285 EMERGENCY DEPT VISIT HI MDM: CPT

## 2022-11-12 PROCEDURE — 93010 ELECTROCARDIOGRAM REPORT: CPT | Performed by: INTERNAL MEDICINE

## 2022-11-12 PROCEDURE — 83735 ASSAY OF MAGNESIUM: CPT | Performed by: EMERGENCY MEDICINE

## 2022-11-12 PROCEDURE — 70450 CT HEAD/BRAIN W/O DYE: CPT

## 2022-11-12 PROCEDURE — 70496 CT ANGIOGRAPHY HEAD: CPT

## 2022-11-12 PROCEDURE — 81003 URINALYSIS AUTO W/O SCOPE: CPT | Performed by: EMERGENCY MEDICINE

## 2022-11-12 RX ORDER — ACETAMINOPHEN 325 MG/1
650 TABLET ORAL EVERY 4 HOURS PRN
Status: DISCONTINUED | OUTPATIENT
Start: 2022-11-12 | End: 2022-11-15 | Stop reason: HOSPADM

## 2022-11-12 RX ORDER — PANTOPRAZOLE SODIUM 40 MG/1
40 TABLET, DELAYED RELEASE ORAL EVERY MORNING
Status: DISCONTINUED | OUTPATIENT
Start: 2022-11-13 | End: 2022-11-15 | Stop reason: HOSPADM

## 2022-11-12 RX ORDER — SODIUM CHLORIDE 0.9 % (FLUSH) 0.9 %
10 SYRINGE (ML) INJECTION AS NEEDED
Status: DISCONTINUED | OUTPATIENT
Start: 2022-11-12 | End: 2022-11-15 | Stop reason: HOSPADM

## 2022-11-12 RX ORDER — ONDANSETRON 2 MG/ML
4 INJECTION INTRAMUSCULAR; INTRAVENOUS EVERY 6 HOURS PRN
Status: DISCONTINUED | OUTPATIENT
Start: 2022-11-12 | End: 2022-11-15 | Stop reason: HOSPADM

## 2022-11-12 RX ORDER — ACETAMINOPHEN 650 MG/1
650 SUPPOSITORY RECTAL EVERY 4 HOURS PRN
Status: DISCONTINUED | OUTPATIENT
Start: 2022-11-12 | End: 2022-11-15 | Stop reason: HOSPADM

## 2022-11-12 RX ORDER — ATORVASTATIN CALCIUM 80 MG/1
80 TABLET, FILM COATED ORAL NIGHTLY
Status: DISCONTINUED | OUTPATIENT
Start: 2022-11-12 | End: 2022-11-13

## 2022-11-12 RX ORDER — SODIUM CHLORIDE 9 MG/ML
75 INJECTION, SOLUTION INTRAVENOUS CONTINUOUS
Status: DISCONTINUED | OUTPATIENT
Start: 2022-11-12 | End: 2022-11-13

## 2022-11-12 RX ORDER — ASPIRIN 325 MG
325 TABLET ORAL DAILY
Status: DISCONTINUED | OUTPATIENT
Start: 2022-11-12 | End: 2022-11-13

## 2022-11-12 RX ORDER — ASPIRIN 300 MG/1
300 SUPPOSITORY RECTAL DAILY
Status: DISCONTINUED | OUTPATIENT
Start: 2022-11-12 | End: 2022-11-13

## 2022-11-12 RX ORDER — LORAZEPAM 0.5 MG/1
0.25 TABLET ORAL 2 TIMES DAILY
Status: DISCONTINUED | OUTPATIENT
Start: 2022-11-12 | End: 2022-11-15 | Stop reason: HOSPADM

## 2022-11-12 RX ORDER — BISACODYL 10 MG
10 SUPPOSITORY, RECTAL RECTAL DAILY PRN
Status: DISCONTINUED | OUTPATIENT
Start: 2022-11-12 | End: 2022-11-15 | Stop reason: HOSPADM

## 2022-11-12 RX ADMIN — SODIUM CHLORIDE, POTASSIUM CHLORIDE, SODIUM LACTATE AND CALCIUM CHLORIDE 500 ML: 600; 310; 30; 20 INJECTION, SOLUTION INTRAVENOUS at 13:51

## 2022-11-12 RX ADMIN — LORAZEPAM 0.25 MG: 0.5 TABLET ORAL at 23:11

## 2022-11-12 RX ADMIN — IOPAMIDOL 95 ML: 755 INJECTION, SOLUTION INTRAVENOUS at 13:27

## 2022-11-12 RX ADMIN — ASPIRIN 325 MG: 325 TABLET ORAL at 21:51

## 2022-11-12 RX ADMIN — SODIUM CHLORIDE 75 ML/HR: 9 INJECTION, SOLUTION INTRAVENOUS at 23:57

## 2022-11-12 RX ADMIN — ATORVASTATIN CALCIUM 80 MG: 80 TABLET, FILM COATED ORAL at 21:51

## 2022-11-12 NOTE — ED PROVIDER NOTES
EMERGENCY DEPARTMENT ENCOUNTER    Room Number: 26/26  Date seen: 11/12/2022  Time seen: 11:26 EST  PCP: Gal Ortega MD    Spoken Language:  English  Language interpretation services not needed     CHIEF COMPLAINT: altered mental status    HPI: Charo Thomas is a 89 y.o. female with PMH of HTN, HLD, breast cancer presenting to the ED for evaluation of altered mental status. The history is being obtained by the patient, her daughter and by review of the medical chart.  The patient's daughter states that the patient has had intermittent confusion and difficulty speaking over the past 2 days.  She states that this morning the patient had difficulty speaking, with have closed and had urinated in her bed.  She states that the patient then went to sit back down into the soiled bed, but then slid off of the bed.  The patient's daughter was able to help the patient to the floor without injury.  She did not hit her head.  The patient is also complained of feeling nauseous and had generalized weakness for the past 2 days.  She denies fever, cough, sore throat, chest pain, shortness of breath, abdominal pain, vomiting or diarrhea.    MEDICAL RECORD REVIEW:  Reviewed in [facility].     PAST MEDICAL HISTORY  Past Medical History:   Diagnosis Date   • Benign esophageal stricture    • Breast cancer (HCC) 06/30/2010    Right breast w/papillary features, primary tumor size 1.9 cm w/negative margins   • Breast cancer in male, right 2013    Right breast, stage I, invasive   • Diverticular disease of colon    • H. pylori infection    • H/O Migraines    • Hip arthrosis    • Hyperlipidemia    • Hyperplastic polyps of stomach    • Hypertension    • Knee swelling 1995   • Osteoporosis    • Psychiatric disorder    • Vitamin B12 deficiency        PAST SURGICAL HISTORY  Past Surgical History:   Procedure Laterality Date   • APPENDECTOMY  1944   • BREAST BIOPSY Right 2010   • BREAST LUMPECTOMY     • BREAST LUMPECTOMY WITH SENTINEL NODE BIOPSY  Right 2013   • COLONOSCOPY      Diverticular disease   • DILATATION AND CURETTAGE  1957, 1971    x2       FAMILY HISTORY  Family History   Problem Relation Age of Onset   • Dementia Mother    • Cancer Mother 81        Breast removed   • Cancer Father 69        Throat; smoker   • Cancer Maternal Aunt         2 aunts at age 49 and 70; deaths unrelated to cancer   • Other Son         Brain tumor   • Hypertension Son        SOCIAL HISTORY  Social History     Socioeconomic History   • Marital status:      Spouse name: Marvin   • Number of children: 1   • Years of education: High School   Tobacco Use   • Smoking status: Never   • Smokeless tobacco: Never   Vaping Use   • Vaping Use: Never used   Substance and Sexual Activity   • Alcohol use: No   • Drug use: No   • Sexual activity: Never       CURRENT MEDICATIONS  Prior to Admission medications    Medication Sig Start Date End Date Taking? Authorizing Provider   lansoprazole (PREVACID) 30 MG capsule Take 1 capsule by mouth once daily 8/9/22  Yes Gal Ortega MD   LORazepam (ATIVAN) 0.5 MG tablet Take 1/2 (one-half) tablet by mouth twice daily 10/17/22  Yes Gal Ortega MD   B Complex Vitamins (VITAMIN-B COMPLEX PO) Take  by mouth daily.    Provider, MD Gus       ALLERGIES  Cortisone, Diphenhydramine hcl (sleep), Epinephrine, Penicillins, and Sulfa antibiotics    REVIEW OF SYSTEMS  All systems reviewed and negative except for those discussed in HPI.     PHYSICAL EXAM  ED Triage Vitals   Temp Heart Rate Resp BP SpO2   11/12/22 1015 11/12/22 1015 11/12/22 1015 11/12/22 1015 11/12/22 1015   97.3 °F (36.3 °C) 84 14 165/92 98 %      Temp src Heart Rate Source Patient Position BP Location FiO2 (%)   11/12/22 1015 11/12/22 1123 11/12/22 1123 11/12/22 1123 --   Tympanic Monitor Sitting Right arm        Physical Exam  Constitutional:       Appearance: Normal appearance.   HENT:      Head: Normocephalic and atraumatic.      Mouth/Throat:      Mouth: Mucous  membranes are moist.   Eyes:      Extraocular Movements: Extraocular movements intact.      Pupils: Pupils are equal, round, and reactive to light.   Cardiovascular:      Rate and Rhythm: Normal rate and regular rhythm.   Pulmonary:      Effort: Pulmonary effort is normal.      Breath sounds: Normal breath sounds.   Abdominal:      General: There is no distension.      Palpations: Abdomen is soft.      Tenderness: There is no abdominal tenderness.   Musculoskeletal:      Cervical back: Normal range of motion.   Skin:     General: Skin is warm and dry.   Neurological:      General: No focal deficit present.      Mental Status: She is alert and oriented to person, place, and time.   Psychiatric:         Mood and Affect: Mood normal.         Behavior: Behavior normal.         Thought Content: Thought content normal.         Judgment: Judgment normal.         PROCEDURES  Procedures  None    LABS  Recent Results (from the past 24 hour(s))   ECG 12 Lead ED Triage Standing Order; Weak / Dizzy / AMS    Collection Time: 11/12/22 11:29 AM   Result Value Ref Range    QT Interval 405 ms   Comprehensive Metabolic Panel    Collection Time: 11/12/22 11:39 AM    Specimen: Blood   Result Value Ref Range    Glucose 128 (H) 65 - 99 mg/dL    BUN 16 8 - 23 mg/dL    Creatinine 1.00 0.57 - 1.00 mg/dL    Sodium 142 136 - 145 mmol/L    Potassium 3.5 3.5 - 5.2 mmol/L    Chloride 102 98 - 107 mmol/L    CO2 25.5 22.0 - 29.0 mmol/L    Calcium 10.1 (H) 8.2 - 9.6 mg/dL    Total Protein 7.2 6.0 - 8.5 g/dL    Albumin 4.70 3.50 - 5.20 g/dL    ALT (SGPT) 9 1 - 33 U/L    AST (SGOT) 22 1 - 32 U/L    Alkaline Phosphatase 95 39 - 117 U/L    Total Bilirubin 1.2 0.0 - 1.2 mg/dL    Globulin 2.5 gm/dL    A/G Ratio 1.9 g/dL    BUN/Creatinine Ratio 16.0 7.0 - 25.0    Anion Gap 14.5 5.0 - 15.0 mmol/L    eGFR 52.6 (L) >60.0 mL/min/1.73   Troponin    Collection Time: 11/12/22 11:39 AM    Specimen: Blood   Result Value Ref Range    Troponin T <0.010 0.000 - 0.030  ng/mL   Magnesium    Collection Time: 11/12/22 11:39 AM    Specimen: Blood   Result Value Ref Range    Magnesium 1.8 1.7 - 2.3 mg/dL   Green Top (Gel)    Collection Time: 11/12/22 11:39 AM   Result Value Ref Range    Extra Tube Hold for add-ons.    Lavender Top    Collection Time: 11/12/22 11:39 AM   Result Value Ref Range    Extra Tube hold for add-on    Gold Top - SST    Collection Time: 11/12/22 11:39 AM   Result Value Ref Range    Extra Tube Hold for add-ons.    Light Blue Top    Collection Time: 11/12/22 11:39 AM   Result Value Ref Range    Extra Tube Hold for add-ons.    CBC Auto Differential    Collection Time: 11/12/22 11:39 AM    Specimen: Blood   Result Value Ref Range    WBC 10.45 3.40 - 10.80 10*3/mm3    RBC 4.58 3.77 - 5.28 10*6/mm3    Hemoglobin 14.3 12.0 - 15.9 g/dL    Hematocrit 42.5 34.0 - 46.6 %    MCV 92.8 79.0 - 97.0 fL    MCH 31.2 26.6 - 33.0 pg    MCHC 33.6 31.5 - 35.7 g/dL    RDW 12.6 12.3 - 15.4 %    RDW-SD 42.9 37.0 - 54.0 fl    MPV 9.6 6.0 - 12.0 fL    Platelets 241 140 - 450 10*3/mm3    Neutrophil % 87.6 (H) 42.7 - 76.0 %    Lymphocyte % 8.8 (L) 19.6 - 45.3 %    Monocyte % 3.0 (L) 5.0 - 12.0 %    Eosinophil % 0.0 (L) 0.3 - 6.2 %    Basophil % 0.3 0.0 - 1.5 %    Immature Grans % 0.3 0.0 - 0.5 %    Neutrophils, Absolute 9.16 (H) 1.70 - 7.00 10*3/mm3    Lymphocytes, Absolute 0.92 0.70 - 3.10 10*3/mm3    Monocytes, Absolute 0.31 0.10 - 0.90 10*3/mm3    Eosinophils, Absolute 0.00 0.00 - 0.40 10*3/mm3    Basophils, Absolute 0.03 0.00 - 0.20 10*3/mm3    Immature Grans, Absolute 0.03 0.00 - 0.05 10*3/mm3    nRBC 0.0 0.0 - 0.2 /100 WBC   POC Glucose Once    Collection Time: 11/12/22 12:45 PM    Specimen: Blood   Result Value Ref Range    Glucose 120 70 - 130 mg/dL   Urinalysis With Culture If Indicated - Urine, Random Void    Collection Time: 11/12/22  1:52 PM    Specimen: Urine, Random Void   Result Value Ref Range    Color, UA Yellow Yellow, Straw    Appearance, UA Clear Clear    pH, UA 7.5 5.0 -  8.0    Specific Gravity, UA 1.014 1.005 - 1.030    Glucose, UA Negative Negative    Ketones, UA 15 mg/dL (1+) (A) Negative    Bilirubin, UA Negative Negative    Blood, UA Negative Negative    Protein, UA Trace (A) Negative    Leuk Esterase, UA Negative Negative    Nitrite, UA Negative Negative    Urobilinogen, UA 0.2 E.U./dL 0.2 - 1.0 E.U./dL       RADIOLOGY  CT Angiogram Neck   Preliminary Result   1.  No finding of new stenosis or occlusion of the cervical or proximal   intracranial arterial vasculature. No intracranial aneurysm evident.   2.  Mild stenosis of the left vertebral artery at the approximate level   of C4, as before, favored to represent a partially calcified plaque. A   short dissection flap is also possible.              CT Angiogram Head   Preliminary Result   1.  No finding of new stenosis or occlusion of the cervical or proximal   intracranial arterial vasculature. No intracranial aneurysm evident.   2.  Mild stenosis of the left vertebral artery at the approximate level   of C4, as before, favored to represent a partially calcified plaque. A   short dissection flap is also possible.              CT Head Without Contrast   Final Result      XR Chest 1 View   Final Result          MEDICATIONS GIVEN IN THE ER  Medications   sodium chloride 0.9 % flush 10 mL (has no administration in time range)   lactated ringers bolus 500 mL (0 mL Intravenous Stopped 11/12/22 1419)   iopamidol (ISOVUE-370) 76 % injection 95 mL (95 mL Intravenous Given 11/12/22 1327)       MEDICAL DECISION MAKING, CONSULTS AND PROGRESS NOTES  All labs and all radiology studies were have been viewed and interpreted by me.   Discussion below represents my analysis of pertinent findings related to patient's condition, differential diagnosis, treatment plan and final disposition.    Differential diagnosis includes but is not limited to:  - vital sign abnormalities such as HTN encephalopathy, hypotension, hypoxemia, hypercarbia, heat  stroke  - toxic/metabolic pathology such as hypoglycemia, DKA, hypo/hyper-natremia, thyroid storm, myxedema coma, medication side effect (either intentional or accidental)  - infectious etiology  - intracranial pathology such as stroke, seizure, intracranial mass, intracranial hemorrhage  - psychiatric pathology    PPE: The patient was placed in a face mask in first look. Patient was wearing facemask when I entered the room and throughout our encounter. I wore full protective equipment throughout this patient encounter including a face mask, eye protection and gloves. Hand hygiene was performed before donning protective equipment and after removal when leaving the room.    AS OF 14:53 EST VITALS:    BP - 109/86  HR - 84  TEMP - 97.3 °F (36.3 °C) (Tympanic)  O2 SATS - 92%    ED Course as of 11/12/22 1453   Sat Nov 12, 2022   1232 I reviewed the EKG, rate of 81, no obvious ST elevations noted, similar to previous, poor baseline in aVL and aVF. [KS]   1240 I discussed the patient's overall care with Dr. Jaffe, neurologist on-call.  He recommends a CTA and admission to the hospitalist.  He also requested a brain MRI be performed when able. [AR]      ED Course User Index  [AR] Amber Clark PA  [KS] Montana Jaffe MD     Based on the patient's lab findings and presenting symptoms, the doctor and I feel it is appropriate to admit the patient for further management, evaluation, and treatment.  I have discussed this with the admitting team.  I have also discussed this with the patient/family.  They are in agreement with admission.      DIAGNOSIS   Diagnosis Plan   1. Syncope and collapse        2. Acute dehydration        3. Confusion            DISPOSITION  ED Disposition     ED Disposition   Decision to Admit    Condition   --    Comment   Level of Care: Telemetry [5]   Diagnosis: Syncope and collapse [780.2.ICD-9-CM]   Admitting Physician: ZOHAIB GALVAN [[id number]]   Attending Physician: ZOHAIB GALVAN  [[id number]]             RX  Medications   sodium chloride 0.9 % flush 10 mL (has no administration in time range)   lactated ringers bolus 500 mL (0 mL Intravenous Stopped 11/12/22 1419)   iopamidol (ISOVUE-370) 76 % injection 95 mL (95 mL Intravenous Given 11/12/22 1327)          Medication List      No changes were made to your prescriptions during this visit.       Provider Attestation:  I personally reviewed the past medical history, past surgical history, social history, family history, current medications and allergies as they appear in the chart. I reviewed the patient's history, physical, lab/imaging results and overall care with Dr. Jaffe who is in agreement with the patient's treatment plan.    EMR Dragon/Transcription disclaimer:  Dictated using Dragon dictation    Provider note signed by:         Amber Clark PA  11/12/22 3629

## 2022-11-12 NOTE — ED TRIAGE NOTES
From home (lives alone) with generalized weakness, nausea, confusion worsening x a couple of days.  Family states pt seems somewhat confused yesterday and is worse this morning.  -[place] stroke.  Pt wearing mask on arrival.  
How Severe Is Your Skin Lesion?: mild
Have Your Skin Lesions Been Treated?: not been treated
Is This A New Presentation, Or A Follow-Up?: Skin Lesions

## 2022-11-12 NOTE — ED NOTES
Nursing report ED to floor  Charo Thomas  89 y.o.  female    HPI :   Chief Complaint   Patient presents with    Altered Mental Status       Admitting doctor:   Mariaelena Thomas MD    Admitting diagnosis:   The primary encounter diagnosis was Syncope and collapse. Diagnoses of Acute dehydration and Confusion were also pertinent to this visit.    Code status:   Current Code Status       Date Active Code Status Order ID Comments User Context       Prior            Allergies:   Cortisone, Diphenhydramine hcl (sleep), Epinephrine, Penicillins, and Sulfa antibiotics    Isolation:   No active isolations    Intake and Output    Intake/Output Summary (Last 24 hours) at 11/12/2022 1603  Last data filed at 11/12/2022 1419  Gross per 24 hour   Intake 500 ml   Output --   Net 500 ml       Weight:       11/12/22  1123   Weight: 56.2 kg (124 lb)       Most recent vitals:   Vitals:    11/12/22 1325 11/12/22 1401 11/12/22 1501 11/12/22 1531   BP: (!) 173/118 109/86 141/73 152/96   BP Location:       Patient Position: Sitting      Pulse: 84 84 74 83   Resp:  14 14 16   Temp:       TempSrc:       SpO2:  92% 94% 96%   Weight:       Height:           Active LDAs/IV Access:   Lines, Drains & Airways       Active LDAs       Name Placement date Placement time Site Days    Peripheral IV 11/12/22 1139 Right Antecubital 11/12/22  1139  Antecubital  less than 1                    Labs (abnormal labs have a star):   Labs Reviewed   COMPREHENSIVE METABOLIC PANEL - Abnormal; Notable for the following components:       Result Value    Glucose 128 (*)     Calcium 10.1 (*)     eGFR 52.6 (*)     All other components within normal limits    Narrative:     GFR Normal >60  Chronic Kidney Disease <60  Kidney Failure <15    The GFR formula is only valid for adults with stable renal function between ages 18 and 70.   URINALYSIS W/ CULTURE IF INDICATED - Abnormal; Notable for the following components:    Ketones, UA 15 mg/dL (1+) (*)     Protein, UA  Trace (*)     All other components within normal limits    Narrative:     In absence of clinical symptoms, the presence of pyuria, bacteria, and/or nitrites on the urinalysis result does not correlate with infection.  Urine microscopic not indicated.   CBC WITH AUTO DIFFERENTIAL - Abnormal; Notable for the following components:    Neutrophil % 87.6 (*)     Lymphocyte % 8.8 (*)     Monocyte % 3.0 (*)     Eosinophil % 0.0 (*)     Neutrophils, Absolute 9.16 (*)     All other components within normal limits   TROPONIN (IN-HOUSE) - Normal    Narrative:     Troponin T Reference Range:  <= 0.03 ng/mL-   Negative for AMI  >0.03 ng/mL-     Abnormal for myocardial necrosis.  Clinicians would have to utilize clinical acumen, EKG, Troponin and serial changes to determine if it is an Acute Myocardial Infarction or myocardial injury due to an underlying chronic condition.       Results may be falsely decreased if patient taking Biotin.     MAGNESIUM - Normal   POCT GLUCOSE FINGERSTICK - Normal   RAINBOW DRAW    Narrative:     The following orders were created for panel order [place] Draw.  Procedure                               Abnormality         Status                     ---------                               -----------         ------                     Green Top (Gel)[[id number]]                                  Final result               Lavender Top[[id number]]                                     Final result               Gold Top - SST[[id number]]                                   Final result               Light Blue Top[[id number]]                                   Final result                 Please view results for these tests on the individual orders.   POCT GLUCOSE FINGERSTICK   CBC AND DIFFERENTIAL    Narrative:     The following orders were created for panel order CBC & Differential.  Procedure                               Abnormality         Status                     ---------                                -----------         ------                     CBC Auto Differential[[id number]]        Abnormal            Final result                 Please view results for these tests on the individual orders.   GREEN TOP   LAVENDER TOP   GOLD TOP - SST   LIGHT BLUE TOP       EKG:   ECG 12 Lead ED Triage Standing Order; Weak / Dizzy / AMS   Preliminary Result   HEART RATE= 81  bpm   RR Interval= 741  ms   VT Interval= 52  ms   P Horizontal Axis= 17  deg   P Front Axis= 0  deg   QRSD Interval= 86  ms   QT Interval= 405  ms   QRS Axis= -14  deg   T Wave Axis= 72  deg   - ABNORMAL ECG -   Sinus rhythm   Short VT interval   Nonspecific repol abnormality, lateral leads   Electronically Signed By:    Date and Time of Study: 2022-11-12 11:29:31          Meds given in ED:   Medications   sodium chloride 0.9 % flush 10 mL (has no administration in time range)   lactated ringers bolus 500 mL (0 mL Intravenous Stopped 11/12/22 1419)   iopamidol (ISOVUE-370) 76 % injection 95 mL (95 mL Intravenous Given 11/12/22 1327)       Imaging results:  CT Angiogram Neck    Result Date: 11/12/2022  1.  No finding of new stenosis or occlusion of the cervical or proximal intracranial arterial vasculature. No intracranial aneurysm evident. 2.  Mild stenosis of the left vertebral artery at the approximate level of C4, as before, favored to represent a partially calcified plaque. A short dissection flap is also possible.       CT Angiogram Head    Result Date: 11/12/2022  1.  No finding of new stenosis or occlusion of the cervical or proximal intracranial arterial vasculature. No intracranial aneurysm evident. 2.  Mild stenosis of the left vertebral artery at the approximate level of C4, as before, favored to represent a partially calcified plaque. A short dissection flap is also possible.        Ambulatory status:   Max assist-2 assist    Social issues:   Social History     Socioeconomic History    Marital status:      Spouse name: Marvin     Number of children: 1    Years of education: High School   Tobacco Use    Smoking status: Never    Smokeless tobacco: Never   Vaping Use    Vaping Use: Never used   Substance and Sexual Activity    Alcohol use: No    Drug use: No    Sexual activity: Never       NIH Stroke Scale:  Interval: baseline -2      Cathy Cooper RN  11/12/22 16:03 EST

## 2022-11-12 NOTE — ED PROVIDER NOTES
Subjective   History of Present Illness  89-year-old female with past medical history of breast cancer, migraines, hypokalemia here for chief complaint of confusion.  History was obtained from the patient and the daughter.  According them, patient has been confused since Thursday.  Patient's confusion improved yesterday.  This morning the patient was confused and urinary incontinent.  When the daughter went there the patient had a syncopal episode.  This concerned the daughter therefore the patient was brought to the ED.  Currently patient denies any headaches, neck pain, sore throat, vision changes, chest pain, shortness of breath, abdominal pain, nausea, vomiting, diarrhea, fevers, chills, or any other symptoms.  Patient states that her urinary frequency is not new for her.  The daughter states that the patient wants to be DNR and DNI.        Review of Systems   All other systems reviewed and are negative.      Past Medical History:   Diagnosis Date   • Benign esophageal stricture    • Breast cancer (HCC) 06/30/2010    Right breast w/papillary features, primary tumor size 1.9 cm w/negative margins   • Breast cancer in male, right 2013    Right breast, stage I, invasive   • Diverticular disease of colon    • H. pylori infection    • H/O Migraines    • Hip arthrosis    • Hyperlipidemia    • Hyperplastic polyps of stomach    • Hypertension    • Knee swelling 1995   • Osteoporosis    • Psychiatric disorder    • Vitamin B12 deficiency        Allergies   Allergen Reactions   • Cortisone    • Diphenhydramine Hcl (Sleep)    • Epinephrine    • Penicillins    • Sulfa Antibiotics        Past Surgical History:   Procedure Laterality Date   • APPENDECTOMY  1944   • BREAST BIOPSY Right 2010   • BREAST LUMPECTOMY     • BREAST LUMPECTOMY WITH SENTINEL NODE BIOPSY Right 2013   • COLONOSCOPY      Diverticular disease   • DILATATION AND CURETTAGE  1957, 1971    x2       Family History   Problem Relation Age of Onset   • Dementia  Mother    • Cancer Mother 81        Breast removed   • Cancer Father 69        Throat; smoker   • Cancer Maternal Aunt         2 aunts at age 49 and 70; deaths unrelated to cancer   • Other Son         Brain tumor   • Hypertension Son        Social History     Socioeconomic History   • Marital status:      Spouse name: Marvin   • Number of children: 1   • Years of education: High School   Tobacco Use   • Smoking status: Never   • Smokeless tobacco: Never   Vaping Use   • Vaping Use: Never used   Substance and Sexual Activity   • Alcohol use: No   • Drug use: No   • Sexual activity: Never           Objective   Physical Exam  Constitutional:       General: She is not in acute distress.     Appearance: She is not ill-appearing, toxic-appearing or diaphoretic.   HENT:      Head: Normocephalic and atraumatic.   Eyes:      General: No visual field deficit or scleral icterus.     Extraocular Movements: Extraocular movements intact.      Right eye: Normal extraocular motion and no nystagmus.      Left eye: Normal extraocular motion and no nystagmus.      Pupils: Pupils are equal, round, and reactive to light. Pupils are equal.      Right eye: Pupil is round and reactive.      Left eye: Pupil is round and reactive.   Cardiovascular:      Rate and Rhythm: Normal rate and regular rhythm.      Heart sounds: Normal heart sounds. No murmur heard.    No friction rub. No gallop.   Pulmonary:      Effort: Pulmonary effort is normal. No respiratory distress.      Breath sounds: Normal breath sounds. No stridor. No wheezing, rhonchi or rales.   Chest:      Chest wall: No tenderness.   Abdominal:      General: Bowel sounds are normal. There is no distension.      Palpations: Abdomen is soft. There is no mass.      Tenderness: There is no abdominal tenderness. There is no guarding.   Musculoskeletal:         General: No swelling or tenderness. Normal range of motion.      Cervical back: Normal range of motion and neck supple. No  rigidity.   Lymphadenopathy:      Cervical: No cervical adenopathy.   Skin:     General: Skin is warm and dry.      Capillary Refill: Capillary refill takes less than 2 seconds.      Coloration: Skin is not cyanotic or pale.      Findings: No erythema or rash.   Neurological:      Mental Status: She is alert.      GCS: GCS eye subscore is 4. GCS verbal subscore is 5. GCS motor subscore is 6.      Cranial Nerves: No cranial nerve deficit, dysarthria or facial asymmetry.      Sensory: No sensory deficit.      Motor: No weakness.      Coordination: Coordination normal.   Psychiatric:         Mood and Affect: Mood normal.         Behavior: Behavior normal. Behavior is not agitated.         Procedures           ED Course  ED Course as of 11/12/22 1455   Sat Nov 12, 2022   1232 I reviewed the EKG, rate of 81, no obvious ST elevations noted, similar to previous, poor baseline in aVL and aVF. [KS]   1240 I discussed the patient's overall care with Dr. Jaffe, neurologist on-call.  He recommends a CTA and admission to the hospitalist.  He also requested a brain MRI be performed when able. [AR]      ED Course User Index  [AR] Amber Clark PA  [KS] Montana Jaffe MD                                           MDM  Number of Diagnoses or Management Options     Amount and/or Complexity of Data Reviewed  Clinical lab tests: reviewed  Tests in the radiology section of CPT®: reviewed  Tests in the medicine section of CPT®: reviewed  Decide to obtain previous medical records or to obtain history from someone other than the patient: yes    Risk of Complications, Morbidity, and/or Mortality  General comments: When I first saw the patient, the patient appeared nontoxic.  Patient was stable.  Patient was oriented but at times did not answer questions appropriately.  Given this, IV was started labs were obtained.  Labs showed that the patient had urine ketones.  Patient could be dehydrated.  Patient did get 500 cc of IV fluids x1  in the ED.  Given that the patient was admitted previously for possible vertebral artery flap we did consult neurology.  Dr. Nelson wanted the patient to get a CTA of the head and neck.  This was done.  This showed the same abnormality without any other changes.  Given the patient's confusion and the CT abnormality, the decision was made to admit the patient to the hospitalist.  I spoke to Dr. Thomas with [facility].  She is excepted the patient.  I defer all further management of this patient to the inpatient hospitalist and neurology team.  At this point unclear for the patient's cause of confusion.  It could be due to dehydration versus stroke however, the patient is outside the window since this started on Thursday.  Could also be possibly dementia given the patient's age.  I will defer further work-up to the neurologist.  I am also unclear why the patient had syncope.  Patient was placed on the monitor in the ED and is being admitted to a telemetry floor.  I do not see any arrhythmias while in the ED.        Final diagnoses:   Syncope and collapse   Acute dehydration   Confusion       ED Disposition  ED Disposition     ED Disposition   Decision to Admit    Condition   --    Comment   Level of Care: Telemetry [5]   Diagnosis: Syncope and collapse [780.2.ICD-9-CM]   Admitting Physician: ZOHAIB THOMAS [[id number]]   Attending Physician: ZOHAIB THOMAS [[id number]]               No follow-up provider specified.       Medication List      No changes were made to your prescriptions during this visit.          Montana Jaffe MD  11/12/22 6603

## 2022-11-12 NOTE — PLAN OF CARE
Goal Outcome Evaluation:  alert and oriented to person and place; confused with time and situation.  Room air. Incontinent of B & B and wearing a brief.  Bedrest at this time.  MD notified for diet as well as reconciliation of medications.  Denies pain/discomfort at this time.  Call light in reach.

## 2022-11-13 ENCOUNTER — APPOINTMENT (OUTPATIENT)
Dept: MRI IMAGING | Facility: HOSPITAL | Age: 87
End: 2022-11-13

## 2022-11-13 ENCOUNTER — APPOINTMENT (OUTPATIENT)
Dept: NEUROLOGY | Facility: HOSPITAL | Age: 87
End: 2022-11-13

## 2022-11-13 LAB
ALBUMIN SERPL-MCNC: 3.7 G/DL (ref 3.5–5.2)
ALBUMIN/GLOB SERPL: 1.5 G/DL
ALP SERPL-CCNC: 86 U/L (ref 39–117)
ALT SERPL W P-5'-P-CCNC: 9 U/L (ref 1–33)
ANION GAP SERPL CALCULATED.3IONS-SCNC: 10.6 MMOL/L (ref 5–15)
AST SERPL-CCNC: 20 U/L (ref 1–32)
BILIRUB SERPL-MCNC: 1.7 MG/DL (ref 0–1.2)
BUN SERPL-MCNC: 17 MG/DL (ref 8–23)
BUN/CREAT SERPL: 19.8 (ref 7–25)
CALCIUM SPEC-SCNC: 9.1 MG/DL (ref 8.2–9.6)
CHLORIDE SERPL-SCNC: 102 MMOL/L (ref 98–107)
CHOLEST SERPL-MCNC: 259 MG/DL (ref 0–200)
CO2 SERPL-SCNC: 26.4 MMOL/L (ref 22–29)
CREAT SERPL-MCNC: 0.86 MG/DL (ref 0.57–1)
DEPRECATED RDW RBC AUTO: 39.4 FL (ref 37–54)
EGFRCR SERPLBLD CKD-EPI 2021: 63.1 ML/MIN/1.73
ERYTHROCYTE [DISTWIDTH] IN BLOOD BY AUTOMATED COUNT: 12.2 % (ref 12.3–15.4)
GLOBULIN UR ELPH-MCNC: 2.5 GM/DL
GLUCOSE BLDC GLUCOMTR-MCNC: 112 MG/DL (ref 70–130)
GLUCOSE BLDC GLUCOMTR-MCNC: 118 MG/DL (ref 70–130)
GLUCOSE BLDC GLUCOMTR-MCNC: 128 MG/DL (ref 70–130)
GLUCOSE BLDC GLUCOMTR-MCNC: 130 MG/DL (ref 70–130)
GLUCOSE SERPL-MCNC: 98 MG/DL (ref 65–99)
HBA1C MFR BLD: 5.3 % (ref 4.8–5.6)
HCT VFR BLD AUTO: 36.6 % (ref 34–46.6)
HDLC SERPL-MCNC: 54 MG/DL (ref 40–60)
HGB BLD-MCNC: 12.7 G/DL (ref 12–15.9)
LDLC SERPL CALC-MCNC: 186 MG/DL (ref 0–100)
LDLC/HDLC SERPL: 3.4 {RATIO}
MCH RBC QN AUTO: 30.5 PG (ref 26.6–33)
MCHC RBC AUTO-ENTMCNC: 34.7 G/DL (ref 31.5–35.7)
MCV RBC AUTO: 87.8 FL (ref 79–97)
PLATELET # BLD AUTO: 212 10*3/MM3 (ref 140–450)
PMV BLD AUTO: 10 FL (ref 6–12)
POTASSIUM SERPL-SCNC: 3.1 MMOL/L (ref 3.5–5.2)
PROT SERPL-MCNC: 6.2 G/DL (ref 6–8.5)
QT INTERVAL: 405 MS
RBC # BLD AUTO: 4.17 10*6/MM3 (ref 3.77–5.28)
SODIUM SERPL-SCNC: 139 MMOL/L (ref 136–145)
TRIGL SERPL-MCNC: 106 MG/DL (ref 0–150)
TSH SERPL DL<=0.05 MIU/L-ACNC: 2.61 UIU/ML (ref 0.27–4.2)
VLDLC SERPL-MCNC: 19 MG/DL (ref 5–40)
WBC NRBC COR # BLD: 8.79 10*3/MM3 (ref 3.4–10.8)

## 2022-11-13 PROCEDURE — G0378 HOSPITAL OBSERVATION PER HR: HCPCS

## 2022-11-13 PROCEDURE — 82962 GLUCOSE BLOOD TEST: CPT

## 2022-11-13 PROCEDURE — 85027 COMPLETE CBC AUTOMATED: CPT | Performed by: INTERNAL MEDICINE

## 2022-11-13 PROCEDURE — 83036 HEMOGLOBIN GLYCOSYLATED A1C: CPT | Performed by: INTERNAL MEDICINE

## 2022-11-13 PROCEDURE — 99214 OFFICE O/P EST MOD 30 MIN: CPT | Performed by: PSYCHIATRY & NEUROLOGY

## 2022-11-13 PROCEDURE — 84443 ASSAY THYROID STIM HORMONE: CPT | Performed by: INTERNAL MEDICINE

## 2022-11-13 PROCEDURE — 70551 MRI BRAIN STEM W/O DYE: CPT

## 2022-11-13 PROCEDURE — 80061 LIPID PANEL: CPT | Performed by: INTERNAL MEDICINE

## 2022-11-13 PROCEDURE — 97161 PT EVAL LOW COMPLEX 20 MIN: CPT

## 2022-11-13 PROCEDURE — 80053 COMPREHEN METABOLIC PANEL: CPT | Performed by: INTERNAL MEDICINE

## 2022-11-13 PROCEDURE — 97530 THERAPEUTIC ACTIVITIES: CPT

## 2022-11-13 RX ORDER — POTASSIUM CHLORIDE 750 MG/1
40 TABLET, FILM COATED, EXTENDED RELEASE ORAL ONCE
Status: COMPLETED | OUTPATIENT
Start: 2022-11-13 | End: 2022-11-13

## 2022-11-13 RX ORDER — ASPIRIN 81 MG/1
81 TABLET, CHEWABLE ORAL DAILY
Status: DISCONTINUED | OUTPATIENT
Start: 2022-11-13 | End: 2022-11-15 | Stop reason: HOSPADM

## 2022-11-13 RX ADMIN — ASPIRIN 325 MG: 325 TABLET ORAL at 10:22

## 2022-11-13 RX ADMIN — LORAZEPAM 0.25 MG: 0.5 TABLET ORAL at 10:22

## 2022-11-13 RX ADMIN — LORAZEPAM 0.25 MG: 0.5 TABLET ORAL at 20:15

## 2022-11-13 RX ADMIN — PANTOPRAZOLE SODIUM 40 MG: 40 TABLET, DELAYED RELEASE ORAL at 06:36

## 2022-11-13 RX ADMIN — POTASSIUM CHLORIDE 40 MEQ: 750 TABLET, EXTENDED RELEASE ORAL at 20:15

## 2022-11-13 RX ADMIN — ASPIRIN 81 MG: 81 TABLET, CHEWABLE ORAL at 12:38

## 2022-11-13 RX ADMIN — POTASSIUM CHLORIDE 40 MEQ: 750 TABLET, EXTENDED RELEASE ORAL at 17:21

## 2022-11-13 NOTE — PROGRESS NOTES
[facility] Acute Inpt Rehab Note     Referral received via stroke order set.  Please note this is a screening only, rehab admissions will not actively be evaluating this patient.  If felt patient is appropriate for our services once therapies begin, please call our office at [phone], to initiate a full referral.    Thank you,   Christine Ramos RN   Rehab Admission Nurse

## 2022-11-13 NOTE — CONSULTS
Neurology consult Note    Patient Name: Charo Thomas   MRN: [id number]  Age: 89 y.o.  Sex: female  : 4/3/1929    Primary Care Physician: Gal Ortega MD  Referring Physician:  Mariaelena Thomas, *      Handedness: Right  Race: White    Chief Complaint/Reason for Consultation: Confusion    Subjective .  HPI: 89-year-old right-handed white female with known diagnosis of hypertension, hyperlipidemia, breast cancer, who comes in with confusion as per the family, which is now improved and she is back to her baseline.  When her daughter found her, she had urinated on herself, and was trying to get out of bed, and when her daughter helped her, her eyes rolled up and became unresponsive for a minute, and then improved over the next several hours.  Overnight she has improved and is back to her baseline.  No obvious seizure activity was noted by the daughter.  No history of similar symptoms.  Patient has been forgetting things more often in the last few weeks.  No obvious focal weakness/numbness/vision changes/slurred speech.  Patient has been seen by our service in the past, and was determined to have dizziness secondary to hypotension, and her blood pressure medicines has been all held, and now her blood pressure runs in 140s and 150s, and she has not had any episodes of dizziness.  Patient refuses to take statins.      Review of Systems   Constitutional: Positive for fatigue.   Neurological:        Episode of unresponsiveness      Past Medical History:   Diagnosis Date   • Benign esophageal stricture    • Breast cancer (HCC) 2010    Right breast w/papillary features, primary tumor size 1.9 cm w/negative margins   • Breast cancer in male, right 2013    Right breast, stage I, invasive   • Diverticular disease of colon    • H. pylori infection    • H/O Migraines    • Hip arthrosis    • Hyperlipidemia    • Hyperplastic polyps of stomach    • Hypertension    • Knee swelling    • Osteoporosis    •  Psychiatric disorder    • Vitamin B12 deficiency      Past Surgical History:   Procedure Laterality Date   • APPENDECTOMY  1944   • BREAST BIOPSY Right 2010   • BREAST LUMPECTOMY     • BREAST LUMPECTOMY WITH SENTINEL NODE BIOPSY Right 2013   • COLONOSCOPY      Diverticular disease   • DILATATION AND CURETTAGE  1957, 1971    x2     Family History   Problem Relation Age of Onset   • Dementia Mother    • Cancer Mother 81        Breast removed   • Cancer Father 69        Throat; smoker   • Cancer Maternal Aunt         2 aunts at age 49 and 70; deaths unrelated to cancer   • Other Son         Brain tumor   • Hypertension Son      Social History     Socioeconomic History   • Marital status:      Spouse name: Marvin   • Number of children: 1   • Years of education: High School   Tobacco Use   • Smoking status: Never   • Smokeless tobacco: Never   Vaping Use   • Vaping Use: Never used   Substance and Sexual Activity   • Alcohol use: No   • Drug use: No   • Sexual activity: Never     Allergies   Allergen Reactions   • Cortisone    • Diphenhydramine Hcl (Sleep)    • Epinephrine    • Penicillins    • Sulfa Antibiotics      Prior to Admission medications    Medication Sig Start Date End Date Taking? Authorizing Provider   lansoprazole (PREVACID) 30 MG capsule Take 1 capsule by mouth once daily 8/9/22  Yes Gal Ortega MD   LORazepam (ATIVAN) 0.5 MG tablet Take 1/2 (one-half) tablet by mouth twice daily 10/17/22  Yes Gal Ortega MD   B Complex Vitamins (VITAMIN-B COMPLEX PO) Take  by mouth daily.    Provider, MD Gus             Objective     Temp:  [98.3 °F (36.8 °C)-98.7 °F (37.1 °C)] 98.6 °F (37 °C)  Heart Rate:  [66-84] 69  Resp:  [14-16] 16  BP: (109-176)/() 156/74  Neurological Exam  Mental Status  Awake, alert and oriented to person, place and time. Speech is normal. Language is fluent with no aphasia. Attention and concentration are normal. Fund of knowledge is appropriate for level of  education.    Cranial Nerves  CN II: Visual fields full to confrontation.  CN III, IV, VI: Extraocular movements intact bilaterally. Normal lids and orbits bilaterally. Pupils equal round and reactive to light bilaterally.  CN V: Facial sensation is normal.  CN VII: Full and symmetric facial movement.  CN VIII: Equal hearing bilaterally.  CN IX, X: Palate elevates symmetrically  CN XI: Shoulder shrug strength is normal.  CN XII: Tongue midline without atrophy or fasciculations.    Motor  Normal muscle bulk throughout. No fasciculations present. Strength is 5/5 throughout all four extremities.    Sensory  Light touch is normal in upper and lower extremities.     Reflexes  Deep tendon reflexes are 2+ and symmetric in all four extremities with downgoing toes bilaterally.    Coordination  No dysmetria.    Gait  Not assessed      Physical Exam  Vitals and nursing note reviewed.   Constitutional:       Appearance: Normal appearance.   HENT:      Head: Normocephalic and atraumatic.      Mouth/Throat:      Mouth: Mucous membranes are moist.      Pharynx: Oropharynx is clear.   Eyes:      Conjunctiva/sclera: Conjunctivae normal.      Pupils: Pupils are equal, round, and reactive to light.   Cardiovascular:      Rate and Rhythm: Normal rate and regular rhythm.   Pulmonary:      Effort: Pulmonary effort is normal. No respiratory distress.   Musculoskeletal:      Cervical back: Normal range of motion and neck supple.   Neurological:      Mental Status: She is alert.   Psychiatric:         Mood and Affect: Mood normal.         Behavior: Behavior normal.           Hospital Meds:  Scheduled- aspirin, 81 mg, Oral, Daily  LORazepam, 0.25 mg, Oral, BID  pantoprazole, 40 mg, Oral, QAM      Infusions- sodium chloride, 75 mL/hr, Last Rate: 75 mL/hr (11/13/22 0925)       PRNs- •  acetaminophen **OR** acetaminophen  •  bisacodyl  •  ondansetron  •  sodium chloride      Results Reviewed:  I have personally reviewed current lab, radiology,  and data   CT head shows no acute changes, no hemorrhage  CT angiogram of head and neck shows multiple areas of mild intracranial stenosis, including a left P-comm and left M1 segment.  Reviewed her labs.              Assessment/Plan:      1. Episode of unresponsiveness.  Unclear etiology.  Recommend getting EEG, which if negative, no further neuro work-up at this time.  Her CBC/CMP/UA looks okay.  2. Cerebral atherosclerosis.  Patient is noted to have mild left P-comm and left M1 stenosis.  Recommend starting aspirin 81 mg.  Patient refuses taking statins.  3. Essential hypertension.  Patient has been free of dizzy episodes since her blood pressure medicines has been stopped.  Hold any blood pressure medications for now.  Continue monitoring regularly.  4. Increase activity as tolerated.    Case was discussed with patient, her daughter, nursing and will let the primary team know.  Thank you for the consult.          Jose Antonio Jaffe MD  November 13, 2022  12:47 EST

## 2022-11-13 NOTE — H&P
HISTORY AND PHYSICAL   [facility]        Date of Admission: 2022  Patient Identification:  Name: Charo Thomas  Age: 89 y.o.  Sex: female  :  4/3/1929  MRN: [id number]                     Primary Care Physician: Gal Ortega MD    Chief Complaint:  89 year old female who was sent in from home due to confusion and some difficulty speaking which was noted over the last two days; she urinated in the bed and then slid out of the bed but did not injury herself because her daughter was able to catch her; she denies pain presently; her daughter is not present    History of Present Illness:   As above    Past Medical History:  Past Medical History:   Diagnosis Date   • Benign esophageal stricture    • Breast cancer (HCC) 2010    Right breast w/papillary features, primary tumor size 1.9 cm w/negative margins   • Breast cancer in male, right 2013    Right breast, stage I, invasive   • Diverticular disease of colon    • H. pylori infection    • H/O Migraines    • Hip arthrosis    • Hyperlipidemia    • Hyperplastic polyps of stomach    • Hypertension    • Knee swelling    • Osteoporosis    • Psychiatric disorder    • Vitamin B12 deficiency      Past Surgical History:  Past Surgical History:   Procedure Laterality Date   • APPENDECTOMY     • BREAST BIOPSY Right    • BREAST LUMPECTOMY     • BREAST LUMPECTOMY WITH SENTINEL NODE BIOPSY Right    • COLONOSCOPY      Diverticular disease   • DILATATION AND CURETTAGE  , 1971    x2      Home Meds:  Medications Prior to Admission   Medication Sig Dispense Refill Last Dose   • lansoprazole (PREVACID) 30 MG capsule Take 1 capsule by mouth once daily 90 capsule 1 2022   • LORazepam (ATIVAN) 0.5 MG tablet Take 1/2 (one-half) tablet by mouth twice daily 60 tablet 1 2022   • B Complex Vitamins (VITAMIN-B COMPLEX PO) Take  by mouth daily.          Allergies:  Allergies   Allergen Reactions   • Cortisone    • Diphenhydramine Hcl  (Sleep)    • Epinephrine    • Penicillins    • Sulfa Antibiotics      Immunizations:  Immunization History   Administered Date(s) Administered   • COVID-19 (PFIZER) BIVALENT BOOSTER 12+YRS 10/06/2022   • COVID-19 (PFIZER) PURPLE CAP 02/26/2021, 03/19/2021, 12/31/2021   • Covid-19 (Pfizer) Gray Cap 05/02/2022   • Pneumococcal Conjugate 13-Valent (PCV13) 08/16/2018   • Pneumococcal Polysaccharide (PPSV23) 04/27/2016     Social History:   Social History     Social History Narrative   • Not on file     Social History     Socioeconomic History   • Marital status:      Spouse name: Marvin   • Number of children: 1   • Years of education: High School   Tobacco Use   • Smoking status: Never   • Smokeless tobacco: Never   Vaping Use   • Vaping Use: Never used   Substance and Sexual Activity   • Alcohol use: No   • Drug use: No   • Sexual activity: Never       Family History:  Family History   Problem Relation Age of Onset   • Dementia Mother    • Cancer Mother 81        Breast removed   • Cancer Father 69        Throat; smoker   • Cancer Maternal Aunt         2 aunts at age 49 and 70; deaths unrelated to cancer   • Other Son         Brain tumor   • Hypertension Son         Review of Systems  See history of present illness and past medical history.  Patient denies headache, dizziness, syncope, falls, trauma, change in vision, change in hearing, change in taste, changes in weight, changes in appetite, focal weakness, numbness, or paresthesia.  Patient denies chest pain, palpitations, dyspnea, orthopnea, PND, cough, sinus pressure, rhinorrhea, epistaxis, hemoptysis, nausea, vomiting,hematemesis, diarrhea, constipation or hematchezia.  Denies cold or heat intolerance, polydipsia, polyuria, polyphagia. Denies hematuria, pyuria, dysuria, hesitancy, frequency or urgency. Denies consumption of raw and under cooked meats foods or change in water source.  Denies fever, chills, sweats, night sweats.  Denies missing any routine  "medications. Remainder of ROS is negative.    Objective:  T Max 24 hrs: Temp (24hrs), Av °F (36.7 °C), Min:97.3 °F (36.3 °C), Max:98.7 °F (37.1 °C)    Vitals Ranges:   Temp:  [97.3 °F (36.3 °C)-98.7 °F (37.1 °C)] 98.7 °F (37.1 °C)  Heart Rate:  [74-85] 79  Resp:  [14-18] 16  BP: (109-176)/() 174/85      Exam:  /85 (BP Location: Right arm, Patient Position: Held)   Pulse 79   Temp 98.7 °F (37.1 °C) (Oral)   Resp 16   Ht 157.5 cm (62\")   Wt 56.2 kg (124 lb)   SpO2 95%   BMI 22.68 kg/m²     General Appearance:    Alert, cooperative, no distress, appears stated age   Head:    Normocephalic, without obvious abnormality, atraumatic   Eyes:    PERRL, conjunctivae/corneas clear, EOM's intact, both eyes   Ears:    Normal external ear canals, both ears   Nose:   Nares normal, septum midline, mucosa normal, no drainage    or sinus tenderness   Throat:   Lips, mucosa, and tongue normal   Neck:   Supple, symmetrical, trachea midline, no adenopathy;     thyroid:  no enlargement/tenderness/nodules; no carotid    bruit or JVD   Back:     Symmetric, no curvature, ROM normal, no CVA tenderness   Lungs:     Clear to auscultation bilaterally, respirations unlabored   Chest Wall:    No tenderness or deformity    Heart:    Regular rate and rhythm, S1 and S2 normal, no murmur, rub   or gallop   Abdomen:     Soft, nontender, bowel sounds active all four quadrants,     no masses, no hepatomegaly, no splenomegaly   Extremities:   Extremities normal, atraumatic, no cyanosis or edema   Pulses:   2+ and symmetric all extremities   Skin:   Skin color, texture, turgor normal, no rashes or lesions   Lymph nodes:   Cervical, supraclavicular, and axillary nodes normal   Neurologic:   CNII-XII intact, normal strength, sensation intact throughout      .    Data Review:  Labs in chart were reviewed.  WBC   Date Value Ref Range Status   2022 10.45 3.40 - 10.80 10*3/mm3 Final     Hemoglobin   Date Value Ref Range Status "   11/12/2022 14.3 12.0 - 15.9 g/dL Final     Hematocrit   Date Value Ref Range Status   11/12/2022 42.5 34.0 - 46.6 % Final     Platelets   Date Value Ref Range Status   11/12/2022 241 140 - 450 10*3/mm3 Final     Sodium   Date Value Ref Range Status   11/12/2022 142 136 - 145 mmol/L Final     Potassium   Date Value Ref Range Status   11/12/2022 3.5 3.5 - 5.2 mmol/L Final     Chloride   Date Value Ref Range Status   11/12/2022 102 98 - 107 mmol/L Final     CO2   Date Value Ref Range Status   11/12/2022 25.5 22.0 - 29.0 mmol/L Final     BUN   Date Value Ref Range Status   11/12/2022 16 8 - 23 mg/dL Final     Creatinine   Date Value Ref Range Status   11/12/2022 1.00 0.57 - 1.00 mg/dL Final     Glucose   Date Value Ref Range Status   11/12/2022 128 (H) 65 - 99 mg/dL Final     Calcium   Date Value Ref Range Status   11/12/2022 10.1 (H) 8.2 - 9.6 mg/dL Final     Magnesium   Date Value Ref Range Status   11/12/2022 1.8 1.7 - 2.3 mg/dL Final     AST (SGOT)   Date Value Ref Range Status   11/12/2022 22 1 - 32 U/L Final     ALT (SGPT)   Date Value Ref Range Status   11/12/2022 9 1 - 33 U/L Final     Alkaline Phosphatase   Date Value Ref Range Status   11/12/2022 95 39 - 117 U/L Final                Imaging Results (All)     Procedure Component Value Units Date/Time    CT Angiogram Neck [[id number]] Collected: 11/12/22 1425     Updated: 11/12/22 1425    Narrative:      CT HEAD, CTA HEAD AND NECK     HISTORY: Dizziness, history of previous vertebral artery dissection.     COMPARISON: CTA head and neck 08/22/2022.     TECHNIQUE: Initially CT was performed of the head. Subsequently CT  angiography was performed of the head and neck following the intravenous  administration of iodinated contrast with axial images as well as  coronal and sagittal reformatted MIP images provided.  Radiation dose  reduction techniques were utilized, including automated exposure control  and exposure modulation based on body size.     FINDINGS:      CT head:   There is no finding of acute infarct, hemorrhage, contusion or abnormal  extra-axial collection. No hydrocephalus is present. No abnormal  intracranial enhancement is seen.     CT angiography neck:   The origins of the great vessels are widely patent.  The common and  internal carotid arteries are without appreciable stenosis or finding of  dissection. No significant internal carotid artery stenosis is present  by NASCET criteria. Short segment of mild stenosis of the left vertebral  artery at the approximate level of C4 is present, grossly unchanged  since 08/22/2022. Aberrant right subclavian artery is present. Bilateral  thyroid nodules are present, as before.     CT angiography head:   The proximal anterior and posterior arterial circulations are without  appreciable stenosis, aneurysm formation or occlusion.       Impression:      1.  No finding of new stenosis or occlusion of the cervical or proximal  intracranial arterial vasculature. No intracranial aneurysm evident.  2.  Mild stenosis of the left vertebral artery at the approximate level  of C4, as before, favored to represent a partially calcified plaque. A  short dissection flap is also possible.          CT Angiogram Head [[id number]] Collected: 11/12/22 1425     Updated: 11/12/22 1425    Narrative:      CT HEAD, CTA HEAD AND NECK     HISTORY: Dizziness, history of previous vertebral artery dissection.     COMPARISON: CTA head and neck 08/22/2022.     TECHNIQUE: Initially CT was performed of the head. Subsequently CT  angiography was performed of the head and neck following the intravenous  administration of iodinated contrast with axial images as well as  coronal and sagittal reformatted MIP images provided.  Radiation dose  reduction techniques were utilized, including automated exposure control  and exposure modulation based on body size.     FINDINGS:     CT head:   There is no finding of acute infarct, hemorrhage, contusion or  abnormal  extra-axial collection. No hydrocephalus is present. No abnormal  intracranial enhancement is seen.     CT angiography neck:   The origins of the great vessels are widely patent.  The common and  internal carotid arteries are without appreciable stenosis or finding of  dissection. No significant internal carotid artery stenosis is present  by NASCET criteria. Short segment of mild stenosis of the left vertebral  artery at the approximate level of C4 is present, grossly unchanged  since 08/22/2022. Aberrant right subclavian artery is present. Bilateral  thyroid nodules are present, as before.     CT angiography head:   The proximal anterior and posterior arterial circulations are without  appreciable stenosis, aneurysm formation or occlusion.       Impression:      1.  No finding of new stenosis or occlusion of the cervical or proximal  intracranial arterial vasculature. No intracranial aneurysm evident.  2.  Mild stenosis of the left vertebral artery at the approximate level  of C4, as before, favored to represent a partially calcified plaque. A  short dissection flap is also possible.          CT Head Without Contrast [[id number]] Collected: 11/12/22 1311     Updated: 11/12/22 1335    Narrative:      CT SCAN OF THE BRAIN WITHOUT CONTRAST     HISTORY: Confusion.     The CT scan was performed as an emergency procedure through the brain  without contrast. There is prominent diffuse atrophy and chronic small  vessel ischemic change similar to the study of 08/22/2022. There is no  evidence of acute intracranial hemorrhage or mass effect. There is some  polypoid mucosal thickening at the floor of the right maxillary sinus  that is unchanged. The mastoid air cells are clear.                 Radiation dose reduction techniques were utilized, including automated  exposure control and exposure modulation based on body size.     This report was finalized on 11/12/2022 1:32 PM by Dr. Vaibhav Perez M.D.       XR  Chest 1 View [[id number]] Collected: 11/12/22 1128     Updated: 11/12/22 1132    Narrative:      ONE VIEW PORTABLE CHEST     HISTORY: Weakness and dizziness.     FINDINGS: The lungs are well-expanded and clear except for some minimal  probable chronic interstitial prominence. The heart is top normal in  size and no acute abnormality is seen.     This report was finalized on 11/12/2022 11:29 AM by Dr. Vaibhav Perez M.D.               Assessment:  Active Hospital Problems    Diagnosis  POA   • **Syncope and collapse [R55]  Yes      Resolved Hospital Problems   No resolved problems to display.   altered mental status  Hypertension  hypokalemia    Plan:  Stroke workup  Monitor on telemetry  Replace potassium  D.w patient and ED provider    Mariaelena Thomas MD  11/12/2022  20:50 EST

## 2022-11-13 NOTE — PLAN OF CARE
Goal Outcome Evaluation:  Plan of Care Reviewed With: patient           Outcome Evaluation: Patient is a 89 y.o female who presents to [facility] with confusion and generalized weakness. Patient AOx4 supine in bed upon arrival with daughter present at bedside. Patient lives at home alone though daughter checks on patient 3x per day. Patient is independent with ADLs but daughter present when bathing. Patient ambulates with a rwx at baseline. Patient reports knee arthritis limiting overall mobility. Patient sat up to EOB with Wang this date. Patient required Wang for STS from EOB this date. Patient required some assist with completing toileting needs. Patient required modA to perform STS from low commode x2 this date. Patient ambulated 25ft to stretcher in hallway with rwx and CGA-Wang. Gait and mildly unsteady though no overt LOB noted. Patient on stretcher with transport at end of session. Patient demonstrates functional mobility below baseline at this time. Patient may benefit from skilled PT intervention to address deficits. PT recommends home with assist and HHPT vs. SNF at d/c pending progress. Will continue to monitor.

## 2022-11-13 NOTE — CASE MANAGEMENT/SOCIAL WORK
Discharge Planning Assessment  [facility]     Patient Name: Charo Thomas  MRN: [id number]  Today's Date: 11/13/2022    Admit Date: 11/12/2022    Plan: Return home with HH vs. SNF   Discharge Needs Assessment     Row Name 11/13/22 1243       Living Environment    People in Home alone    Current Living Arrangements condominium    Primary Care Provided by self    Provides Primary Care For no one, unable/limited ability to care for self    Family Caregiver if Needed child(saad), adult    Family Caregiver Names Daughter ([facility]) Monet French [phone]    Quality of Family Relationships helpful    Able to Return to Prior Arrangements yes       Resource/Environmental Concerns    Resource/Environmental Concerns none    Transportation Concerns none       Transition Planning    Patient/Family Anticipates Transition to home    Patient/Family Anticipated Services at Transition home health care    Transportation Anticipated family or friend will provide       Discharge Needs Assessment    Readmission Within the Last 30 Days no previous admission in last 30 days    Equipment Currently Used at Home walker, rolling;wheelchair;bath bench;grab bar    Concerns to be Addressed basic needs    Anticipated Changes Related to Illness none    Equipment Needed After Discharge none    Provided Post Acute Provider List? Yes    Post Acute Provider List Home Health;Nursing Home    Delivered To Support Person    Support Person Left at bedside for daughter Monet Manolo               Discharge Plan     Row Name 11/13/22 1246       Plan    Plan Return home with HH vs. SNF    Patient/Family in Agreement with Plan yes    Plan Comments Spoke with patient at bedside.  She states she lives alone but daughter helps her.  She uses a walker.  She is agreeable for [facility] to speak with her daughter.  Spoke with Monet French ([facility] in [facility]) [phone].  Patient lives in 1 story condo with no steps.  She has a W/C, bath bench and grab bars in the BR.   She has used [facility] and [facility] in the past and has never been to SNF.  PCP is Dr. Gal Ortega and pharmacy is [facility] in [place].  Daughter states she checks on patient at least 3 x's /day.  She sets up patient's meds and reminds her to take them.  She states she will discuss with patient if she needs to go to SNF  or return home with HH.  [facility] and list of SNF by area left in room.  No PT eval yet.  CCP will follow.  Siomara BERG              Continued Care and Services - Admitted Since 11/12/2022    Coordination has not been started for this encounter.       Expected Discharge Date and Time     Expected Discharge Date Expected Discharge Time    Nov 16, 2022          Demographic Summary     Row Name 11/13/22 1242       General Information    Admission Type observation    Arrived From home    Referral Source admission list    Reason for Consult discharge planning    Preferred Language English               Functional Status     Row Name 11/13/22 1242       Functional Status    Usual Activity Tolerance moderate    Current Activity Tolerance fair       Functional Status, IADL    Medications assistive person    Meal Preparation completely dependent    Housekeeping completely dependent    Shopping completely dependent       Mental Status    General Appearance WDL WDL       Mental Status Summary    Recent Changes in Mental Status/Cognitive Functioning no changes                         Becky S. [place], RN

## 2022-11-13 NOTE — THERAPY EVALUATION
Patient Name: Charo Thomas  : 4/3/1929    MRN: [id number]                              Today's Date: 2022       Admit Date: 2022    Visit Dx:     ICD-10-CM ICD-9-CM   1. Syncope and collapse  R55 780.2   2. Acute dehydration  E86.0 276.51   3. Confusion  R41.0 298.9     Patient Active Problem List   Diagnosis   • Anxiety   • Benzodiazepine dependence (HCC)   • Generalized anxiety disorder   • Gastroesophageal reflux disease   • Essential hypertension   • Peripheral neuropathy   • Acoustic neuroma (HCC)   • Malignant neoplasm of overlapping sites of right breast in female, estrogen receptor positive (HCC)   • Vitamin B12 deficiency   • Radial artery aneurysm, left (HCC)   • Primary osteoarthritis of left knee   • Dizzy spells   • Dizziness   • Orthostasis   • Syncope and collapse     Past Medical History:   Diagnosis Date   • Benign esophageal stricture    • Breast cancer (HCC) 2010    Right breast w/papillary features, primary tumor size 1.9 cm w/negative margins   • Breast cancer in male, right 2013    Right breast, stage I, invasive   • Diverticular disease of colon    • H. pylori infection    • H/O Migraines    • Hip arthrosis    • Hyperlipidemia    • Hyperplastic polyps of stomach    • Hypertension    • Knee swelling    • Osteoporosis    • Psychiatric disorder    • Vitamin B12 deficiency      Past Surgical History:   Procedure Laterality Date   • APPENDECTOMY     • BREAST BIOPSY Right    • BREAST LUMPECTOMY     • BREAST LUMPECTOMY WITH SENTINEL NODE BIOPSY Right    • COLONOSCOPY      Diverticular disease   • DILATATION AND CURETTAGE  , 1971    x2      General Information     Row Name 22 1253          Physical Therapy Time and Intention    Document Type evaluation  -SM     Mode of Treatment individual therapy;physical therapy  -     Row Name 22 1253          General Information    Patient Profile Reviewed yes  -SM     Prior Level of Function independent:   Daughter present when in shower  -     Existing Precautions/Restrictions fall  -     Row Name 11/13/22 1253          Living Environment    People in Home alone  Daughter checks on patient 3x per day  -     Row Name 11/13/22 1253          Home Main Entrance    Number of Stairs, Main Entrance none  -     Row Name 11/13/22 1253          Cognition    Orientation Status (Cognition) oriented x 4  -[facility] Name 11/13/22 1253          Safety Issues, Functional Mobility    Impairments Affecting Function (Mobility) balance;strength;endurance/activity tolerance  -           User Key  (r) = Recorded By, (t) = Taken By, (c) = Cosigned By    Initials Name Provider Type     Kalee Head, BLAS Physical Therapist               Mobility     Row Name 11/13/22 1255          Bed Mobility    Bed Mobility supine-sit;sit-supine  -     Supine-Sit [place] (Bed Mobility) minimum assist (75% patient effort);verbal cues  -     Sit-Supine [place] (Bed Mobility) minimum assist (75% patient effort);verbal cues  -     Assistive Device (Bed Mobility) bed rails;head of bed elevated  -[facility] Name 11/13/22 1255          Sit-Stand Transfer    Sit-Stand [place] (Transfers) minimum assist (75% patient effort);moderate assist (50% patient effort)  -     Assistive Device (Sit-Stand Transfers) walker, front-wheeled  -     Comment, (Sit-Stand Transfer) x3 - from bed and low commode  -[facility] Name 11/13/22 1255          Gait/Stairs (Locomotion)    [place] Level (Gait) contact guard;minimum assist (75% patient effort)  -     Assistive Device (Gait) walker, front-wheeled  -     Distance in Feet (Gait) 25ft  -     Deviations/Abnormal Patterns (Gait) viki decreased;gait speed decreased;festinating/shuffling  -     Bilateral Gait Deviations forward flexed posture;heel strike decreased  -     [place] Level (Stairs) not tested  -     Comment, (Gait/Stairs) Gait slow and mildly unsteady. No  overt LOB noted  -           User Key  (r) = Recorded By, (t) = Taken By, (c) = Cosigned By    Initials Name Provider Type     Kalee Head, BLAS Physical Therapist               Obj/Interventions     [facility] Name 11/13/22 1256          Range of Motion Comprehensive    General Range of Motion no range of motion deficits identified  -SM     Row Name 11/13/22 1256          Strength Comprehensive (MMT)    General Manual Muscle Testing (MMT) Assessment lower extremity strength deficits identified  -     Comment, General Manual Muscle Testing (MMT) Assessment Generalized weakness  -[facility] Name 11/13/22 1256          Balance    Balance Assessment sitting static balance;sitting dynamic balance;sit to stand dynamic balance;standing static balance;standing dynamic balance  -     Static Sitting Balance standby assist  -     Dynamic Sitting Balance standby assist  while donning shoes  -     Position, Sitting Balance sitting edge of bed  -     Sit to Stand Dynamic Balance minimal assist;moderate assist;verbal cues  -     Static Standing Balance contact guard  -     Dynamic Standing Balance minimal assist;contact guard;verbal cues  -     Position/Device Used, Standing Balance supported;walker, front-wheeled  -     Balance Interventions sitting;standing;sit to stand;supported;static;dynamic  -           User Key  (r) = Recorded By, (t) = Taken By, (c) = Cosigned By    Initials Name Provider Type     Kalee Head, PT Physical Therapist               Goals/Plan     Row Name 11/13/22 6144          Bed Mobility Goal 1 (PT)    Activity/Assistive Device (Bed Mobility Goal 1, PT) bed mobility activities, all  -     [place] Level/Cues Needed (Bed Mobility Goal 1, PT) supervision required  -     Time Frame (Bed Mobility Goal 1, PT) 1 week  -[facility] Name 11/13/22 1306          Transfer Goal 1 (PT)    Activity/Assistive Device (Transfer Goal 1, PT)  sit-to-stand/stand-to-sit;bed-to-chair/chair-to-bed;walker, rolling  -SM     [place] Level/Cues Needed (Transfer Goal 1, PT) standby assist  -SM     Time Frame (Transfer Goal 1, PT) 1 week  -SM     Row Name 11/13/22 1304          Gait Training Goal 1 (PT)    Activity/Assistive Device (Gait Training Goal 1, PT) gait (walking locomotion);walker, rolling  -SM     [place] Level (Gait Training Goal 1, PT) standby assist  -SM     Distance (Gait Training Goal 1, PT) 50ft  -SM     Time Frame (Gait Training Goal 1, PT) 1 week  -SM           User Key  (r) = Recorded By, (t) = Taken By, (c) = Cosigned By    Initials Name Provider Type    Kalee Dsouza, PT Physical Therapist               Clinical Impression     Row Name 11/13/22 1257          Pain    Pretreatment Pain Rating 0/10 - no pain  -SM     Posttreatment Pain Rating 0/10 - no pain  -SM     Row Name 11/13/22 1257          Plan of Care Review    Plan of Care Reviewed With patient  -     Outcome Evaluation Patient is a 89 y.o female who presents to [facility] with confusion and generalized weakness. Patient AOx4 supine in bed upon arrival with daughter present at bedside. Patient lives at home alone though daughter checks on patient 3x per day. Patient is independent with ADLs but daughter present when bathing. Patient ambulates with a rwx at baseline. Patient reports knee arthritis limiting overall mobility. Patient sat up to EOB with Wang this date. Patient required Wang for STS from EOB this date. Patient required some assist with completing toileting needs. Patient required modA to perform STS from low commode x2 this date. Patient ambulated 25ft to stretcher in hallway with rwx and CGA-Wang. Gait and mildly unsteady though no overt LOB noted. Patient on stretcher with transport at end of session. Patient demonstrates functional mobility below baseline at this time. Patient may benefit from skilled PT intervention to address deficits. PT recommends home  with assist and HHPT vs. SNF at d/c pending progress. Will continue to monitor.  -     Row Name 11/13/22 1257          Therapy Assessment/Plan (PT)    Rehab Potential (PT) good, to achieve stated therapy goals  -     Criteria for Skilled Interventions Met (PT) yes  -     Therapy Frequency (PT) 5 times/wk  -     Row Name 11/13/22 1257          Vital Signs    O2 Delivery Pre Treatment room air  -SM     O2 Delivery Intra Treatment room air  -SM     O2 Delivery Post Treatment room air  -SM     Pre Patient Position Supine  -SM     Intra Patient Position Standing  -SM     Post Patient Position Supine  -     Row Name 11/13/22 1257          Positioning and Restraints    Pre-Treatment Position in bed  -SM     Post Treatment Position other  -SM     Other Position with other staff  With transport  -           User Key  (r) = Recorded By, (t) = Taken By, (c) = Cosigned By    Initials Name Provider Type    Kalee Dsouza PT Physical Therapist               Outcome Measures     Row Name 11/13/22 1304          How much help from another person do you currently need...    Turning from your back to your side while in flat bed without using bedrails? 3  -SM     Moving from lying on back to sitting on the side of a flat bed without bedrails? 3  -SM     Moving to and from a bed to a chair (including a wheelchair)? 3  -SM     Standing up from a chair using your arms (e.g., wheelchair, bedside chair)? 3  -SM     Climbing 3-5 steps with a railing? 2  -SM     To walk in hospital room? 3  -SM     AM-PAC 6 Clicks Score (PT) 17  -SM     Highest level of mobility 5 --> Static standing  -[facility] Name 11/13/22 1304          Functional Assessment    Outcome Measure Options AM-PAC 6 Clicks Basic Mobility (PT)  -           User Key  (r) = Recorded By, (t) = Taken By, (c) = Cosigned By    Initials Name Provider Type    Kalee Dsouza PT Physical Therapist                             Physical Therapy Education      Title: PT OT SLP Therapies (In Progress)     Topic: Physical Therapy (In Progress)     Point: Mobility training (Done)     Learning Progress Summary           Patient Acceptance, E, VU by  at 11/13/2022 1305                   Point: Home exercise program (Not Started)     Learner Progress:  Not documented in this visit.          Point: Body mechanics (Done)     Learning Progress Summary           Patient Acceptance, E, VU by  at 11/13/2022 1305                   Point: Precautions (Done)     Learning Progress Summary           Patient Acceptance, E, VU by  at 11/13/2022 1305                               User Key     Initials Effective Dates Name Provider Type Discipline     05/02/22 -  Kalee Head, BLAS Physical Therapist PT              PT Recommendation and Plan     Plan of Care Reviewed With: patient  Outcome Evaluation: Patient is a 89 y.o female who presents to [facility] with confusion and generalized weakness. Patient AOx4 supine in bed upon arrival with daughter present at bedside. Patient lives at home alone though daughter checks on patient 3x per day. Patient is independent with ADLs but daughter present when bathing. Patient ambulates with a rwx at baseline. Patient reports knee arthritis limiting overall mobility. Patient sat up to EOB with Wang this date. Patient required Wang for STS from EOB this date. Patient required some assist with completing toileting needs. Patient required modA to perform STS from low commode x2 this date. Patient ambulated 25ft to stretcher in hallway with rwx and CGA-Wang. Gait and mildly unsteady though no overt LOB noted. Patient on stretcher with transport at end of session. Patient demonstrates functional mobility below baseline at this time. Patient may benefit from skilled PT intervention to address deficits. PT recommends home with assist and HHPT vs. SNF at d/c pending progress. Will continue to monitor.     Time Calculation:    PT Charges     Row Name 11/13/22  1306             Time Calculation    Start Time 0847  -      Stop Time 0911  -SM      Time Calculation (min) 24 min  -SM      PT Received On 11/13/22  -      PT - Next Appointment 11/14/22  -      PT Goal Re-Cert Due Date 11/20/22  -         Time Calculation- PT    Total Timed Code Minutes- PT 12 minute(s)  -SM         Timed Charges    53883 - PT Therapeutic Activity Minutes 12  -SM         Total Minutes    Timed Charges Total Minutes 12  -SM       Total Minutes 12  -SM            User Key  (r) = Recorded By, (t) = Taken By, (c) = Cosigned By    Initials Name Provider Type     Kalee Head PT Physical Therapist              Therapy Charges for Today     Code Description Service Date Service Provider Modifiers Qty    99236370694 HC PT THERAPEUTIC ACT EA 15 MIN 11/13/2022 Kalee Head, PT GP 1    25588585949 HC PT EVAL LOW COMPLEXITY 3 11/13/2022 Kalee Head PT GP 1          PT G-Codes  Outcome Measure Options: AM-PAC 6 Clicks Basic Mobility (PT)  AM-PAC 6 Clicks Score (PT): 17  Patient was not wearing a face mask during this therapy encounter. Therapist used appropriate personal protective equipment including mask and gloves.  Mask used was standard procedure mask. Appropriate PPE was worn during the entire therapy session. Hand hygiene was completed before and after therapy session. Patient is not in enhanced droplet precautions.     Kalee Head PT  11/13/2022

## 2022-11-13 NOTE — PROGRESS NOTES
[facility] Acute Inpt Rehab Note     Referral received via stroke order set.   MRI negative for acute findings.  Will go ahead and sign off at this time.  Should patient display needs for our services, please call our office at [phone], to initiate a full referral once therapies begin and diagnosis made.    Thank you,   Christine Ramos, RN   Rehab Admission Nurse

## 2022-11-13 NOTE — PROGRESS NOTES
Name: Charo Thomas ADMIT: 2022   : 4/3/1929  PCP: Gal Ortega MD    MRN: [id number] LOS: 0 days   AGE/SEX: 89 y.o. female  ROOM: [facility]     Subjective   Subjective   Patient seen this morning, daughter present bedside.  No further episode dizziness.  Denies chest pain, palpitations, shortness of breath..  Denies nausea, vomiting, abdominal pain, fevers, chills.  Denies dysuria.    Review of Systems   as above  Objective   Objective   Vital Signs  Temp:  [97.4 °F (36.3 °C)-98.7 °F (37.1 °C)] 97.4 °F (36.3 °C)  Heart Rate:  [66-79] 77  Resp:  [16] 16  BP: (127-174)/(74-91) 127/91  SpO2:  [93 %-98 %] 93 %  on   ;   Device (Oxygen Therapy): room air  Body mass index is 22.68 kg/m².  Physical Exam    General: Alert, laying in bed, no distress, cooperative, elderly, frail  HEENT: Normocephalic, atraumatic  CV: Regular rate and rhythm, no murmurs rubs or gallops  Lungs: Clear to auscultation bilaterally, no crackles or wheezes  Abdomen: Soft, nontender, nondistended  Extremities: No significant peripheral edema , no cyanosis     Results Review     I reviewed the patient's new clinical results.  Results from last 7 days   Lab Units 22  0750 22  1139   WBC 10*3/mm3 8.79 10.45   HEMOGLOBIN g/dL 12.7 14.3   PLATELETS 10*3/mm3 212 241     Results from last 7 days   Lab Units 22  0750 22  1139   SODIUM mmol/L 139 142   POTASSIUM mmol/L 3.1* 3.5   CHLORIDE mmol/L 102 102   CO2 mmol/L 26.4 25.5   BUN mg/dL 17 16   CREATININE mg/dL 0.86 1.00   GLUCOSE mg/dL 98 128*   Estimated Creatinine Clearance: 36.3 mL/min (by C-G formula based on SCr of 0.86 mg/dL).  Results from last 7 days   Lab Units 22  0750 22  1139   ALBUMIN g/dL 3.70 4.70   BILIRUBIN mg/dL 1.7* 1.2   ALK PHOS U/L 86 95   AST (SGOT) U/L 20 22   ALT (SGPT) U/L 9 9     Results from last 7 days   Lab Units 22  0750 22  1139   CALCIUM mg/dL 9.1 10.1*   ALBUMIN g/dL 3.70 4.70   MAGNESIUM mg/dL  --  1.8        COVID19   Date Value Ref Range Status   08/22/2022 Not Detected Not Detected - Ref. Range Final     Hemoglobin A1C   Date/Time Value Ref Range Status   11/13/2022 0750 5.30 4.80 - 5.60 % Final     Glucose   Date/Time Value Ref Range Status   11/13/2022 1204 118 70 - 130 mg/dL Final     Comment:     Meter: [id number] : [id number] Katelin Pérez NA   11/13/2022 0611 112 70 - 130 mg/dL Final     Comment:     Meter: [id number] : [id number] Lanre Salter NA   11/12/2022 2325 105 70 - 130 mg/dL Final     Comment:     Meter: [id number] : [id number] Lanre Salter NA   11/12/2022 1245 120 70 - 130 mg/dL Final     Comment:     Meter: [id number] : [id number] Kenneth Morgan RN       MRI Brain Without Contrast  MR SCAN OF THE BRAIN WITHOUT CONTRAST     HISTORY: Previous history of vertebral artery dissection. Dizziness.     The MR scan was performed with sagittal and axial images without  contrast. There is considerable diffuse atrophy and chronic small vessel  ischemic change similar to the MRI scan dated 08/22/2022. There is no  evidence of acute intracranial hemorrhage or mass effect and the  diffusion sequence shows no evidence of acute infarct.     There are normal flow voids in the major vessels. There is some  localized mucosal thickening at the floor the right maxillary sinus that  is unchanged. The mastoid air cells are clear.     CONCLUSION: Prominent diffuse atrophy and chronic small vessel ischemic  change. No evidence of acute infarct. See above discussion.     This report was finalized on 11/13/2022 10:25 AM by Dr. Vaibhav Perez M.D.       Scheduled Medications  aspirin, 81 mg, Oral, Daily  LORazepam, 0.25 mg, Oral, BID  pantoprazole, 40 mg, Oral, QAM  potassium chloride, 40 mEq, Oral, Once  potassium chloride, 40 mEq, Oral, Once    Infusions   Diet  Diet Regular       Assessment/Plan     Active Hospital Problems    Diagnosis  POA   • **Syncope and collapse [R55]  Yes   • Hypokalemia [E87.6]   Unknown   • Generalized anxiety disorder [F41.1]  Yes   • Gastroesophageal reflux disease [K21.9]  Yes   • Essential hypertension [I10]  Yes      Resolved Hospital Problems   No resolved problems to display.             Syncope: CT head, CTA of the head and neck with no acute findings.   EKG shows sinus rhythm, troponin negative x2.  Urinalysis and chest x-ray reviewed no signs of infection.  Afebrile.  WBC normal.  TSH normal.  MRI showed prominent diffuse atrophy and chronic small vessel ischemic  change. No evidence of acute infarct. Neurology evaluated.  Plan for EEG.  Ordered orthostatic vitals.  Echocardiogram pending.      Hypokalemia: Potassium 3.1, ordered replacement.  Monitor daily.    Cerebral atherosclerosis: Started on aspirin per neurology, patient does not want statin.      Essential hypertension.    BP acutely stable.  Blood per medications has been discontinued previously secondary to dizziness.  Monitor.      Generalized anxiety disorder: Patient is on Ativan 0.25 mg twice daily.  Continue.    Discussed with patient, daughter, RN    DVT prophylax: SCDs  CODE STATUS: Full code  Disposition:  home with home health versus SNF pending clinical progress.    I wore protective equipment throughout this patient encounter including a face mask, gloves and protective eyewear.  Hand hygiene was performed before donning protective equipment and after removal when leaving the room.      Dictated utilizing Dragon dictation        Darrell Lee MD  [facility]  11/13/22  16:45 EST

## 2022-11-14 ENCOUNTER — APPOINTMENT (OUTPATIENT)
Dept: NEUROLOGY | Facility: HOSPITAL | Age: 87
End: 2022-11-14

## 2022-11-14 ENCOUNTER — APPOINTMENT (OUTPATIENT)
Dept: CARDIOLOGY | Facility: HOSPITAL | Age: 87
End: 2022-11-14

## 2022-11-14 LAB
ALBUMIN SERPL-MCNC: 3.2 G/DL (ref 3.5–5.2)
ALBUMIN/GLOB SERPL: 1.3 G/DL
ALP SERPL-CCNC: 78 U/L (ref 39–117)
ALT SERPL W P-5'-P-CCNC: 10 U/L (ref 1–33)
ANION GAP SERPL CALCULATED.3IONS-SCNC: 6.2 MMOL/L (ref 5–15)
AORTIC DIMENSIONLESS INDEX: 0.9 (DI)
AST SERPL-CCNC: 21 U/L (ref 1–32)
BH CV ECHO MEAS - AO MAX PG: 3.2 MMHG
BH CV ECHO MEAS - AO MEAN PG: 1.7 MMHG
BH CV ECHO MEAS - AO ROOT DIAM: 2.7 CM
BH CV ECHO MEAS - AO V2 MAX: 89.6 CM/SEC
BH CV ECHO MEAS - AO V2 VTI: 19.1 CM
BH CV ECHO MEAS - AVA(I,D): 1.99 CM2
BH CV ECHO MEAS - EDV(CUBED): 71.5 ML
BH CV ECHO MEAS - EDV(MOD-SP2): 63 ML
BH CV ECHO MEAS - EDV(MOD-SP4): 69 ML
BH CV ECHO MEAS - EF(MOD-BP): 48.6 %
BH CV ECHO MEAS - EF(MOD-SP2): 50.8 %
BH CV ECHO MEAS - EF(MOD-SP4): 50.7 %
BH CV ECHO MEAS - ESV(CUBED): 30.3 ML
BH CV ECHO MEAS - ESV(MOD-SP2): 31 ML
BH CV ECHO MEAS - ESV(MOD-SP4): 34 ML
BH CV ECHO MEAS - FS: 24.9 %
BH CV ECHO MEAS - IVS/LVPW: 1 CM
BH CV ECHO MEAS - IVSD: 0.83 CM
BH CV ECHO MEAS - LAT PEAK E' VEL: 5.7 CM/SEC
BH CV ECHO MEAS - LV DIASTOLIC VOL/BSA (35-75): 44.2 CM2
BH CV ECHO MEAS - LV MASS(C)D: 105.1 GRAMS
BH CV ECHO MEAS - LV MAX PG: 2.6 MMHG
BH CV ECHO MEAS - LV MEAN PG: 1.6 MMHG
BH CV ECHO MEAS - LV SYSTOLIC VOL/BSA (12-30): 21.8 CM2
BH CV ECHO MEAS - LV V1 MAX: 81 CM/SEC
BH CV ECHO MEAS - LV V1 VTI: 17.9 CM
BH CV ECHO MEAS - LVIDD: 4.2 CM
BH CV ECHO MEAS - LVIDS: 3.1 CM
BH CV ECHO MEAS - LVOT AREA: 2.13 CM2
BH CV ECHO MEAS - LVOT DIAM: 1.65 CM
BH CV ECHO MEAS - LVPWD: 0.83 CM
BH CV ECHO MEAS - MED PEAK E' VEL: 5.5 CM/SEC
BH CV ECHO MEAS - MR MAX PG: 136.8 MMHG
BH CV ECHO MEAS - MR MAX VEL: 584.9 CM/SEC
BH CV ECHO MEAS - MV A DUR: 0.08 SEC
BH CV ECHO MEAS - MV A MAX VEL: 110.5 CM/SEC
BH CV ECHO MEAS - MV DEC SLOPE: 250.1 CM/SEC2
BH CV ECHO MEAS - MV DEC TIME: 0.28 MSEC
BH CV ECHO MEAS - MV E MAX VEL: 63.8 CM/SEC
BH CV ECHO MEAS - MV E/A: 0.58
BH CV ECHO MEAS - MV MAX PG: 4.8 MMHG
BH CV ECHO MEAS - MV MEAN PG: 1.93 MMHG
BH CV ECHO MEAS - MV P1/2T: 89.9 MSEC
BH CV ECHO MEAS - MV V2 VTI: 24.5 CM
BH CV ECHO MEAS - MVA(P1/2T): 2.45 CM2
BH CV ECHO MEAS - MVA(VTI): 1.55 CM2
BH CV ECHO MEAS - PA ACC TIME: 0.09 SEC
BH CV ECHO MEAS - PA PR(ACCEL): 37.4 MMHG
BH CV ECHO MEAS - PA V2 MAX: 95.4 CM/SEC
BH CV ECHO MEAS - RAP SYSTOLE: 3 MMHG
BH CV ECHO MEAS - RV MAX PG: 1.42 MMHG
BH CV ECHO MEAS - RV V1 MAX: 59.6 CM/SEC
BH CV ECHO MEAS - RV V1 VTI: 14.3 CM
BH CV ECHO MEAS - RVSP: 15 MMHG
BH CV ECHO MEAS - SI(MOD-SP2): 20.5 ML/M2
BH CV ECHO MEAS - SI(MOD-SP4): 22.4 ML/M2
BH CV ECHO MEAS - SV(LVOT): 38.1 ML
BH CV ECHO MEAS - SV(MOD-SP2): 32 ML
BH CV ECHO MEAS - SV(MOD-SP4): 35 ML
BH CV ECHO MEAS - TAPSE (>1.6): 1.4 CM
BH CV ECHO MEAS - TR MAX PG: 12 MMHG
BH CV ECHO MEAS - TR MAX VEL: 173.6 CM/SEC
BH CV ECHO MEASUREMENTS AVERAGE E/E' RATIO: 11.39
BH CV XLRA - RV BASE: 2.7 CM
BH CV XLRA - RV LENGTH: 6.4 CM
BH CV XLRA - RV MID: 2.9 CM
BH CV XLRA - TDI S': 8.8 CM/SEC
BILIRUB SERPL-MCNC: 1.1 MG/DL (ref 0–1.2)
BUN SERPL-MCNC: 16 MG/DL (ref 8–23)
BUN/CREAT SERPL: 19.8 (ref 7–25)
CALCIUM SPEC-SCNC: 9.2 MG/DL (ref 8.2–9.6)
CHLORIDE SERPL-SCNC: 109 MMOL/L (ref 98–107)
CO2 SERPL-SCNC: 26.8 MMOL/L (ref 22–29)
CREAT SERPL-MCNC: 0.81 MG/DL (ref 0.57–1)
DEPRECATED RDW RBC AUTO: 41 FL (ref 37–54)
EGFRCR SERPLBLD CKD-EPI 2021: 67.8 ML/MIN/1.73
ERYTHROCYTE [DISTWIDTH] IN BLOOD BY AUTOMATED COUNT: 12.5 % (ref 12.3–15.4)
GLOBULIN UR ELPH-MCNC: 2.4 GM/DL
GLUCOSE BLDC GLUCOMTR-MCNC: 224 MG/DL (ref 70–130)
GLUCOSE SERPL-MCNC: 98 MG/DL (ref 65–99)
HCT VFR BLD AUTO: 34.1 % (ref 34–46.6)
HGB BLD-MCNC: 11.7 G/DL (ref 12–15.9)
LEFT ATRIUM VOLUME INDEX: 14.1 ML/M2
MAXIMAL PREDICTED HEART RATE: 127 BPM
MCH RBC QN AUTO: 31 PG (ref 26.6–33)
MCHC RBC AUTO-ENTMCNC: 34.3 G/DL (ref 31.5–35.7)
MCV RBC AUTO: 90.5 FL (ref 79–97)
PLATELET # BLD AUTO: 197 10*3/MM3 (ref 140–450)
PMV BLD AUTO: 9.4 FL (ref 6–12)
POTASSIUM SERPL-SCNC: 4.4 MMOL/L (ref 3.5–5.2)
PROT SERPL-MCNC: 5.6 G/DL (ref 6–8.5)
RBC # BLD AUTO: 3.77 10*6/MM3 (ref 3.77–5.28)
RPR SER QL: NORMAL
SODIUM SERPL-SCNC: 142 MMOL/L (ref 136–145)
STRESS TARGET HR: 108 BPM
VIT B12 BLD-MCNC: 245 PG/ML (ref 211–946)
WBC NRBC COR # BLD: 6.67 10*3/MM3 (ref 3.4–10.8)

## 2022-11-14 PROCEDURE — G0378 HOSPITAL OBSERVATION PER HR: HCPCS

## 2022-11-14 PROCEDURE — 97530 THERAPEUTIC ACTIVITIES: CPT

## 2022-11-14 PROCEDURE — 93306 TTE W/DOPPLER COMPLETE: CPT

## 2022-11-14 PROCEDURE — 97166 OT EVAL MOD COMPLEX 45 MIN: CPT

## 2022-11-14 PROCEDURE — 80053 COMPREHEN METABOLIC PANEL: CPT | Performed by: STUDENT IN AN ORGANIZED HEALTH CARE EDUCATION/TRAINING PROGRAM

## 2022-11-14 PROCEDURE — 99214 OFFICE O/P EST MOD 30 MIN: CPT | Performed by: NURSE PRACTITIONER

## 2022-11-14 PROCEDURE — 95819 EEG AWAKE AND ASLEEP: CPT

## 2022-11-14 PROCEDURE — 82962 GLUCOSE BLOOD TEST: CPT

## 2022-11-14 PROCEDURE — 96372 THER/PROPH/DIAG INJ SC/IM: CPT

## 2022-11-14 PROCEDURE — 25010000002 CYANOCOBALAMIN PER 1000 MCG: Performed by: NURSE PRACTITIONER

## 2022-11-14 PROCEDURE — 95819 EEG AWAKE AND ASLEEP: CPT | Performed by: PSYCHIATRY & NEUROLOGY

## 2022-11-14 PROCEDURE — 85027 COMPLETE CBC AUTOMATED: CPT | Performed by: STUDENT IN AN ORGANIZED HEALTH CARE EDUCATION/TRAINING PROGRAM

## 2022-11-14 PROCEDURE — 93306 TTE W/DOPPLER COMPLETE: CPT | Performed by: INTERNAL MEDICINE

## 2022-11-14 RX ORDER — CYANOCOBALAMIN 1000 UG/ML
1000 INJECTION, SOLUTION INTRAMUSCULAR; SUBCUTANEOUS ONCE
Status: COMPLETED | OUTPATIENT
Start: 2022-11-14 | End: 2022-11-14

## 2022-11-14 RX ADMIN — LORAZEPAM 0.25 MG: 0.5 TABLET ORAL at 21:31

## 2022-11-14 RX ADMIN — PANTOPRAZOLE SODIUM 40 MG: 40 TABLET, DELAYED RELEASE ORAL at 09:06

## 2022-11-14 RX ADMIN — ASPIRIN 81 MG: 81 TABLET, CHEWABLE ORAL at 09:06

## 2022-11-14 RX ADMIN — LORAZEPAM 0.25 MG: 0.5 TABLET ORAL at 09:06

## 2022-11-14 RX ADMIN — CYANOCOBALAMIN 1000 MCG: 1000 INJECTION, SOLUTION INTRAMUSCULAR; SUBCUTANEOUS at 17:01

## 2022-11-14 NOTE — CASE MANAGEMENT/SOCIAL WORK
Continued Stay Note  [facility]     Patient Name: Charo Thomas  MRN: [id number]  Today's Date: 11/14/2022    Admit Date: 11/12/2022    Plan: [facility] - has pre-cert   Discharge Plan     Row Name 11/14/22 1358       Plan    Plan [facility] - has pre-cert    Patient/Family in Agreement with Plan yes    Plan Comments Spoke with daughter Hanna at bedside.  She requests referral to [facility].  Spoke with Christa - they can accept and have gotten pre-cert from [facility].  Daughter is aware.  MD anticipates DC tomorrow.  Packet in CCP office.  Siomara BERG             Expected Discharge Date and Time     Expected Discharge Date Expected Discharge Time    Nov 14, 2022             Becky S. [place], RN

## 2022-11-14 NOTE — PLAN OF CARE
Goal Outcome Evaluation:              Outcome Evaluation: Received orders for BSE per CVA protocol. MRI and CT were negative for acute findings. Patient has since passed RN swallow screen and, per RN, has been tolerating a regular diet, thin liquids, and her medications orally with no concerns. ST to sign off at this time due to patient being at baseline swallow status; however, please re-consult as needed.

## 2022-11-14 NOTE — DISCHARGE PLACEMENT REQUEST
"Charo Lopes (89 y.o. Female)     Date of Birth   04/03/1929    Social Security Number       Address   [address] [place] [address]    Home Phone   [phone]    MRN   [id number]       [facility]    Marital Status                               Admission Date   11/12/22    Admission Type   Emergency    Admitting Provider   Mariaelena Thomas MD    Attending Provider   Darrell Lee MD    Department, Room/Bed   [address], S613/1       Discharge Date       Discharge Disposition       Discharge Destination                               Attending Provider: Darrell Lee MD    Allergies: Cortisone, Diphenhydramine Hcl (Sleep), Epinephrine, Penicillins, Sulfa Antibiotics    Isolation: None   Infection: None   Code Status: CPR    Ht: 157.5 cm (62\")   Wt: 56.2 kg (124 lb)    Admission Cmt: None   Principal Problem: Syncope and collapse [R55]                 Active Insurance as of 11/12/2022     Primary Coverage     Payor Plan Insurance Group Employer/Plan Group    [facility] MEDICARE REPLACEMENT [facility] MEDICARE REPLACEMENT [id number]     Payor Plan Address Payor Plan Phone Number Payor Plan Fax Number Effective Dates    [address] [address]   1/1/2017 - None Entered    [facility] [id number]       Subscriber Name Subscriber Birth Date Member ID       CHARO LOPES 4/3/1929 [id number]                 Emergency Contacts      (Rel.) Home Phone Work Phone Mobile Phone    [employer] ([employer] (Daughter) [phone] -- --              "

## 2022-11-14 NOTE — PROGRESS NOTES
Name: Charo Thomas ADMIT: 2022   : 4/3/1929  PCP: Gal Ortega MD    MRN: [id number] LOS: 0 days   AGE/SEX: 89 y.o. female  ROOM: [facility]     Subjective   Subjective   Patient seen this morning, no acute events overnight.  Sitting up in recliner, eating breakfast.  Daughter present at bedside.  Denies complaints.    Review of Systems   as above  Objective   Objective   Vital Signs  Temp:  [97.4 °F (36.3 °C)-98.3 °F (36.8 °C)] 98.3 °F (36.8 °C)  Heart Rate:  [70-99] 82  Resp:  [16-18] 16  BP: (119-173)/(71-91) 170/82  SpO2:  [93 %-98 %] 97 %  on   ;   Device (Oxygen Therapy): room air  Body mass index is 22.68 kg/m².  Physical Exam    General: Pleasant, sitting up in recliner, eating breakfast  HEENT: Normocephalic, atraumatic  CV: Regular rate and rhythm, no murmurs rubs or gallops  Lungs: Clear to auscultation bilaterally, no wheezing  Abdomen: Soft, nontender, nondistended  Extremities: No significant peripheral edema , no cyanosis     Results Review     I reviewed the patient's new clinical results.  Results from last 7 days   Lab Units 22  0656 22  0750 22  1139   WBC 10*3/mm3 6.67 8.79 10.45   HEMOGLOBIN g/dL 11.7* 12.7 14.3   PLATELETS 10*3/mm3 197 212 241     Results from last 7 days   Lab Units 22  0656 22  0750 22  1139   SODIUM mmol/L 142 139 142   POTASSIUM mmol/L 4.4 3.1* 3.5   CHLORIDE mmol/L 109* 102 102   CO2 mmol/L 26.8 26.4 25.5   BUN mg/dL 16 17 16   CREATININE mg/dL 0.81 0.86 1.00   GLUCOSE mg/dL 98 98 128*   Estimated Creatinine Clearance: 38.5 mL/min (by C-G formula based on SCr of 0.81 mg/dL).  Results from last 7 days   Lab Units 22  0656 22  0750 22  1139   ALBUMIN g/dL 3.20* 3.70 4.70   BILIRUBIN mg/dL 1.1 1.7* 1.2   ALK PHOS U/L 78 86 95   AST (SGOT) U/L 21 20 22   ALT (SGPT) U/L 10 9 9     Results from last 7 days   Lab Units 22  0656 22  0750 22  1139   CALCIUM mg/dL 9.2 9.1 10.1*   ALBUMIN g/dL  3.20* 3.70 4.70   MAGNESIUM mg/dL  --   --  1.8       COVID19   Date Value Ref Range Status   08/22/2022 Not Detected Not Detected - Ref. Range Final     Hemoglobin A1C   Date/Time Value Ref Range Status   11/13/2022 0750 5.30 4.80 - 5.60 % Final     Glucose   Date/Time Value Ref Range Status   11/13/2022 1922 130 70 - 130 mg/dL Final     Comment:     Meter: [id number] : [id number] Geoff Dudley NA   11/13/2022 1853 128 70 - 130 mg/dL Final     Comment:     Meter: [id number] : [id number] Katelin Pérez NA   11/13/2022 1204 118 70 - 130 mg/dL Final     Comment:     Meter: [id number] : [id number] Katelin Pérez NA   11/13/2022 0611 112 70 - 130 mg/dL Final     Comment:     Meter: [id number] : [id number] Lanre Salter NA   11/12/2022 2325 105 70 - 130 mg/dL Final     Comment:     Meter: [id number] : [id number] Lanre Salter NA   11/12/2022 1245 120 70 - 130 mg/dL Final     Comment:     Meter: [id number] : [id number] Kenneth Morgan RN       Adult transthoracic echo complete  •  Left ventricular systolic function is normal. Left ventricular ejection   fraction appears to be 51 - 55%.  •  Left ventricular diastolic function is consistent with (grade I)   impaired relaxation.  •  Saline test results are negative.  •  Estimated right ventricular systolic pressure from tricuspid   regurgitation is normal (<35 mmHg).    Scheduled Medications  aspirin, 81 mg, Oral, Daily  LORazepam, 0.25 mg, Oral, BID  pantoprazole, 40 mg, Oral, QAM    Infusions   Diet  Diet Regular       Assessment/Plan     Active Hospital Problems    Diagnosis  POA   • **Syncope and collapse [R55]  Yes   • Hypokalemia [E87.6]  Unknown   • Generalized anxiety disorder [F41.1]  Yes   • Gastroesophageal reflux disease [K21.9]  Yes   • Essential hypertension [I10]  Yes      Resolved Hospital Problems   No resolved problems to display.   Charo Thomas is a 89 y.o. female with PMH of HTN, HLD, breast cancer presenting to the ED for  evaluation of altered mental status and episodes of syncope.          Syncope/altered mental status: CT head, CTA of the head and neck with no acute findings.   EKG shows sinus rhythm, troponin negative x2.  Urinalysis and chest x-ray reviewed no signs of infection.  Afebrile.  WBC normal.  TSH normal.  MRI showed prominent diffuse atrophy and chronic small vessel ischemic  change. No evidence of acute infarct. Neurology evaluated.  Plan for EEG.  Ordered orthostatic vitals.  Echocardiogram 11/14 showed EF of 51-55%.  Grade 1 diastolic dysfunction.  EEG pending per neurology.      Hypokalemia: Resolved.  Monitor.    Cerebral atherosclerosis: Continue aspirin patient does not want statin.      Essential hypertension.    BP on the higher side..  Blood per medications has been discontinued previously secondary to dizziness.       Generalized anxiety disorder: Patient is on Ativan 0.25 mg twice daily.  Continue.    Discussed with patient, daughter,  Discussed in multidisciplinary rounds    DVT prophylax: SCDs  CODE STATUS: Full code  Disposition:  home with home health versus SNF pending clinical progress.  Likely in 1-2 days pending EEG.    I wore protective equipment throughout this patient encounter including a face mask, gloves and protective eyewear.  Hand hygiene was performed before donning protective equipment and after removal when leaving the room.      Dictated utilizing Dragon dictation        Darrell Lee MD  [facility]  11/14/22  09:58 EST

## 2022-11-14 NOTE — PLAN OF CARE
Goal Outcome Evaluation:  Plan of Care Reviewed With: patient        Progress: no change   VSS. Pt using purewick. Good UOP. Pt oriented x 2 but very confused. Easily reoriented but frequently forgets in just a few minutes. Pt tried to get up out of bed a few times over night and removed her heart monitor leads. No IV per MD order d/t pt pulling out several previously. Pt anxious about daughter's coat being left at bedside and not having a walker or any clothes/toilettries with her. Pt reassured several times and belongings moved to closet to keep patient from worrying. Pt finally asleep. Turns self in bed. No c/o pain. NIH remains zero. Eeg to be done today. Will most likely need rehab upon DC.

## 2022-11-14 NOTE — PROGRESS NOTES
"DOS: 2022  NAME: Charo Thomas   : 4/3/1929  PCP: Gal Ortega MD  Chief Complaint   Patient presents with   • Altered Mental Status     Stroke    Subjective: Patient starts at the bedside.  Patient apparently did not sleep well overnight.  Patient was reportedly trying to call her daughter multiple times overnight from the nurses call light, was frequently trying to get out of bed, and was removing her monitor leads.  She seems hyperactive today.  Pt seen in follow up today, however the problem is new to the examiner.      Objective:  Vital signs: /82   Pulse 82   Temp 98.3 °F (36.8 °C) (Oral)   Resp 16   Ht 157.5 cm (62\")   Wt 56.2 kg (124 lb)   SpO2 97%   BMI 22.68 kg/m²    General appearance: Well developed, well nourished, well groomed, alert and cooperative.   HEENT: Normocephalic.   Neck: Supple  Cardiac: Regular rate and rhythm.  Peripheral Vasculature: Radial pulses are equal and symmetric.  Chest Exam: Clear to auscultation bilaterally, no wheezes, no rhonchi.  Extremities: Normal, no edema.   Skin: No rashes or birthmarks.     Higher integrative function: Awake/alert, oriented to self and location.  Initially stated it was 2022.  Impaired recent memory, decreased attention/concentration.  Spontaneous speech, fund of vocabulary are normal.   CN II: Visual fields intact.  CN III IV VI: Extraocular movements are full without nystagmus. Pupils are equal, round, and reactive to light.   CN V: Normal facial sensation.  CN VII: Facial movements are symmetric, no weakness.   CN VIII: Auditory acuity is normal.   CN IX & X: Symmetric palatal movement.   CN XI: Sternocleidomastoid and trapezius are normal. No weakness.   CN XII: The tongue is midline.   Motor: Normal muscle strength, bulk, and tone in upper and lower extremities except left hip flexion limited by pain/decreased range of motion. No fasciculations, rigidity, spasticity or abnormal movements.   Sensation: " Intact/symmetric to light touch in arms and legs.  Station and gait: Mildly wide-based, antalgic, assist x1.  Muscle stretch reflexes:   Plantar reflexes are flexor bilaterally.   Coordination: Finger to nose test showed no dysmetria.     Scheduled Meds:aspirin, 81 mg, Oral, Daily  LORazepam, 0.25 mg, Oral, BID  pantoprazole, 40 mg, Oral, QAM      Continuous Infusions:   PRN Meds:.•  acetaminophen **OR** acetaminophen  •  bisacodyl  •  ondansetron  •  sodium chloride    Laboratory results:  Lab Results   Component Value Date    GLUCOSE 98 11/14/2022    CALCIUM 9.2 11/14/2022     11/14/2022    K 4.4 11/14/2022    CO2 26.8 11/14/2022     (H) 11/14/2022    BUN 16 11/14/2022    CREATININE 0.81 11/14/2022    EGFRIFAFRI 64 04/28/2017    EGFRIFNONA 45 (L) 02/10/2022    BCR 19.8 11/14/2022    ANIONGAP 6.2 11/14/2022     Lab Results   Component Value Date    WBC 6.67 11/14/2022    HGB 11.7 (L) 11/14/2022    HCT 34.1 11/14/2022    MCV 90.5 11/14/2022     11/14/2022     Lab Results   Component Value Date    CHOL 259 (H) 11/13/2022    CHOL 282 (H) 08/22/2022     Lab Results   Component Value Date    HDL 54 11/13/2022    HDL 51 09/29/2022    HDL 53 08/22/2022     Lab Results   Component Value Date     (H) 11/13/2022     (H) 09/29/2022     (H) 08/22/2022     Lab Results   Component Value Date    TRIG 106 11/13/2022    TRIG 174 (H) 09/29/2022    TRIG 169 (H) 08/22/2022         Lab 11/13/22  0750   HEMOGLOBIN A1C 5.30      Review and interpretation of imaging: MRI brain images viewed by me, generalized atrophy noted but no acute findings seen.  Adult transthoracic echo complete    Result Date: 11/14/2022  •  Left ventricular systolic function is normal. Left ventricular ejection fraction appears to be 51 - 55%. •  Left ventricular diastolic function is consistent with (grade I) impaired relaxation. •  Saline test results are negative. •  Estimated right ventricular systolic pressure from  tricuspid regurgitation is normal (<35 mmHg).     CT Head Without Contrast    Result Date: 11/12/2022  CT SCAN OF THE BRAIN WITHOUT CONTRAST  HISTORY: Confusion.  The CT scan was performed as an emergency procedure through the brain without contrast. There is prominent diffuse atrophy and chronic small vessel ischemic change similar to the study of 08/22/2022. There is no evidence of acute intracranial hemorrhage or mass effect. There is some polypoid mucosal thickening at the floor of the right maxillary sinus that is unchanged. The mastoid air cells are clear.      Radiation dose reduction techniques were utilized, including automated exposure control and exposure modulation based on body size.  This report was finalized on 11/12/2022 1:32 PM by Dr. Vaibhav Perez M.D.      CT Angiogram Neck    Result Date: 11/12/2022  CT HEAD, CTA HEAD AND NECK  HISTORY: Dizziness, history of previous vertebral artery dissection.  COMPARISON: CTA head and neck 08/22/2022.  TECHNIQUE: Initially CT was performed of the head. Subsequently CT angiography was performed of the head and neck following the intravenous administration of iodinated contrast with axial images as well as coronal and sagittal reformatted MIP images provided.  Radiation dose reduction techniques were utilized, including automated exposure control and exposure modulation based on body size.  FINDINGS:  CT head: There is no finding of acute infarct, hemorrhage, contusion or abnormal extra-axial collection. No hydrocephalus is present. No abnormal intracranial enhancement is seen.  CT angiography neck: The origins of the great vessels are widely patent.  The common and internal carotid arteries are without appreciable stenosis or finding of dissection. No significant internal carotid artery stenosis is present by NASCET criteria. Short segment of mild stenosis of the left vertebral artery at the approximate level of C4 is present, grossly unchanged since  08/22/2022. Aberrant right subclavian artery is present. Bilateral thyroid nodules are present, as before.  CT angiography head: The proximal anterior and posterior arterial circulations are without appreciable stenosis, aneurysm formation or occlusion.      1.  No finding of new stenosis or occlusion of the cervical or proximal intracranial arterial vasculature. No intracranial aneurysm evident. 2.  Mild stenosis of the left vertebral artery at the approximate level of C4, as before, favored to represent a partially calcified plaque. A short dissection flap is also possible. 3.  Other findings as above.  This report was finalized on 11/12/2022 8:59 PM by Dr. Chris Oshea M.D.      MRI Brain Without Contrast    Result Date: 11/13/2022  MR SCAN OF THE BRAIN WITHOUT CONTRAST  HISTORY: Previous history of vertebral artery dissection. Dizziness.  The MR scan was performed with sagittal and axial images without contrast. There is considerable diffuse atrophy and chronic small vessel ischemic change similar to the MRI scan dated 08/22/2022. There is no evidence of acute intracranial hemorrhage or mass effect and the diffusion sequence shows no evidence of acute infarct.  There are normal flow voids in the major vessels. There is some localized mucosal thickening at the floor the right maxillary sinus that is unchanged. The mastoid air cells are clear.  CONCLUSION: Prominent diffuse atrophy and chronic small vessel ischemic change. No evidence of acute infarct. See above discussion.  This report was finalized on 11/13/2022 10:25 AM by Dr. Vaibhav Perez M.D.      XR Chest 1 View    Result Date: 11/12/2022  ONE VIEW PORTABLE CHEST  HISTORY: Weakness and dizziness.  FINDINGS: The lungs are well-expanded and clear except for some minimal probable chronic interstitial prominence. The heart is top normal in size and no acute abnormality is seen.  This report was finalized on 11/12/2022 11:29 AM by Dr. Vaibhav Perez M.D.       CT Angiogram Head    Result Date: 11/12/2022  CT HEAD, CTA HEAD AND NECK  HISTORY: Dizziness, history of previous vertebral artery dissection.  COMPARISON: CTA head and neck 08/22/2022.  TECHNIQUE: Initially CT was performed of the head. Subsequently CT angiography was performed of the head and neck following the intravenous administration of iodinated contrast with axial images as well as coronal and sagittal reformatted MIP images provided.  Radiation dose reduction techniques were utilized, including automated exposure control and exposure modulation based on body size.  FINDINGS:  CT head: There is no finding of acute infarct, hemorrhage, contusion or abnormal extra-axial collection. No hydrocephalus is present. No abnormal intracranial enhancement is seen.  CT angiography neck: The origins of the great vessels are widely patent.  The common and internal carotid arteries are without appreciable stenosis or finding of dissection. No significant internal carotid artery stenosis is present by NASCET criteria. Short segment of mild stenosis of the left vertebral artery at the approximate level of C4 is present, grossly unchanged since 08/22/2022. Aberrant right subclavian artery is present. Bilateral thyroid nodules are present, as before.  CT angiography head: The proximal anterior and posterior arterial circulations are without appreciable stenosis, aneurysm formation or occlusion.      1.  No finding of new stenosis or occlusion of the cervical or proximal intracranial arterial vasculature. No intracranial aneurysm evident. 2.  Mild stenosis of the left vertebral artery at the approximate level of C4, as before, favored to represent a partially calcified plaque. A short dissection flap is also possible. 3.  Other findings as above.  This report was finalized on 11/12/2022 8:59 PM by Dr. Chris Oshea M.D.        Impression:  89-year-old right-handed female with HTN, HLD, osteoporosis, esophageal stricture,  and history of breast cancer who presented 11/12/2022 with confusion and episode of loss of consciousness.  Patient's daughter states the patient lives alone and is checked on frequently.  For the last couple of months the patient has had mild confusion requiring additional help from family.  Thursday night patient was noted to have difficulty speaking and was not herself but Friday morning she was noted to be okay.  Saturday morning when her daughter went to check on her the patient was half dressed and had with the bed.  Her daughter attempted to stand the patient up to change the bed but the patient noted she felt sick and daughter laid her back down and the patient began gurgling so her daughter sat her back up at which time patient's head went back, her eyes rolled back, and the patient passed out without associated seizure activity.  The patient came to within 1 minute.  BP was 165/92 on arrival.  Patient is not currently on any blood pressure medications following an admission which time she was seen by our service and August 2022 for dizziness felt secondary to hypotension.  Since that time her blood pressure has been running 140s to 150s, closely monitored by her daughter, without recurrent episodes of dizziness.  She has refused to take statins previously.  She is on Ativan 0.25 mg twice daily.  About a year ago her primary care doctor tried to wean her off of this but it was not tolerated by the patient.    Work-up:  MRI brain without contrast 11/13: Chronic diffuse atrophy and chronic small vessel disease but no acute findings.  CT head/neck: NoNew stenosis or LVO.  Mild left vertebral artery stenosis, favored to represent partially calcified plaque but short dissection flap is also possible radiology read.  Labs:TSH 2.6, , HDL 54, triglycerides 106, total cholesterol 259, hemoglobin A1c 5.30%, urinalysis showed no nitrites, no leukoesterase, positive for 1+ ketones and trace  protein.  Orthostatics: Lying /72, heart rate 82, sitting /118, heart rate 84, standing not documented.    Diagnosis:  Episode of loss of consciousness, consider seizure versus syncope  AMS- unclear what may have been causing this prior to admission but currently she seems to have hospital delirium and mild baseline cognitive impairment suspected    Plan:  Check orthostatics  Delirium precautions/interventions  Follow-up EEG  Check B12 level and RPR  Discussed with Dr. Jennings today.  Will follow

## 2022-11-14 NOTE — THERAPY EVALUATION
Patient Name: Charo Thomas  : 4/3/1929    MRN: [id number]                              Today's Date: 2022       Admit Date: 2022    Visit Dx:     ICD-10-CM ICD-9-CM   1. Syncope and collapse  R55 780.2   2. Acute dehydration  E86.0 276.51   3. Confusion  R41.0 298.9     Patient Active Problem List   Diagnosis   • Anxiety   • Benzodiazepine dependence (HCC)   • Generalized anxiety disorder   • Gastroesophageal reflux disease   • Essential hypertension   • Peripheral neuropathy   • Acoustic neuroma (HCC)   • Malignant neoplasm of overlapping sites of right breast in female, estrogen receptor positive (HCC)   • Vitamin B12 deficiency   • Radial artery aneurysm, left (HCC)   • Primary osteoarthritis of left knee   • Dizzy spells   • Dizziness   • Orthostasis   • Syncope and collapse   • Hypokalemia     Past Medical History:   Diagnosis Date   • Benign esophageal stricture    • Breast cancer (HCC) 2010    Right breast w/papillary features, primary tumor size 1.9 cm w/negative margins   • Breast cancer in male, right 2013    Right breast, stage I, invasive   • Diverticular disease of colon    • H. pylori infection    • H/O Migraines    • Hip arthrosis    • Hyperlipidemia    • Hyperplastic polyps of stomach    • Hypertension    • Knee swelling    • Osteoporosis    • Psychiatric disorder    • Vitamin B12 deficiency      Past Surgical History:   Procedure Laterality Date   • APPENDECTOMY     • BREAST BIOPSY Right    • BREAST LUMPECTOMY     • BREAST LUMPECTOMY WITH SENTINEL NODE BIOPSY Right    • COLONOSCOPY      Diverticular disease   • DILATATION AND CURETTAGE  7, 1971    x2      General Information     Row Name 22 1228          OT Time and Intention    Document Type evaluation  -CE     Mode of Treatment individual therapy;occupational therapy  -CE     Row Name 22 1228          General Information    Patient Profile Reviewed yes  -CE     Prior Level of Function  independent:;ADL's  assist with IADLs including med mgt, dtr always present when pt showers and assists with meal prep  -CE     Existing Precautions/Restrictions fall  -CE     Row Name 11/14/22 1228          Living Environment    People in Home alone  dtr checks on pt multiple times/day but not there 24/7  -CE     Row Name 11/14/22 1228          Home Main Entrance    Number of Stairs, Main Entrance none  -CE     Row Name 11/14/22 1228          Stairs Within Home, Primary    Number of Stairs, Within Home, Primary none  -CE     Row Name 11/14/22 1228          Cognition    Orientation Status (Cognition) oriented to;person;place;time  cues for month; able to state correct year  -CE     Row Name 11/14/22 1228          Safety Issues, Functional Mobility    Safety Issues Affecting Function (Mobility) impulsivity;insight into deficits/self-awareness;safety precaution awareness;safety precautions follow-through/compliance  -CE     Impairments Affecting Function (Mobility) cognition  -CE     Cognitive Impairments, Mobility Safety/Performance awareness, need for assistance;impulsivity;insight into deficits/self-awareness;judgment;problem-solving/reasoning;safety precaution follow-through  pt has pulled out multiple IVs since admission per chart  -CE           User Key  (r) = Recorded By, (t) = Taken By, (c) = Cosigned By    Initials Name Provider Type    CE Sherri Canseco OT Occupational Therapist                 Mobility/ADL's     Row Name 11/14/22 1233          Bed Mobility    Bed Mobility supine-sit;sit-supine  -CE     Supine-Sit Christian (Bed Mobility) verbal cues;minimum assist (75% patient effort);1 person assist  -CE     Sit-Supine Christian (Bed Mobility) minimum assist (75% patient effort);verbal cues  -CE     Bed Mobility, Safety Issues cognitive deficits limit understanding  -CE     Assistive Device (Bed Mobility) bed rails;head of bed elevated  -CE     Row Name 11/14/22 1233          Transfers     Transfers bed-chair transfer;sit-stand transfer  -CE     Row Name 11/14/22 1233          Bed-Chair Transfer    Bed-Chair [place] (Transfers) nonverbal cues (demo/gesture);verbal cues;minimum assist (75% patient effort);1 person assist  -CE     Assistive Device (Bed-Chair Transfers) walker, front-wheeled  -CE     Row Name 11/14/22 1233          Sit-Stand Transfer    Sit-Stand [place] (Transfers) minimum assist (75% patient effort);1 person assist;verbal cues;nonverbal cues (demo/gesture)  -CE     Comment, (Sit-Stand Transfer) cues for hand placement  -CE     Row Name 11/14/22 1233          Functional Mobility    Functional Mobility- Comment pt able to ambulate ~5 feet from stretcher to bed after return from being off unit. required use of FWW and Wang x 1 for safety  -CE     Row Name 11/14/22 1233          Activities of Daily Living    BADL Assessment/Intervention lower body dressing;feeding;toileting  -CE     Row Name 11/14/22 1233          Lower Body Dressing Assessment/Training    [place] Level (Lower Body Dressing) don;socks;maximum assist (25% patient effort)  -CE     Comment, (Lower Body Dressing) completed seated EOB unsupported  -CE     Row Name 11/14/22 1233          Self-Feeding Assessment/Training    [place] Level (Feeding) supervision;set up  -CE     Position (Self-Feeding) supported sitting  -CE     Row Name 11/14/22 1233          Toileting Assessment/Training    [place] Level (Toileting) dependent (less than 25% patient effort)  -CE     Comment, (Toileting) pt using brief and pure wick; per dtr she uses brief w/ large pad at home  -CE           User Key  (r) = Recorded By, (t) = Taken By, (c) = Cosigned By    Initials Name Provider Type    CE Sherri Canseco OT Occupational Therapist               Obj/Interventions     Row Name 11/14/22 1240          Sensory Assessment (Somatosensory)    Sensory Assessment (Somatosensory) sensation intact  -     Row Name 11/14/22 1241           Range of Motion Comprehensive    General Range of Motion no range of motion deficits identified  -CE     Row Name 11/14/22 1240          Strength Comprehensive (MMT)    Comment, General Manual Muscle Testing (MMT) Assessment grossly 4/5 throughout during functional tasks  -CE           User Key  (r) = Recorded By, (t) = Taken By, (c) = Cosigned By    Initials Name Provider Type    CE Sherri Canseco OT Occupational Therapist               Goals/Plan     Row Name 11/14/22 1243          Transfer Goal 1 (OT)    Activity/Assistive Device (Transfer Goal 1, OT) toilet  -CE     [place]andon Level/Cues Needed (Transfer Goal 1, OT) modified independence  -CE     Time Frame (Transfer Goal 1, OT) short term goal (STG);2 weeks  -CE     [facility] Name 11/14/22 1243          Dressing Goal 1 (OT)    Activity/Device (Dressing Goal 1, OT) dressing skills, all  -CE     Brandon/Cues Needed (Dressing Goal 1, OT) modified independence  -CE     Time Frame (Dressing Goal 1, OT) short term goal (STG);2 weeks  -CE     Row Name 11/14/22 1243          Toileting Goal 1 (OT)    Activity/Device (Toileting Goal 1, OT) toileting skills, all  -CE     [place] Level/Cues Needed (Toileting Goal 1, OT) modified independence  -CE     Time Frame (Toileting Goal 1, OT) short term goal (STG);2 weeks  -CE     [facility] Name 11/14/22 1243          Therapy Assessment/Plan (OT)    Planned Therapy Interventions (OT) occupation/activity based interventions;strengthening exercise;transfer/mobility retraining;activity tolerance training;functional balance retraining;adaptive equipment training  -CE           User Key  (r) = Recorded By, (t) = Taken By, (c) = Cosigned By    Initials Name Provider Type    CE Sherri Canseco OT Occupational Therapist               Clinical Impression     Row Name 11/14/22 1240          Pain Assessment    Pretreatment Pain Rating 0/10 - no pain  -CE     Posttreatment Pain Rating 0/10 - no pain  -CE     Row Name 11/14/22 1240           Plan of Care Review    Plan of Care Reviewed With patient;daughter  -CE     Outcome Evaluation Pt is 89 y/o F who presents after syncope and with increased confusion. Dtr present during OT eval and helpful regarding providing PLOF. Pt required maxA for LB dressing and Wang for basic transfers and to ambulate short distance in room this date. Discussed con't therapy while in-house and short stay elsewhere prior to return to home alone with dtr and dtr agreeable to plan of care. Will con't to follow for stated goals and rec'd d/c to SNF.  -CE     Row Name 11/14/22 1246          Therapy Assessment/Plan (OT)    Rehab Potential (OT) fair, will monitor progress closely  -CE     Criteria for Skilled Therapeutic Interventions Met (OT) yes  -CE     Therapy Frequency (OT) 5 times/wk  -CE     Row Name 11/14/22 1244          Therapy Plan Review/Discharge Plan (OT)    Equipment Needs Upon Discharge (OT) --  defer to facility  -CE     Anticipated Discharge Disposition (OT) skilled nursing facility  -CE     Row Name 11/14/22 1241          Vital Signs    O2 Delivery Pre Treatment room air  -CE     O2 Delivery Intra Treatment room air  -CE     O2 Delivery Post Treatment room air  -CE     Pre Patient Position Supine  -CE     Intra Patient Position Standing  -CE     Post Patient Position Sitting  -CE     Row Name 11/14/22 1243          Positioning and Restraints    Pre-Treatment Position in bed  on stretcher after being off unit  -CE     Post Treatment Position chair  -CE     In Chair notified nsg;sitting;call light within reach;encouraged to call for assist;exit alarm on;with family/caregiver  -CE           User Key  (r) = Recorded By, (t) = Taken By, (c) = Cosigned By    Initials Name Provider Type    CE Sherri Canseco, OT Occupational Therapist               Outcome Measures     Row Name 11/14/22 6282          How much help from another is currently needed...    Putting on and taking off regular lower body clothing? 2  -CE      Bathing (including washing, rinsing, and drying) 3  -CE     Toileting (which includes using toilet bed pan or urinal) 3  -CE     Putting on and taking off regular upper body clothing 3  -CE     Taking care of personal grooming (such as brushing teeth) 3  -CE     Eating meals 3  -CE     AM-PAC 6 Clicks Score (OT) 17  -CE     Row Name 11/14/22 1244          Functional Assessment    Outcome Measure Options AM-PAC 6 Clicks Daily Activity (OT)  -CE           User Key  (r) = Recorded By, (t) = Taken By, (c) = Cosigned By    Initials Name Provider Type    CE Sherri Canseco OT Occupational Therapist                Occupational Therapy Education     Title: PT OT SLP Therapies (In Progress)     Topic: Occupational Therapy (Done)     Point: ADL training (Done)     Description:   Instruct learner(s) on proper safety adaptation and remediation techniques during self care or transfers.   Instruct in proper use of assistive devices.              Learning Progress Summary           Patient RENEE Pena VU,NR by CE at 11/14/2022 1245   Family RENEE Pena VU,NR by CE at 11/14/2022 1245                   Point: Home exercise program (Done)     Description:   Instruct learner(s) on appropriate technique for monitoring, assisting and/or progressing therapeutic exercises/activities.              Learning Progress Summary           Patient Adyer E, VU,NR by CE at 11/14/2022 1245   Family RENEE Pena VU,NR by CE at 11/14/2022 1245                   Point: Precautions (Done)     Description:   Instruct learner(s) on prescribed precautions during self-care and functional transfers.              Learning Progress Summary           Patient Adyer, E, VU,NR by CE at 11/14/2022 1245   Family RENEE Pena VU,NR by CE at 11/14/2022 1245                   Point: Body mechanics (Done)     Description:   Instruct learner(s) on proper positioning and spine alignment during self-care, functional mobility activities and/or exercises.              Learning  Progress Summary           Patient RENEE ePna, PAPO,NR by CE at 11/14/2022 1245   Family Jean, E, VU,NR by CE at 11/14/2022 1245                               User Key     Initials Effective Dates Name Provider Type Discipline    CE 10/17/22 -  Sherri Canseco OT Occupational Therapist OT              OT Recommendation and Plan  Planned Therapy Interventions (OT): occupation/activity based interventions, strengthening exercise, transfer/mobility retraining, activity tolerance training, functional balance retraining, adaptive equipment training  Therapy Frequency (OT): 5 times/wk  Plan of Care Review  Plan of Care Reviewed With: patient, daughter  Outcome Evaluation: Pt is 89 y/o F who presents after syncope and with increased confusion. Dtr present during OT eval and helpful regarding providing PLOF. Pt required maxA for LB dressing and Wang for basic transfers and to ambulate short distance in room this date. Discussed con't therapy while in-house and short stay elsewhere prior to return to home alone with dtr and dtr agreeable to plan of care. Will con't to follow for stated goals and rec'd d/c to SNF.     Time Calculation:    Time Calculation- OT     Row Name 11/14/22 1246             Time Calculation- OT    OT Start Time 0829  -CE      OT Stop Time 0855  -CE      OT Time Calculation (min) 26 min  -CE      Total Timed Code Minutes- OT 15 minute(s)  -CE      OT Received On 11/14/22  -CE      OT Goal Re-Cert Due Date 11/28/22  -CE         Timed Charges    28754 - OT Therapeutic Activity Minutes 15  -CE         Total Minutes    Timed Charges Total Minutes 15  -CE       Total Minutes 15  -CE            User Key  (r) = Recorded By, (t) = Taken By, (c) = Cosigned By    Initials Name Provider Type    CE Sherri Canseco OT Occupational Therapist              Therapy Charges for Today     Code Description Service Date Service Provider Modifiers Qty    09236528243  OT THERAPEUTIC ACT EA 15 MIN 11/14/2022 Harleen  Sherri OT GO 1    [id number]  OT EVAL MOD COMPLEXITY 2 11/14/2022 Sherri Canseco OT GO 1               Sherri Canseco OT  11/14/2022

## 2022-11-14 NOTE — PLAN OF CARE
Goal Outcome Evaluation: Pleasantly confused. Room air. New IV access in place.  EEG completed as well as echo.  NIH assessment and Q6 hour accuchecks dc'd.  Anticipated DC in 1-2 days.  Call light in reach.

## 2022-11-14 NOTE — PLAN OF CARE
Goal Outcome Evaluation:  Plan of Care Reviewed With: patient, daughter           Outcome Evaluation: Pt is 89 y/o F who presents after syncope and with increased confusion. Dtr present during OT eval and helpful regarding providing PLOF. Pt required maxA for LB dressing and Wang for basic transfers and to ambulate short distance in room this date. Discussed con't therapy while in-house and short stay elsewhere prior to return to home alone with dtr and dtr agreeable to plan of care. Will con't to follow for stated goals and rec'd d/c to SNF.

## 2022-11-15 VITALS
DIASTOLIC BLOOD PRESSURE: 88 MMHG | WEIGHT: 124 LBS | HEART RATE: 73 BPM | HEIGHT: 62 IN | OXYGEN SATURATION: 97 % | SYSTOLIC BLOOD PRESSURE: 163 MMHG | BODY MASS INDEX: 22.82 KG/M2 | TEMPERATURE: 97.8 F | RESPIRATION RATE: 16 BRPM

## 2022-11-15 PROCEDURE — 99213 OFFICE O/P EST LOW 20 MIN: CPT | Performed by: NURSE PRACTITIONER

## 2022-11-15 PROCEDURE — 25010000002 CYANOCOBALAMIN PER 1000 MCG: Performed by: STUDENT IN AN ORGANIZED HEALTH CARE EDUCATION/TRAINING PROGRAM

## 2022-11-15 PROCEDURE — 96372 THER/PROPH/DIAG INJ SC/IM: CPT

## 2022-11-15 PROCEDURE — G0378 HOSPITAL OBSERVATION PER HR: HCPCS

## 2022-11-15 RX ORDER — CHOLECALCIFEROL (VITAMIN D3) 125 MCG
1000 CAPSULE ORAL DAILY
Status: DISCONTINUED | OUTPATIENT
Start: 2022-11-15 | End: 2022-11-15 | Stop reason: HOSPADM

## 2022-11-15 RX ORDER — ASPIRIN 81 MG/1
81 TABLET, CHEWABLE ORAL DAILY
Start: 2022-11-16 | End: 2023-02-14

## 2022-11-15 RX ORDER — CYANOCOBALAMIN 1000 UG/ML
1000 INJECTION, SOLUTION INTRAMUSCULAR; SUBCUTANEOUS ONCE
Status: COMPLETED | OUTPATIENT
Start: 2022-11-15 | End: 2022-11-15

## 2022-11-15 RX ORDER — LORAZEPAM 0.5 MG/1
0.25 TABLET ORAL 2 TIMES DAILY
Qty: 3 TABLET | Refills: 0 | Status: SHIPPED | OUTPATIENT
Start: 2022-11-15 | End: 2022-11-18

## 2022-11-15 RX ADMIN — Medication 1000 MCG: at 16:32

## 2022-11-15 RX ADMIN — CYANOCOBALAMIN 1000 MCG: 1000 INJECTION, SOLUTION INTRAMUSCULAR; SUBCUTANEOUS at 16:32

## 2022-11-15 RX ADMIN — LORAZEPAM 0.25 MG: 0.5 TABLET ORAL at 09:19

## 2022-11-15 RX ADMIN — ASPIRIN 81 MG: 81 TABLET, CHEWABLE ORAL at 09:19

## 2022-11-15 RX ADMIN — PANTOPRAZOLE SODIUM 40 MG: 40 TABLET, DELAYED RELEASE ORAL at 06:38

## 2022-11-15 NOTE — DISCHARGE SUMMARY
Patient Name: Charo Thomas  : 4/3/1929  MRN: [id number]    Date of Admission: 2022  Date of Discharge:  11/15/2022  Primary Care Physician: Gal Ortega MD      Chief Complaint:   Altered Mental Status      Discharge Diagnoses     Active Hospital Problems    Diagnosis  POA   • **Syncope and collapse [R55]  Yes   • Orthostasis [I95.1]  Yes   • Generalized anxiety disorder [F41.1]  Yes   • Gastroesophageal reflux disease [K21.9]  Yes   • Essential hypertension [I10]  Yes      Resolved Hospital Problems    Diagnosis Date Resolved POA   • Hypokalemia [E87.6] 11/15/2022 Unknown        Hospital Course     Resolved Hospital Problems   No resolved problems to display.     Charo Thomas is a 89 y.o. female with PMH of HTN, HLD, breast cancer presenting to the ED for evaluation of altered mental status and episode of syncope.           Syncope/altered mental status: CT head, CTA of the head and neck with no acute findings.   EKG shows sinus rhythm, troponin negative x2.  TSH was normal.  MRI showed prominent diffuse atrophy and chronic small vessel ischemicchange. No evidence of acute infarct. N Echocardiogram  showed EF of 51-55%.  Grade 1 diastolic dysfunction.  Neurology evaluated.  EEG was obtained which did not show  epileptic activity. Patient has likely underlying dementia, with t imaging showing profuse diffuse atrophy with chronic ischemic changes.  Mentation improved, at baseline, patient becomes intermittently confused, likely secondary to undiagnosed underlying dementia.  No clear etiology of altered mental status and syncope determined.  Patient does have orthostatic hypotension, however presentation did not seem to be secondary to orthostasis, however cannot rule out.  Continue to hold blood for medication secondary to orthostatic hypotension.    Orthostatic hypotension/dizziness.  Patient's BP orthostatic with blood pressures laying 156/75 heart rate of 80, and dropping down to 112/111  with heart rate of 76.  Previously was on blood pressures medication which was discontinued secondary to dizziness, dizziness improved afterwards per daughter.  Patient is to stay hydrated, and taking precautions such as sitting up slowly.  Patient blood pressure systolic stays in 150s-160s,  we will need to continue to hold blood pressure medications due to orthostatic hypotension and improvement of symptoms after stopping.  Risk of blood pressure medication outweigh the benefits.       Low normal B12 level.  B12 level 245.  Low end of normal.  B12 level goal for the patient above 400.  Status post IM B12 injection.  Started on p.o. B12 daily.  Follow-up levels in 3 months to monitor response.        Cerebral atherosclerosis: seen on imaging, Continue aspirin, patient does not want statin.        Essential hypertension.    BP on the higher side..  Blood per medications has been discontinued previously secondary to dizziness.         Generalized anxiety disorder: Patient is on Ativan 0.25 mg twice daily.  Continue.             Day of Discharge     Subjective:  Patient lying in bed, not in distress, pleasant, denies complaints.  Oriented x3 this morning.  Very talkative.    Physical Exam:  Temp:  [97.7 °F (36.5 °C)-98.4 °F (36.9 °C)] 98.1 °F (36.7 °C)  Heart Rate:  [73-97] 76  Resp:  [16-17] 16  BP: (112-158)/() 147/81  Body mass index is 22.68 kg/m².  Physical Exam    General: Alert and oriented x3, no acute distress, frail, elderly, pleasant,  HEENT: Normocephalic, atraumatic  CV: RRR, no murmurs  Lungs: Clear to auscultation bilaterally, no crackles or wheezes  Abdomen: Soft, nontender, nondistended  Extremities: No significant peripheral edema , no cyanosis     Consultants     Consult Orders (all) (From admission, onward)     Start     Ordered    11/13/22 0836  Inpatient Neurology Consult General  Once        Specialty:  Neurology  Provider:  Jose Antonio Jaffe MD    11/13/22 0835    11/1934   Notify Stroke Coordinator  Once        Provider:  (Not yet assigned)    11/1934 11/1934  Inpatient Rehab Admission Consult  Once        Provider:  (Not yet assigned)    11/1934 11/1934  Consult to Case Management   Once        Provider:  (Not yet assigned)    11/1934 11/1934  Consult to Diabetes Educator  Once,   Status:  Canceled        Provider:  (Not yet assigned)    11/1934 11/1934  Inpatient Neurology Consult Stroke  Once        Specialty:  Neurology  Provider:  Jose Antonio Jaffe MD    11/1934 11/12/22 1436  [facility] (on-call MD unless specified) Details  Once        Specialty:  Hospitalist  Provider:  (Not yet assigned)    11/12/22 1435 11/12/22 1436  IP Palliative Care Nurse Consult  Once,   Status:  Canceled        Provider:  (Not yet assigned)    11/12/22 1435 11/12/22 1236  Inpatient Neurology Consult General  Once        Specialty:  Neurology  Provider:  (Not yet assigned)    11/12/22 1235              Procedures     * Surgery not found *      Imaging Results (All)     Procedure Component Value Units Date/Time    MRI Brain Without Contrast [[id number]] Collected: 11/13/22 1021     Updated: 11/13/22 1028    Narrative:      MR SCAN OF THE BRAIN WITHOUT CONTRAST     HISTORY: Previous history of vertebral artery dissection. Dizziness.     The MR scan was performed with sagittal and axial images without  contrast. There is considerable diffuse atrophy and chronic small vessel  ischemic change similar to the MRI scan dated 08/22/2022. There is no  evidence of acute intracranial hemorrhage or mass effect and the  diffusion sequence shows no evidence of acute infarct.     There are normal flow voids in the major vessels. There is some  localized mucosal thickening at the floor the right maxillary sinus that  is unchanged. The mastoid air cells are clear.     CONCLUSION: Prominent diffuse atrophy and chronic small vessel  ischemic  change. No evidence of acute infarct. See above discussion.     This report was finalized on 11/13/2022 10:25 AM by Dr. Vaibhav Perez M.D.       CT Angiogram Neck [[id number]] Collected: 11/12/22 1425     Updated: 11/12/22 2102    Narrative:      CT HEAD, CTA HEAD AND NECK     HISTORY: Dizziness, history of previous vertebral artery dissection.     COMPARISON: CTA head and neck 08/22/2022.     TECHNIQUE: Initially CT was performed of the head. Subsequently CT  angiography was performed of the head and neck following the intravenous  administration of iodinated contrast with axial images as well as  coronal and sagittal reformatted MIP images provided.  Radiation dose  reduction techniques were utilized, including automated exposure control  and exposure modulation based on body size.     FINDINGS:     CT head:   There is no finding of acute infarct, hemorrhage, contusion or abnormal  extra-axial collection. No hydrocephalus is present. No abnormal  intracranial enhancement is seen.     CT angiography neck:   The origins of the great vessels are widely patent.  The common and  internal carotid arteries are without appreciable stenosis or finding of  dissection. No significant internal carotid artery stenosis is present  by NASCET criteria. Short segment of mild stenosis of the left vertebral  artery at the approximate level of C4 is present, grossly unchanged  since 08/22/2022. Aberrant right subclavian artery is present. Bilateral  thyroid nodules are present, as before.     CT angiography head:   The proximal anterior and posterior arterial circulations are without  appreciable stenosis, aneurysm formation or occlusion.       Impression:      1.  No finding of new stenosis or occlusion of the cervical or proximal  intracranial arterial vasculature. No intracranial aneurysm evident.  2.  Mild stenosis of the left vertebral artery at the approximate level  of C4, as before, favored to represent a partially  calcified plaque. A  short dissection flap is also possible.  3.  Other findings as above.     This report was finalized on 11/12/2022 8:59 PM by Dr. Chris Oshea M.D.       CT Angiogram Head [[id number]] Collected: 11/12/22 1425     Updated: 11/12/22 2102    Narrative:      CT HEAD, CTA HEAD AND NECK     HISTORY: Dizziness, history of previous vertebral artery dissection.     COMPARISON: CTA head and neck 08/22/2022.     TECHNIQUE: Initially CT was performed of the head. Subsequently CT  angiography was performed of the head and neck following the intravenous  administration of iodinated contrast with axial images as well as  coronal and sagittal reformatted MIP images provided.  Radiation dose  reduction techniques were utilized, including automated exposure control  and exposure modulation based on body size.     FINDINGS:     CT head:   There is no finding of acute infarct, hemorrhage, contusion or abnormal  extra-axial collection. No hydrocephalus is present. No abnormal  intracranial enhancement is seen.     CT angiography neck:   The origins of the great vessels are widely patent.  The common and  internal carotid arteries are without appreciable stenosis or finding of  dissection. No significant internal carotid artery stenosis is present  by NASCET criteria. Short segment of mild stenosis of the left vertebral  artery at the approximate level of C4 is present, grossly unchanged  since 08/22/2022. Aberrant right subclavian artery is present. Bilateral  thyroid nodules are present, as before.     CT angiography head:   The proximal anterior and posterior arterial circulations are without  appreciable stenosis, aneurysm formation or occlusion.       Impression:      1.  No finding of new stenosis or occlusion of the cervical or proximal  intracranial arterial vasculature. No intracranial aneurysm evident.  2.  Mild stenosis of the left vertebral artery at the approximate level  of C4, as before, favored to  represent a partially calcified plaque. A  short dissection flap is also possible.  3.  Other findings as above.     This report was finalized on 11/12/2022 8:59 PM by Dr. Chris Oshea M.D.       CT Head Without Contrast [[id number]] Collected: 11/12/22 1311     Updated: 11/12/22 1335    Narrative:      CT SCAN OF THE BRAIN WITHOUT CONTRAST     HISTORY: Confusion.     The CT scan was performed as an emergency procedure through the brain  without contrast. There is prominent diffuse atrophy and chronic small  vessel ischemic change similar to the study of 08/22/2022. There is no  evidence of acute intracranial hemorrhage or mass effect. There is some  polypoid mucosal thickening at the floor of the right maxillary sinus  that is unchanged. The mastoid air cells are clear.                 Radiation dose reduction techniques were utilized, including automated  exposure control and exposure modulation based on body size.     This report was finalized on 11/12/2022 1:32 PM by Dr. Vaibhav Perez M.D.       XR Chest 1 View [[id number]] Collected: 11/12/22 1128     Updated: 11/12/22 1132    Narrative:      ONE VIEW PORTABLE CHEST     HISTORY: Weakness and dizziness.     FINDINGS: The lungs are well-expanded and clear except for some minimal  probable chronic interstitial prominence. The heart is top normal in  size and no acute abnormality is seen.     This report was finalized on 11/12/2022 11:29 AM by Dr. Vaibhav Perez M.D.             Results for orders placed during the hospital encounter of 11/12/22    Adult transthoracic echo complete    Interpretation Summary  •  Left ventricular systolic function is normal. Left ventricular ejection fraction appears to be 51 - 55%.  •  Left ventricular diastolic function is consistent with (grade I) impaired relaxation.  •  Saline test results are negative.  •  Estimated right ventricular systolic pressure from tricuspid regurgitation is normal (<35 mmHg).    Pertinent Labs      Results from last 7 days   Lab Units 11/14/22  0656 11/13/22  0750 11/12/22  1139   WBC 10*3/mm3 6.67 8.79 10.45   HEMOGLOBIN g/dL 11.7* 12.7 14.3   PLATELETS 10*3/mm3 197 212 241     Results from last 7 days   Lab Units 11/14/22  0656 11/13/22  0750 11/12/22  1139   SODIUM mmol/L 142 139 142   POTASSIUM mmol/L 4.4 3.1* 3.5   CHLORIDE mmol/L 109* 102 102   CO2 mmol/L 26.8 26.4 25.5   BUN mg/dL 16 17 16   CREATININE mg/dL 0.81 0.86 1.00   GLUCOSE mg/dL 98 98 128*   Estimated Creatinine Clearance: 38.5 mL/min (by C-G formula based on SCr of 0.81 mg/dL).  Results from last 7 days   Lab Units 11/14/22  0656 11/13/22  0750 11/12/22  1139   ALBUMIN g/dL 3.20* 3.70 4.70   BILIRUBIN mg/dL 1.1 1.7* 1.2   ALK PHOS U/L 78 86 95   AST (SGOT) U/L 21 20 22   ALT (SGPT) U/L 10 9 9     Results from last 7 days   Lab Units 11/14/22  0656 11/13/22  0750 11/12/22  1139   CALCIUM mg/dL 9.2 9.1 10.1*   ALBUMIN g/dL 3.20* 3.70 4.70   MAGNESIUM mg/dL  --   --  1.8       Results from last 7 days   Lab Units 11/12/22  1139   TROPONIN T ng/mL <0.010       Results from last 7 days   Lab Units 11/13/22  0750   CHOLESTEROL mg/dL 259*   TRIGLYCERIDES mg/dL 106   HDL CHOL mg/dL 54   LDL CHOL mg/dL 186*             Test Results Pending at Discharge       Discharge Details        Discharge Medications      New Medications      Instructions Start Date   aspirin 81 MG chewable tablet   81 mg, Oral, Daily   Start Date: November 16, 2022     cyanocobalamin 1000 MCG tablet  Commonly known as: VITAMIN B-12   1,000 mcg, Oral, Daily         Continue These Medications      Instructions Start Date   lansoprazole 30 MG capsule  Commonly known as: PREVACID   Take 1 capsule by mouth once daily      LORazepam 0.5 MG tablet  Commonly known as: ATIVAN   0.25 mg, Oral, 2 Times Daily      VITAMIN-B COMPLEX PO   Oral, Daily             Allergies   Allergen Reactions   • Cortisone    • Diphenhydramine Hcl (Sleep)    • Epinephrine    • Penicillins    • Sulfa  Antibiotics        Discharge Disposition:  Skilled Nursing Facility (DC - External)      Discharge Diet:  Diet Order   Procedures   • Diet Regular Texture (IDDSI 7); Regular Consistency; Regular/House Diet       Discharge Activity:       CODE STATUS:    Code Status and Medical Interventions:   Ordered at: 11/1934     Code Status (Patient has no pulse and is not breathing):    CPR (Attempt to Resuscitate)     Medical Interventions (Patient has pulse or is breathing):    Full       No future appointments.   Follow-up Information     Gal Ortega MD Follow up in 1 week(s).    Specialty: Family Medicine  Contact information:  [address]  [address] [address] [phone]                         Time Spent on Discharge:  Greater than 30 minutes      Darrell Lee MD  [facility]  11/15/22  11:24 EST

## 2022-11-15 NOTE — PROGRESS NOTES
"DOS: 11/15/2022  NAME: Charo Thomas   : 4/3/1929  PCP: Gal Ortega MD  Chief Complaint   Patient presents with   • Altered Mental Status     Stroke    Subjective: Less hyperactive today. Reports little sleep, notes she still feels confused. Denies headache. No family at bedside.     Objective:  Vital signs: /81   Pulse 76   Temp 98.1 °F (36.7 °C) (Oral)   Resp 16   Ht 157.5 cm (62\")   Wt 56.2 kg (124 lb)   SpO2 97%   BMI 22.68 kg/m²    General appearance: Well developed, well nourished, alert and cooperative.   HEENT: Normocephalic.   Neck: Supple  Cardiac: Regular rate and rhythm.  Peripheral Vasculature: Radial pulses are equal and symmetric.  Chest Exam: Clear to auscultation bilaterally, no wheezes, no rhonchi.  Extremities: Normal, no edema.   Skin: No rashes or birthmarks.      Higher integrative function: Awake/alert, oriented to self and location.  Oriented to the year but not the month. Impaired recent memory, decreased attention/concentration.  Spontaneous speech, fund of vocabulary are normal.   CN II: Visual fields intact.  CN III IV VI: Extraocular movements are full without nystagmus. Pupils are equal, round, and reactive to light.   CN V: Normal facial sensation.  CN VII: Facial movements are symmetric, no weakness.   CN VIII: Auditory acuity is normal.   CN IX & X: Symmetric palatal movement.   CN XI: Sternocleidomastoid and trapezius are normal.   CN XII: The tongue is midline.   Motor: Normal muscle strength, bulk, and tone in upper and lower extremities except left hip flexion limited by pain/decreased range of motion. No fasciculations, rigidity, spasticity or abnormal movements.   Sensation: Intact/symmetric to light touch in arms and legs.  Station and gait: Deferred.  Coordination: Finger to nose test showed no dysmetria.   Patient reexamined, changes noted.     Scheduled Meds:aspirin, 81 mg, Oral, Daily  cyanocobalamin, 1,000 mcg, Intramuscular, Once  LORazepam, 0.25 " mg, Oral, BID  pantoprazole, 40 mg, Oral, QAM  vitamin B-12, 1,000 mcg, Oral, Daily      Continuous Infusions:   PRN Meds:.•  acetaminophen **OR** acetaminophen  •  bisacodyl  •  ondansetron  •  sodium chloride    Laboratory results:  Lab Results   Component Value Date    GLUCOSE 98 11/14/2022    CALCIUM 9.2 11/14/2022     11/14/2022    K 4.4 11/14/2022    CO2 26.8 11/14/2022     (H) 11/14/2022    BUN 16 11/14/2022    CREATININE 0.81 11/14/2022    EGFRIFAFRI 64 04/28/2017    EGFRIFNONA 45 (L) 02/10/2022    BCR 19.8 11/14/2022    ANIONGAP 6.2 11/14/2022     Lab Results   Component Value Date    WBC 6.67 11/14/2022    HGB 11.7 (L) 11/14/2022    HCT 34.1 11/14/2022    MCV 90.5 11/14/2022     11/14/2022     Lab Results   Component Value Date    CHOL 259 (H) 11/13/2022    CHOL 282 (H) 08/22/2022     Lab Results   Component Value Date    HDL 54 11/13/2022    HDL 51 09/29/2022    HDL 53 08/22/2022     Lab Results   Component Value Date     (H) 11/13/2022     (H) 09/29/2022     (H) 08/22/2022     Lab Results   Component Value Date    TRIG 106 11/13/2022    TRIG 174 (H) 09/29/2022    TRIG 169 (H) 08/22/2022         Lab 11/13/22  0750   HEMOGLOBIN A1C 5.30      Review and interpretation of imaging:  Adult transthoracic echo complete    Result Date: 11/14/2022  •  Left ventricular systolic function is normal. Left ventricular ejection fraction appears to be 51 - 55%. •  Left ventricular diastolic function is consistent with (grade I) impaired relaxation. •  Saline test results are negative. •  Estimated right ventricular systolic pressure from tricuspid regurgitation is normal (<35 mmHg).     EEG Awake or Asleep Routine    Result Date: 11/14/2022  Date of onset: 11/14/22 at 1003 Date of offset: 11/14/22 at 1049 Indication: episode of unresponsiveness Technical description:  This is a 21 channel digital EEG recording that began on 11/14/22 at 1003 and ended on 11/14/22 at 1049.  Jarvis and  seizure detection software programs were used. Background:  Up to 9 Hz alpha activity is present over the posterior head regions that symmetric, well formed and reactive to eye closure.  The patient enters the drowsy state and sleeps during the record.  Sleep activities are symmetric and contain vertex waves, sleep spindles, and K complexes.  Hyperventilation and photic stimulation were not performed.  There are no asymmetries between the two hemispheres.  No interictal activity is present. The EKG monitor shows a heart rate that varies between 80 and 85 beats per minute. Clinical Interpretation:  This routine EEG recording is normal in the awake and sleep states.  No potentially epileptogenic activity, seizure activity, or focal slowing is present.       Impression:  89-year-old right-handed female with HTN, HLD, osteoporosis, esophageal stricture, and history of breast cancer who presented 11/12/2022 with confusion and episode of loss of consciousness.  Patient's daughter states the patient lives alone and is checked on frequently.  For the last couple of months the patient has had mild confusion requiring additional help from family.  Thursday night patient was noted to have difficulty speaking and was not herself but Friday morning she was noted to be okay.  Saturday morning when her daughter went to check on her the patient was half dressed and had with the bed.  Her daughter attempted to stand the patient up to change the bed but the patient noted she felt sick and daughter laid her back down and the patient began gurgling so her daughter sat her back up at which time patient's head went back, her eyes rolled back, and the patient passed out without associated seizure activity.  The patient came to within 1 minute.  BP was 165/92 on arrival.  Patient is not currently on any blood pressure medications following an admission which time she was seen by our service and August 2022 for dizziness felt secondary to  hypotension.  Since that time her blood pressure has been running 140s to 150s, closely monitored by her daughter, without recurrent episodes of dizziness.  She has refused to take statins previously.  She is on Ativan 0.25 mg twice daily.  About a year ago her primary care doctor tried to wean her off of this but it was not tolerated by the patient.     Work-up:  MRI brain without contrast 11/13: Chronic diffuse atrophy and chronic small vessel disease but no acute findings.  EEG: normal in awake/drowsy states, no potentially epileptic activity.  CT head/neck: NoNew stenosis or LVO.  Mild left vertebral artery stenosis, favored to represent partially calcified plaque but short dissection flap is also possible radiology read.  Labs:TSH 2.6, , HDL 54, triglycerides 106, total cholesterol 259, hemoglobin A1c 5.30%, urinalysis showed no nitrites, no leukoesterase, positive for 1+ ketones and trace protein.  Orthostatics: Lying 156/75 HR 77. Standing 112/111 HR76, standing x2 min 145/86 97       Diagnosis:  Episode of loss of consciousness, ? r/t orthostatic hypotension  AMS- ?etiology, improving    Plan:  D/C to SNF noted.   No further neurologic work up needed.   D/W Dr Jennings today. Will sign off but please call if further questions/concerns.

## 2022-11-15 NOTE — CASE MANAGEMENT/SOCIAL WORK
Continued Stay Note  [facility]     Patient Name: Charo Thomas  MRN: [id number]  Today's Date: 11/15/2022    Admit Date: 11/12/2022    Plan: [facility]   Discharge Plan     Row Name 11/15/22 1315       Plan    Plan [facility]    Patient/Family in Agreement with Plan yes    Plan Comments DC orders in EPIC.  Spoke with Christa/[facility] has skilled bed available.  Spoke with daughter Hanna - she can transport patient later this evening.  Packet to [facility].  Siomara BERG                   Expected Discharge Date and Time     Expected Discharge Date Expected Discharge Time    Nov 15, 2022             Becky S. [place], RN

## 2022-11-15 NOTE — PLAN OF CARE
Goal Outcome Evaluation:      Patient alert to self, location and time. She is very talkative. VSS. No complaints of pain. SCDs worn. Scheduled Ativan given, she has slept well.

## 2022-11-16 NOTE — CASE MANAGEMENT/SOCIAL WORK
Case Management Discharge Note      Final Note: DC'd to skilled bed at [facility] with daughter transporting 11/15    Provided Post Acute Provider List?: Yes  Post Acute Provider List: Home Health, Nursing Home  Delivered To: Support Person  Support Person: Left at bedside for daughter Monet French    Selected Continued Care - Discharged on 11/15/2022 Admission date: 11/12/2022 - Discharge disposition: Skilled Nursing Facility (DC - External)    Destination Coordination complete.    Service Provider Selected Services Address Phone Fax Patient Preferred    [facility] [address] , [facility] [address] [phone] --                   Transportation Services  Private: Car    Final Discharge Disposition Code: 03 - skilled nursing facility (SNF)

## 2022-11-22 ENCOUNTER — READMISSION MANAGEMENT (OUTPATIENT)
Dept: CALL CENTER | Facility: HOSPITAL | Age: 87
End: 2022-11-22

## 2022-11-22 NOTE — OUTREACH NOTE
Prep Survey    Flowsheet Row Responses   [facility] facility patient discharged from? Non-BH   Is LACE score < 7 ? Non-BH Discharge   Emergency Room discharge w/ pulse ox? No   Eligibility [facility]    Date of Discharge 11/22/22   Discharge Disposition Home or Self Care   Discharge diagnosis Syncope and collapse   Does the patient have one of the following disease processes/diagnoses(primary or secondary)? Other   Prep survey completed? Yes          FAYE Alcala Registered Nurse

## 2022-11-23 ENCOUNTER — TRANSITIONAL CARE MANAGEMENT TELEPHONE ENCOUNTER (OUTPATIENT)
Dept: CALL CENTER | Facility: HOSPITAL | Age: 87
End: 2022-11-23

## 2022-11-23 NOTE — OUTREACH NOTE
Call Center TCM Note    Flowsheet Row Responses   [facility] patient discharged from? Non-   Does the patient have one of the following disease processes/diagnoses(primary or secondary)? Other   TCM attempt successful? Yes   Call start time 0808   Call end time 0814   Discharge diagnosis Syncope and collapse   Person spoke with today (if not patient) and relationship Daughter Cari Healy   Is the patient taking all medications as directed (includes completed medication regime)? Yes   Comments Could not pull up schedule for Dr. Ortega.  Will message office for an appt.  Daughter would like it virtual but if he feels she needs an inperson visit, she will do that.    Does the patient have an appointment with their PCP within 7 days of discharge? No appointments available   Psychosocial issues? No   What is the patient's perception of their health status since discharge? Improving   Is the patient/caregiver able to teach back signs and symptoms related to disease process for when to call PCP? Yes   Is the patient/caregiver able to teach back signs and symptoms related to disease process for when to call 911? Yes   Is the patient/caregiver able to teach back the hierarchy of who to call/visit for symptoms/problems? PCP, Specialist, Home health nurse, Urgent Care, ED, 911 Yes   If the patient is a current smoker, are they able to teach back resources for cessation? Not a smoker   Additional teach back comments Daughter states she took her mom out of the rehab center because she was not happy with the care she was getting.  States she was doing well yesterday.  They are discussing getting a paid caregiver and family members to help with her.  She may need possible home health and she will contact PCP office if this is what they feel is best for her.     TCM call completed? Yes   Call end time 0814          Bethanie Fall LPN    11/23/2022, 08:18 EST

## 2022-12-07 ENCOUNTER — OFFICE VISIT (OUTPATIENT)
Dept: FAMILY MEDICINE CLINIC | Facility: CLINIC | Age: 87
End: 2022-12-07

## 2022-12-07 VITALS
BODY MASS INDEX: 22.19 KG/M2 | OXYGEN SATURATION: 98 % | HEART RATE: 78 BPM | SYSTOLIC BLOOD PRESSURE: 175 MMHG | TEMPERATURE: 97.3 F | DIASTOLIC BLOOD PRESSURE: 82 MMHG | WEIGHT: 120.6 LBS | HEIGHT: 62 IN

## 2022-12-07 DIAGNOSIS — Z09 HOSPITAL DISCHARGE FOLLOW-UP: Primary | ICD-10-CM

## 2022-12-07 DIAGNOSIS — F41.9 ANXIETY: ICD-10-CM

## 2022-12-07 DIAGNOSIS — F13.20 BENZODIAZEPINE DEPENDENCE: ICD-10-CM

## 2022-12-07 DIAGNOSIS — I10 ESSENTIAL HYPERTENSION: Chronic | ICD-10-CM

## 2022-12-07 PROCEDURE — 1111F DSCHRG MED/CURRENT MED MERGE: CPT | Performed by: FAMILY MEDICINE

## 2022-12-07 PROCEDURE — 99495 TRANSJ CARE MGMT MOD F2F 14D: CPT | Performed by: FAMILY MEDICINE

## 2022-12-07 RX ORDER — LORAZEPAM 0.5 MG/1
0.5 TABLET ORAL EVERY 8 HOURS PRN
COMMUNITY
End: 2022-12-07 | Stop reason: SDUPTHER

## 2022-12-07 RX ORDER — AMLODIPINE BESYLATE 2.5 MG/1
2.5 TABLET ORAL DAILY
Qty: 90 TABLET | Refills: 1 | Status: SHIPPED | OUTPATIENT
Start: 2022-12-07

## 2022-12-07 RX ORDER — LORAZEPAM 0.5 MG/1
0.25 TABLET ORAL 2 TIMES DAILY
Qty: 60 TABLET | Refills: 2 | Status: SHIPPED | OUTPATIENT
Start: 2022-12-07

## 2022-12-07 NOTE — PROGRESS NOTES
Transitional Care Follow Up Visit  Subjective     Charo Thomas is a 89 y.o. female who presents for a transitional care management visit.    Within 48 business hours after discharge our office contacted her via telephone to coordinate her care and needs.      I reviewed and discussed the details of that call along with the discharge summary, hospital problems, inpatient lab results, inpatient diagnostic studies, and consultation reports with Charo.     Current outpatient and discharge medications have been reconciled for the patient.  Reviewed by: Gal Ortega MD      Date of TCM Phone Call 11/22/2022   [facility]    Date of Admission -   Date of Discharge 11/22/2022   Discharge Disposition Home or Self Care     Risk for Readmission (LACE) No data recorded    History of Present Illness   Course During Hospital Stay: Patient was admitted to the hospital and November 12, less than a month ago, for 3 night stay.  With diagnosis of altered mental status and episode of near syncope.  Neurological work-up was negative with exception of a MRI which showed generalized atrophy.  She was found to have some orthostasis issues.  Neurology saw her, concerned about dementia process.  No delirium noted.  She was then discharged to rehab hospital.  The patient's daughter discharged her from the rehab hospital early because the daughter was not happy with care being given reportedly.  She is here for follow-up.    Since getting home she has not had any falls or near falls.  No syncopal or presyncopal episodes.  She is able to feed herself.  But she needs help with meals.  She needs help with a shower.  She does have a home health aide coming a couple times a week and her cousin helps daily.  Her cousin puts the medicine out for her.  She does have some trouble remembering things.  She feels a little sad at times.  But she states she is not depressed.  She states she worries a lot.  She has long-term  benzodiazepine dependence, lorazepam 0.25 mg twice a day for many years.  Previous attempts at weaning have not work.  Previous attempts to use SSRIs have not worked.     The following portions of the patient's history were reviewed and updated as appropriate: allergies, current medications, past family history, past medical history, past social history, past surgical history and problem list.    Review of Systems    Objective   Physical Exam  Vitals and nursing note reviewed.   Constitutional:       General: She is not in acute distress.     Appearance: She is well-developed.   Cardiovascular:      Rate and Rhythm: Normal rate and regular rhythm.      Heart sounds: Normal heart sounds.   Pulmonary:      Effort: Pulmonary effort is normal.      Breath sounds: Normal breath sounds.   Musculoskeletal:      Cervical back: Normal range of motion.   Skin:     General: Skin is warm and dry.   Neurological:      Comments: [facility] status exam score 16.  Failed clock drawing.   Psychiatric:         Mood and Affect: Mood normal.         Assessment & Plan   Diagnoses and all orders for this visit:    1. Hospital discharge follow-up (Primary)    2. Essential hypertension    3. Anxiety  -     LORazepam (ATIVAN) 0.5 MG tablet; Take 0.5 tablets by mouth 2 (Two) Times a Day.  Dispense: 60 tablet; Refill: 2    4. Benzodiazepine dependence (HCC)    Other orders  -     amLODIPine (NORVASC) 2.5 MG tablet; Take 1 tablet by mouth Daily.  Dispense: 90 tablet; Refill: 1      Hospital discharge follow-up for near syncopal episode.  No recurrence.  Probable cognitive impairment with memory loss, versus early dementia.  At this time holding off further work-up.  I reviewed the hospital labs and imaging, has been thoroughly worked up at this time.  Holding off on donepezil or other medication.  With regards to her chronic benzodiazepine dependence, I am continuing the Ativan as previous.  With regards to her hypertension,  her blood pressures been elevated at home.  Averaging in the 150s systolic.  On the trampoline hydrochlorothiazide she was having hypotensive symptoms.  I am recommending low-dose amlodipine 2.5 mg a day which I sent to her pharmacy.  I want them to be checking blood pressure on a regular basis, including standing up, over the next 2 to 3 weeks then send me numbers via the [employer] portal.  I want to see her back for a 6-week telehealth visit for recheck.           Answers for HPI/ROS submitted by the patient on 12/5/2022  Please describe your symptoms.: Followup after hospital stay  Have you had these symptoms before?: No  How long have you been having these symptoms?: Greater than 2 weeks  What is the primary reason for your visit?: Other

## 2023-02-14 ENCOUNTER — TELEMEDICINE (OUTPATIENT)
Dept: FAMILY MEDICINE CLINIC | Facility: CLINIC | Age: 88
End: 2023-02-14
Payer: MEDICARE

## 2023-02-14 DIAGNOSIS — I10 ESSENTIAL HYPERTENSION: Primary | ICD-10-CM

## 2023-02-14 PROCEDURE — 99213 OFFICE O/P EST LOW 20 MIN: CPT | Performed by: FAMILY MEDICINE

## 2023-02-14 RX ORDER — IBUPROFEN 200 MG
200 TABLET ORAL
COMMUNITY

## 2023-02-14 NOTE — PROGRESS NOTES
"Chief Complaint  Hypertension    Subjective        Charo Thomas presents to [facility]  History of Present Illness     Coronavirus pandemic telehealth visit.  Good audio and video connection.  Patient gave informed consent through the [facility] check in process.    Hypertension follow-up.  She had an episode of syncope last year and was hospitalized.  However at last visit her blood pressure was still too high.  We layered and a very low-dose of amlodipine 2.5 mg a day.  She is tolerating it well.  Her blood pressure has been mostly normal.  Averaging in the 130s.  Most recently 132/74, 132/71.  But this morning slightly high at 142/85.  Her heart rate runs in the low 80s.  Her weight is down slightly at 117 pounds.  Previously 120.  No major changes in cognitive abilities.  And no cardiovascular or other symptoms.  Overall doing well.      Objective   Vital Signs:  There were no vitals taken for this visit.  Estimated body mass index is 21.91 kg/m² as calculated from the following:    Height as of 12/7/22: 158 cm (62.21\").    Weight as of 12/7/22: 54.7 kg (120 lb 9.6 oz).       BMI is within normal parameters. No other follow-up for BMI required.      Physical Exam  Constitutional:       Comments: Patient is well-known to me and appears in no acute distress.   Neurological:      Mental Status: She is oriented to person, place, and time.        Result Review :                   Assessment and Plan   Diagnoses and all orders for this visit:    1. Essential hypertension (Primary)      Hypertension follow-up.  Continue low-dose amlodipine 2.5 mg daily.  No adverse effects.  Blood pressure is reasonably controlled.  I will see her back in 4 months for recheck.  Sooner if needed.    Benzodiazepine dependence.  Longstanding low-dose lorazepam.  Attempts at weaning have not been successful.     I spent 15 minutes caring for Charo on this date of service. This time includes time spent by me " in the following activities:preparing for the visit, reviewing tests, obtaining and/or reviewing a separately obtained history, performing a medically appropriate examination and/or evaluation , counseling and educating the patient/family/caregiver and documenting information in the medical record  Follow Up   No follow-ups on file.  Patient was given instructions and counseling regarding her condition or for health maintenance advice. Please see specific information pulled into the AVS if appropriate.

## 2023-02-28 RX ORDER — LANSOPRAZOLE 30 MG/1
CAPSULE, DELAYED RELEASE ORAL
Qty: 90 CAPSULE | Refills: 1 | Status: SHIPPED | OUTPATIENT
Start: 2023-02-28

## 2023-02-28 NOTE — TELEPHONE ENCOUNTER
Rx Refill Note  Requested Prescriptions     Pending Prescriptions Disp Refills   • lansoprazole (PREVACID) 30 MG capsule [Pharmacy Med Name: Lansoprazole 30 MG Oral Capsule Delayed Release] 90 capsule 1     Sig: Take 1 capsule by mouth once daily      Last office visit with prescribing clinician: 12/7/2022   Last telemedicine visit with prescribing clinician: 6/14/2023   Next office visit with prescribing clinician: 6/14/2023                         Would you like a call back once the refill request has been completed: [] Yes [] No    If the office needs to give you a call back, can they leave a voicemail: [] Yes [] No    Murali Sparrow  02/28/23, 10:16 EST

## 2023-03-17 ENCOUNTER — APPOINTMENT (OUTPATIENT)
Dept: CT IMAGING | Facility: HOSPITAL | Age: 88
End: 2023-03-17
Payer: MEDICARE

## 2023-03-17 ENCOUNTER — HOSPITAL ENCOUNTER (EMERGENCY)
Facility: HOSPITAL | Age: 88
Discharge: HOME | End: 2023-03-17
Attending: EMERGENCY MEDICINE | Admitting: EMERGENCY MEDICINE
Payer: MEDICARE

## 2023-03-17 ENCOUNTER — APPOINTMENT (OUTPATIENT)
Dept: GENERAL RADIOLOGY | Facility: HOSPITAL | Age: 88
End: 2023-03-17
Payer: MEDICARE

## 2023-03-17 VITALS
WEIGHT: 120 LBS | HEIGHT: 62 IN | BODY MASS INDEX: 22.08 KG/M2 | HEART RATE: 69 BPM | SYSTOLIC BLOOD PRESSURE: 161 MMHG | TEMPERATURE: 98 F | RESPIRATION RATE: 16 BRPM | DIASTOLIC BLOOD PRESSURE: 82 MMHG | OXYGEN SATURATION: 98 %

## 2023-03-17 DIAGNOSIS — R40.4 TRANSIENT ALTERATION OF AWARENESS: Primary | ICD-10-CM

## 2023-03-17 LAB
ALBUMIN SERPL-MCNC: 4 G/DL (ref 3.5–5.2)
ALBUMIN/GLOB SERPL: 1.5 G/DL
ALP SERPL-CCNC: 122 U/L (ref 39–117)
ALT SERPL W P-5'-P-CCNC: 10 U/L (ref 1–33)
ANION GAP SERPL CALCULATED.3IONS-SCNC: 11 MMOL/L (ref 5–15)
AST SERPL-CCNC: 17 U/L (ref 1–32)
BACTERIA UR QL AUTO: ABNORMAL /HPF
BASOPHILS # BLD AUTO: 0.03 10*3/MM3 (ref 0–0.2)
BASOPHILS NFR BLD AUTO: 0.4 % (ref 0–1.5)
BILIRUB SERPL-MCNC: 0.7 MG/DL (ref 0–1.2)
BILIRUB UR QL STRIP: NEGATIVE
BUN SERPL-MCNC: 16 MG/DL (ref 8–23)
BUN/CREAT SERPL: 15.5 (ref 7–25)
CALCIUM SPEC-SCNC: 9.6 MG/DL (ref 8.2–9.6)
CHLORIDE SERPL-SCNC: 104 MMOL/L (ref 98–107)
CK SERPL-CCNC: 37 U/L (ref 20–180)
CLARITY UR: ABNORMAL
CO2 SERPL-SCNC: 28 MMOL/L (ref 22–29)
COLOR UR: YELLOW
CREAT SERPL-MCNC: 1.03 MG/DL (ref 0.57–1)
DEPRECATED RDW RBC AUTO: 43.2 FL (ref 37–54)
EGFRCR SERPLBLD CKD-EPI 2021: 50.8 ML/MIN/1.73
EOSINOPHIL # BLD AUTO: 0.09 10*3/MM3 (ref 0–0.4)
EOSINOPHIL NFR BLD AUTO: 1.3 % (ref 0.3–6.2)
ERYTHROCYTE [DISTWIDTH] IN BLOOD BY AUTOMATED COUNT: 13 % (ref 12.3–15.4)
GLOBULIN UR ELPH-MCNC: 2.7 GM/DL
GLUCOSE BLDC GLUCOMTR-MCNC: 208 MG/DL (ref 70–130)
GLUCOSE SERPL-MCNC: 151 MG/DL (ref 65–99)
GLUCOSE UR STRIP-MCNC: NEGATIVE MG/DL
HCT VFR BLD AUTO: 40.6 % (ref 34–46.6)
HGB BLD-MCNC: 13.1 G/DL (ref 12–15.9)
HGB UR QL STRIP.AUTO: ABNORMAL
HYALINE CASTS UR QL AUTO: ABNORMAL /LPF
IMM GRANULOCYTES # BLD AUTO: 0.01 10*3/MM3 (ref 0–0.05)
IMM GRANULOCYTES NFR BLD AUTO: 0.1 % (ref 0–0.5)
KETONES UR QL STRIP: ABNORMAL
LEUKOCYTE ESTERASE UR QL STRIP.AUTO: NEGATIVE
LIPASE SERPL-CCNC: 42 U/L (ref 13–60)
LYMPHOCYTES # BLD AUTO: 1.67 10*3/MM3 (ref 0.7–3.1)
LYMPHOCYTES NFR BLD AUTO: 23.3 % (ref 19.6–45.3)
MCH RBC QN AUTO: 29.5 PG (ref 26.6–33)
MCHC RBC AUTO-ENTMCNC: 32.3 G/DL (ref 31.5–35.7)
MCV RBC AUTO: 91.4 FL (ref 79–97)
MONOCYTES # BLD AUTO: 0.5 10*3/MM3 (ref 0.1–0.9)
MONOCYTES NFR BLD AUTO: 7 % (ref 5–12)
NEUTROPHILS NFR BLD AUTO: 4.87 10*3/MM3 (ref 1.7–7)
NEUTROPHILS NFR BLD AUTO: 67.9 % (ref 42.7–76)
NITRITE UR QL STRIP: NEGATIVE
NRBC BLD AUTO-RTO: 0 /100 WBC (ref 0–0.2)
PH UR STRIP.AUTO: 7 [PH] (ref 5–8)
PLATELET # BLD AUTO: 262 10*3/MM3 (ref 140–450)
PMV BLD AUTO: 9.7 FL (ref 6–12)
POTASSIUM SERPL-SCNC: 3.7 MMOL/L (ref 3.5–5.2)
PROT SERPL-MCNC: 6.7 G/DL (ref 6–8.5)
PROT UR QL STRIP: ABNORMAL
QT INTERVAL: 389 MS
RBC # BLD AUTO: 4.44 10*6/MM3 (ref 3.77–5.28)
RBC # UR STRIP: ABNORMAL /HPF
REF LAB TEST METHOD: ABNORMAL
SODIUM SERPL-SCNC: 143 MMOL/L (ref 136–145)
SP GR UR STRIP: 1.02 (ref 1–1.03)
SQUAMOUS #/AREA URNS HPF: ABNORMAL /HPF
T4 FREE SERPL-MCNC: 1.13 NG/DL (ref 0.93–1.7)
TSH SERPL DL<=0.05 MIU/L-ACNC: 10.1 UIU/ML (ref 0.27–4.2)
UROBILINOGEN UR QL STRIP: ABNORMAL
WBC # UR STRIP: ABNORMAL /HPF
WBC NRBC COR # BLD: 7.17 10*3/MM3 (ref 3.4–10.8)

## 2023-03-17 PROCEDURE — 84439 ASSAY OF FREE THYROXINE: CPT | Performed by: EMERGENCY MEDICINE

## 2023-03-17 PROCEDURE — 83690 ASSAY OF LIPASE: CPT | Performed by: EMERGENCY MEDICINE

## 2023-03-17 PROCEDURE — 36415 COLL VENOUS BLD VENIPUNCTURE: CPT

## 2023-03-17 PROCEDURE — 82962 GLUCOSE BLOOD TEST: CPT

## 2023-03-17 PROCEDURE — 82550 ASSAY OF CK (CPK): CPT | Performed by: EMERGENCY MEDICINE

## 2023-03-17 PROCEDURE — 93005 ELECTROCARDIOGRAM TRACING: CPT | Performed by: EMERGENCY MEDICINE

## 2023-03-17 PROCEDURE — 99285 EMERGENCY DEPT VISIT HI MDM: CPT

## 2023-03-17 PROCEDURE — 84443 ASSAY THYROID STIM HORMONE: CPT | Performed by: EMERGENCY MEDICINE

## 2023-03-17 PROCEDURE — 80053 COMPREHEN METABOLIC PANEL: CPT | Performed by: EMERGENCY MEDICINE

## 2023-03-17 PROCEDURE — 70450 CT HEAD/BRAIN W/O DYE: CPT

## 2023-03-17 PROCEDURE — 93010 ELECTROCARDIOGRAM REPORT: CPT | Performed by: INTERNAL MEDICINE

## 2023-03-17 PROCEDURE — 85025 COMPLETE CBC W/AUTO DIFF WBC: CPT | Performed by: EMERGENCY MEDICINE

## 2023-03-17 PROCEDURE — 81001 URINALYSIS AUTO W/SCOPE: CPT | Performed by: EMERGENCY MEDICINE

## 2023-03-17 PROCEDURE — 71045 X-RAY EXAM CHEST 1 VIEW: CPT

## 2023-03-17 RX ORDER — SODIUM CHLORIDE 0.9 % (FLUSH) 0.9 %
10 SYRINGE (ML) INJECTION AS NEEDED
Status: DISCONTINUED | OUTPATIENT
Start: 2023-03-17 | End: 2023-03-17 | Stop reason: HOSPADM

## 2023-03-17 NOTE — ED PROVIDER NOTES
EMERGENCY DEPARTMENT ENCOUNTER    Room Number:  15/15  Date seen:  3/17/2023  PCP: Gal Ortega MD      HPI:  Chief Complaint: Altered mental status  A complete HPI/ROS/PMH/PSH/SH/FH are unobtainable due to: Dementia  Context: Charo Thomas is a 89 y.o. female who presents to the ED c/o altered mental status.  Patient's daughter provides additional history at bedside.  She states that around 10 AM today the patient was confused with some drooling to her face.  This lasted briefly by the time EMS got to her she was looking better.  She has had no infectious symptoms.  Patient crying complains of pain all over her body.  Daughter states that she has no new complaints.  At this time, the daughter feels the patient looks like her baseline self.          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 pylori infection    • H/O Migraines    • Hip arthrosis    • Hyperlipidemia    • Hyperplastic polyps of stomach    • Hypertension    • Knee swelling 1995   • Osteoporosis    • Psychiatric disorder           PAST SURGICAL HISTORY  Past Surgical History:   Procedure Laterality Date   • APPENDECTOMY  1944   • BREAST BIOPSY Right 2010   • BREAST LUMPECTOMY     • BREAST LUMPECTOMY WITH SENTINEL NODE BIOPSY Right 2013   • COLONOSCOPY      Diverticular disease   • DILATATION AND CURETTAGE  1957, 1971    x2         FAMILY HISTORY  Family History   Problem Relation Age of Onset   • Dementia Mother    • Cancer Mother 81        Breast removed   • Cancer Father 69        Throat; smoker   • Cancer Maternal Aunt         2 aunts at age 49 and 70; deaths unrelated to cancer   • Other Son         Brain tumor   • Hypertension Son          SOCIAL HISTORY  Social History     Socioeconomic History   • Marital status:      Spouse name: Marvin   • Number of children: 1   • Years of education: High School   Tobacco Use   • Smoking status: Never   • Smokeless tobacco: Never   Vaping Use   • Vaping Use: Never used   Substance and Sexual Activity   • Alcohol use: No   • Drug use: No   • Sexual activity: Never         ALLERGIES  Cortisone, Diphenhydramine hcl (sleep), Epinephrine, Penicillins, and Sulfa antibiotics        REVIEW OF SYSTEMS  Review of Systems     All systems reviewed and negative except for those discussed in HPI.       PHYSICAL EXAM  ED Triage Vitals [03/17/23 1139]   Temp Heart Rate Resp BP SpO2   98 °F (36.7 °C) 78 16 167/76 99 %      Temp src Heart Rate Source Patient Position BP Location FiO2 (%)   Oral Monitor Lying -- --       Physical Exam      GENERAL: no acute distress  HENT: nares patent  EYES: no scleral icterus  CV: regular rhythm, normal rate  RESPIRATORY: normal effort, clear to auscultation bilaterally  ABDOMEN: soft, nontender  MUSCULOSKELETAL: no deformity  NEURO: alert, pleasantly confused, no facial asymmetry,, EOMI, PERRL, tongue protrudes midline, moves all extremities, follows simple commands but has difficulty with two-step commands  SKIN: warm, dry    Vital signs and nursing notes  reviewed.          LAB RESULTS  Recent Results (from the past 24 hour(s))   POC Glucose Once    Collection Time: 03/17/23 11:43 AM    Specimen: Blood   Result Value Ref Range    Glucose 208 (H) 70 - 130 mg/dL   Comprehensive Metabolic Panel    Collection Time: 03/17/23 12:06 PM    Specimen: Blood   Result Value Ref Range    Glucose 151 (H) 65 - 99 mg/dL    BUN 16 8 - 23 mg/dL    Creatinine 1.03 (H) 0.57 - 1.00 mg/dL    Sodium 143 136 - 145 mmol/L    Potassium 3.7 3.5 - 5.2 mmol/L    Chloride 104 98 - 107 mmol/L    CO2 28.0 22.0 - 29.0 mmol/L    Calcium 9.6 8.2 - 9.6 mg/dL    Total Protein 6.7 6.0 - 8.5 g/dL    Albumin 4.0 3.5 - 5.2 g/dL    ALT (SGPT) 10 1 - 33 U/L    AST (SGOT) 17 1 - 32 U/L    Alkaline Phosphatase 122 (H) 39 - 117 U/L    Total Bilirubin 0.7 0.0 - 1.2 mg/dL    Globulin 2.7 gm/dL    A/G Ratio 1.5 g/dL    BUN/Creatinine Ratio 15.5 7.0 - 25.0    Anion Gap 11.0 5.0 - 15.0 mmol/L    eGFR 50.8 (L) >60.0 mL/min/1.73   Lipase    Collection Time: 03/17/23 12:06 PM    Specimen: Blood   Result Value Ref Range    Lipase 42 13 - 60 U/L   T4, Free    Collection Time: 03/17/23 12:06 PM    Specimen: Blood   Result Value Ref Range    Free T4 1.13 0.93 - 1.70 ng/dL   TSH    Collection Time: 03/17/23 12:06 PM    Specimen: Blood   Result Value Ref Range    TSH 10.100 (H) 0.270 - 4.200 uIU/mL   CK    Collection Time: 03/17/23 12:06 PM    Specimen: Blood   Result Value Ref Range    Creatine Kinase 37 20 - 180 U/L   CBC Auto Differential    Collection Time: 03/17/23 12:06 PM    Specimen: Blood   Result Value Ref Range    WBC 7.17 3.40 - 10.80 10*3/mm3    RBC 4.44 3.77 - 5.28 10*6/mm3    Hemoglobin 13.1 12.0 - 15.9 g/dL    Hematocrit 40.6 34.0 - 46.6 %    MCV 91.4 79.0 - 97.0 fL    MCH 29.5 26.6 - 33.0 pg    MCHC 32.3 31.5 - 35.7 g/dL    RDW 13.0 12.3 - 15.4 %    RDW-SD 43.2 37.0 - 54.0 fl    MPV 9.7 6.0 - 12.0 fL    Platelets 262 140 - 450 10*3/mm3    Neutrophil % 67.9 42.7 - 76.0 %    Lymphocyte % 23.3 19.6 - 45.3 %     Monocyte % 7.0 5.0 - 12.0 %    Eosinophil % 1.3 0.3 - 6.2 %    Basophil % 0.4 0.0 - 1.5 %    Immature Grans % 0.1 0.0 - 0.5 %    Neutrophils, Absolute 4.87 1.70 - 7.00 10*3/mm3    Lymphocytes, Absolute 1.67 0.70 - 3.10 10*3/mm3    Monocytes, Absolute 0.50 0.10 - 0.90 10*3/mm3    Eosinophils, Absolute 0.09 0.00 - 0.40 10*3/mm3    Basophils, Absolute 0.03 0.00 - 0.20 10*3/mm3    Immature Grans, Absolute 0.01 0.00 - 0.05 10*3/mm3    nRBC 0.0 0.0 - 0.2 /100 WBC   ECG 12 Lead Altered Mental Status    Collection Time: 03/17/23 12:16 PM   Result Value Ref Range    QT Interval 389 ms   Urinalysis With Culture If Indicated - Urine, Catheter    Collection Time: 03/17/23  1:53 PM    Specimen: Urine, Catheter   Result Value Ref Range    Color, UA Yellow Yellow, Straw    Appearance, UA Hazy (A) Clear    pH, UA 7.0 5.0 - 8.0    Specific Gravity, UA 1.025 1.005 - 1.030    Glucose, UA Negative Negative    Ketones, UA Trace (A) Negative    Bilirubin, UA Negative Negative    Blood, UA Moderate (2+) (A) Negative    Protein, UA 30 mg/dL (1+) (A) Negative    Leuk Esterase, UA Negative Negative    Nitrite, UA Negative Negative    Urobilinogen, UA 1.0 E.U./dL 0.2 - 1.0 E.U./dL   Urinalysis, Microscopic Only - Urine, Catheter    Collection Time: 03/17/23  1:53 PM    Specimen: Urine, Catheter   Result Value Ref Range    RBC, UA 0-2 None Seen, 0-2 /HPF    WBC, UA None Seen None Seen, 0-2 /HPF    Bacteria, UA 2+ (A) None Seen /HPF    Squamous Epithelial Cells, UA 3-6 (A) None Seen, 0-2 /HPF    Hyaline Casts, UA None Seen None Seen /LPF    Methodology Manual Light Microscopy        Ordered the above labs and reviewed the results.        RADIOLOGY  CT Head Without Contrast    Result Date: 3/17/2023  CT HEAD WITHOUT CONTRAST  CLINICAL HISTORY: Altered mental status.  TECHNIQUE: CT scan of the head was obtained with 3 mm axial soft tissue algorithm images. No intravenous contrast was administered. Sagittal and coronal reconstructions were  obtained.  COMPARISON: Brain MRI dated 11/13/2022.  FINDINGS:   The ventricles, sulci, and cisterns are generally age appropriate. There are mild-to-moderate changes of chronic small vessel ischemic phenomena. The gray-white matter differentiation is within normal limits. The posterior fossa structures are unremarkable. There are prominent extra-axial spaces versus small subdural hygromas noted lateral to the cerebral hemispheres. These findings are unchanged since prior exam.  Incidental note is made of a mucous retention cyst within the right maxillary sinus.       There is no CT evidence to suggest acute intracranial pathology. Incidental chronic findings are as discussed above. The etiology of the patient's altered mental status is not further elucidated on this examination; and if further assessment is required, one could obtain an MRI of the brain for follow-up.  Radiation dose reduction techniques were utilized, including automated exposure control and exposure modulation based on body size.  This report was finalized on 3/17/2023 2:32 PM by Dr. Varun Iglesias M.D.      XR Chest 1 View    Result Date: 3/17/2023  XR CHEST 1 VW-  HISTORY: Female who is 89 years-old,  altered mental status  TECHNIQUE: Frontal view of the chest  COMPARISON: 11/12/2022  FINDINGS: Heart, mediastinum and pulmonary vasculature are unremarkable. Mild chronic interstitial prominence. Minimal atelectasis or infiltrate left base. No pleural effusion or pneumothorax. No acute osseous process.      Minimal atelectasis or infiltrate left base.  This report was finalized on 3/17/2023 12:23 PM by Dr. Jhonathan Green M.D.        Ordered the above noted radiological studies. Reviewed by me in PACS.          PROCEDURES  Procedures          MEDICATIONS GIVEN IN ER  Medications   sodium chloride 0.9 % flush 10 mL (has no administration in time range)         MEDICAL DECISION MAKING, PROGRESS, and CONSULTS    Discussion below represents my  analysis of pertinent findings related to patient's condition, differential diagnosis, treatment plan and final disposition.      Orders placed during this visit:  Orders Placed This Encounter   Procedures   • XR Chest 1 View   • CT Head Without Contrast   • Comprehensive Metabolic Panel   • Lipase   • Urinalysis With Culture If Indicated - Urine, Catheter   • T4, Free   • TSH   • CK   • CBC Auto Differential   • Urinalysis, Microscopic Only - Urine, Clean Catch   • Monitor Blood Pressure   • Cardiac Monitoring   • Pulse Oximetry, Continuous   • POC Glucose Once   • ECG 12 Lead Altered Mental Status   • Insert Peripheral IV   • CBC & Differential         Additional sources:  - Discussed/obtained information from independent historians: Daughter at bedside  Additional information was obtained to confirm the patient's history.          Differential diagnosis:    Differential diagnosis for altered mental status includes:  - vital sign abnormalities such as HTN encephalopathy, hypotension, hypoxemia, hypercarbia, heat stroke  - toxic/metabolic pathology such as hypoglycemia, DKA, hypo/hyper-natremia, thyroid storm, myxedema coma, medication side effect (either intentional or accidental)  - infectious etiology  - intracranial pathology such as stroke, seizure, intracranial mass, intracranial hemorrhage  - psychiatric pathology    After initial evaluation, I am suspicious that patient may require admission due to her episode of altered ental status which could be related to intracranial mass or hemorrhage.  Therefore, I ordered a CT scan to evaluate for this possibility.        Independent interpretation of labs, radiology studies, and discussions with consultants:  ED Course as of 03/17/23 1442   Fri Mar 17, 2023   1247 EKG independently interpreted by myself.  Time 12:16 PM.  My findings are atrial fibrillation.  Heart rate 78.  Normal intervals and axis.  Prolonged R wave progression.  No acute ST abnormality. [TD]    1248 WBC: 7.17 [TD]   1248 Creatinine(!): 1.03 [TD]   1248 Sodium: 143 [TD]   1248 Potassium: 3.7 [TD]   1248 Hemoglobin: 13.1 [TD]   1429 I discussed the CT head results with Dr. Iglesias, radiology.  Imaging is acutely negative. [TD]   1429 WBC, UA: None Seen [TD]      ED Course User Index  [TD] Kashif Chavez II, MD         Thankfully, this point, the patient has continued remain at her neurological baseline throughout her ED course.  Her work-up is unrevealing.  I discussed this with the patient's daughter in detail.  There is no compelling reason to admit the patient at this time.  In fact, given her age, daughter agrees that we would not pursue any kind of aggressive therapy for her.  Therefore, I think it is in the patient's best interest to be discharged home where she can avoid the risk of nosocomial infection and we can monitor symptoms.      PPE: The patient wore a mask throughout the entire encounter. I wore a well-fitting mask.    DIAGNOSIS  Final diagnoses:   Transient alteration of awareness         DISPOSITION  DISCHARGE    FOLLOW-UP  Gal Ortega MD  [address]  [place] [address]  [phone]    Schedule an appointment as soon as possible for a visit in 3 days      [facility]  [address] [address] [phone]  Go to   If symptoms worsen         Medication List      No changes were made to your prescriptions during this visit.             Latest Documented Vital Signs:  As of 14:42 EDT  BP- 150/81 HR- 70 Temp- 98 °F (36.7 °C) (Oral) O2 sat- 98%      --    Please note that portions of this were completed with a voice recognition program.       Note Disclaimer: At [facility], we believe that sharing information builds trust and better relationships. You are receiving this note because you are receiving care at [facility] or recently visited. It is possible you will see health information before a  provider has talked with you about it. This kind of information can be easy to misunderstand. To help you fully understand what it means for your health, we urge you to discuss this note with your provider.       Kashif Chavez II, MD  03/17/23 8946

## 2023-03-21 ENCOUNTER — PATIENT OUTREACH (OUTPATIENT)
Dept: CASE MANAGEMENT | Facility: OTHER | Age: 88
End: 2023-03-21
Payer: MEDICARE

## 2023-03-21 NOTE — OUTREACH NOTE
Patient Outreach    AMBULATORY CASE MANAGEMENT NOTE    Name and Relationship of Patient/Support Person: Charo Thomas W - Self    Call placed to patient to follow up on recent ED visit. She states she is doing well and denies any issues. Reminded that ED recommended a follow up with Dr Ortega this week. She will call for an appointment.         Corien GONSALVES  Ambulatory Case Management    3/21/2023, 14:41 EDT

## 2023-04-26 NOTE — PROGRESS NOTES
"Subjective   Charo Thomas is a 87 y.o. female.     Chief Complaint   Patient presents with   • Fatigue     poor appetite also        History of Present Illness    2 weeks ago developed URI symptoms.  No sore throat.  Some sinus symptoms.  Some cough.  That is better.  Had some loose stool diarrhea for couple days recently, that is better also.  No UTI symptoms.  Had a low-grade fever 100.1 about 10 days ago.  That got better.  Overall she feels like she is \"slowing down\".  Over the last few weeks or few months she's had decreased activity level.  Norco or phobia.  No major depression.  She has a history of long-standing generalized anxiety which is unchanged.  She continues one half of her lorazepam twice a day.  She states when she walks she feels a little \"wobbly\".  Some generalized weakness.  However no focal neurological deficits.  No major changes in sleep.  No major memory issues.  She does have support from family and friends.  Some decreased interest in pleasurable activities.  Mostly things don't look good or taste good and she's been losing a little bit of weight.  With the cough she had some gagging while eating.  History of breast cancer.  In reported remission.  Seen by oncology about 3 months ago.  History of B12 deficiency.  Last check the number was high.  She's taking 2 different B12 supplements.      The following portions of the patient's history were reviewed and updated as appropriate: allergies, current medications, past family history, past medical history, past social history, past surgical history and problem list.          Review of Systems   Constitutional: Positive for activity change, fatigue, fever and unexpected weight change.   HENT: Negative.    Respiratory: Negative for cough.    Cardiovascular: Negative.    Gastrointestinal: Negative.    Genitourinary: Negative.    Musculoskeletal: Negative.    Neurological: Positive for weakness. Negative for dizziness, light-headedness and " [facility]  Cardiology Progress Note    Lenin Kaur, 67 year old male  : 1955  PCP: Pcp, No  Attending/Consulting Provider: Patrice Anand MD       SUBJECTIVE:     Seen and examined patient this morning.     Extubated, BP elevated at 180/55    Patient denies any acute complaints      Review of Systems   14 point ROS negative except for as above.    Medications:  Prior to Admission medications    Medication Sig Start Date End Date Taking? Authorizing Provider   aspirin (ECOTRIN) 81 MG EC tablet Take 81 mg by mouth daily.   Yes Provider, Outside   atorvastatin (LIPITOR) 40 MG tablet Take 40 mg by mouth daily.   Yes Provider, Outside   carvedilol (COREG) 25 MG tablet Take 25 mg by mouth in the morning and 25 mg in the evening. Take with meals.   Yes Provider, Outside   ferrous sulfate 325 (65 FE) MG EC tablet Take 325 mg by mouth in the morning and 325 mg in the evening.   Yes Provider, Outside   furosemide (LASIX) 80 MG tablet Take 40 mg by mouth 2 times daily.   Yes Provider, Outside   insulin glargine (LANTUS) 100 UNIT/ML vial solution Inject 12 Units into the skin daily.   Yes Provider, Outside   insulin lispro 100 UNIT/ML injectable solution Inject 10 Units into the skin in the morning and 10 Units at noon and 10 Units in the evening. Inject before meals.   Yes Provider, Outside   isosorbide mononitrate (IMDUR) 30 MG 24 hr tablet Take 30 mg by mouth daily.   Yes Provider, Outside   mycophenolate (CELLCEPT) 250 MG capsule Take 500 mg by mouth in the morning and 500 mg in the evening.   Yes Provider, Outside   NIFEdipine CC (ADALAT CC) 60 MG 24 hr tablet Take 60 mg by mouth in the morning and 60 mg in the evening.   Yes Provider, Outside   sulfamethoxazole-trimethoprim (BACTRIM SS) 400-80 MG per tablet Take 1 tablet by mouth 3 days a week. Take on Monday, Wednesday & Friday   Yes Provider, Outside   TACROlimus (PROGRAF) 1 MG capsule Take 3 mg by mouth daily. 3 mg in am, 2 mg in pm   Yes Provider,  "headaches.   Psychiatric/Behavioral: Negative for confusion, dysphoric mood and sleep disturbance. The patient is nervous/anxious.        Objective   Blood pressure 118/70, pulse 80, temperature 97.9 °F (36.6 °C), temperature source Oral, resp. rate 16, height 63.5\" (161.3 cm), weight 144 lb (65.3 kg), SpO2 96 %.  Physical Exam   Constitutional: She appears well-developed and well-nourished. No distress.   HENT:   Head: Atraumatic.   Eyes: Conjunctivae are normal.   Neck: Normal range of motion. No thyromegaly present.   Cardiovascular: Normal rate, regular rhythm, normal heart sounds and intact distal pulses.    Pulmonary/Chest: Effort normal and breath sounds normal.   Abdominal: Soft. She exhibits no distension. There is no tenderness.   Musculoskeletal: She exhibits no edema.   Skin: Skin is warm and dry.   Psychiatric: She has a normal mood and affect. Her behavior is normal. Judgment and thought content normal.   Nursing note and vitals reviewed.      Assessment/Plan   Charo was seen today for fatigue.    Diagnoses and all orders for this visit:    Other fatigue  -     CBC & Differential  -     TSH Rfx On Abnormal To Free T4  -     Urinalysis With / Microscopic If Indicated    Generalized anxiety disorder  -     TSH Rfx On Abnormal To Free T4    Vitamin B12 deficiency  -     Vitamin B12    Essential hypertension  -     Comprehensive Metabolic Panel    Fever in other diseases  -     CBC & Differential  -     Urinalysis With / Microscopic If Indicated    Recent URI syndrome with fever.  That is resolving.  She has fatigue related to this and also previous generalized weakness.  Differential diagnosis is long.  I don't believe she is suffering from major depression.  She has been on stable benzodiazepine's for many years.  I do not believe this is a side effect of the benzodiazepine's.  Lab work as above.  She also has history of B12 deficiency in the distant past, however last check the number was actually " Outside   tenofovir disoproxil fumarate (VIREAD) 300 MG tablet Take 300 mg by mouth 2 days a week. Take on Monday & Friday   Yes Provider, Outside   predniSONE (DELTASONE) 5 MG tablet Take 5 mg by mouth daily.   Yes Provider, Outside   valGANciclovir (VALCYTE) 450 MG tablet Take 450 mg by mouth 3 days a week. Take on Monday, Wednesday & Friday   Yes Provider, Outside   famotidine (PEPCID) 20 MG tablet Take 20 mg by mouth daily.   Yes Provider, Outside   gabapentin (NEURONTIN) 300 MG capsule Take 300 mg by mouth at bedtime.   Yes Provider, Outside   TACROlimus (PROGRAF) 1 MG capsule Take 2 mg by mouth in the morning and 2 mg in the evening. 3 mg in am, 2 mg in pm    Provider, Outside       Current Facility-Administered Medications   Medication Dose Route Frequency Provider Last Rate Last Admin   • pantoprazole (PROTONIX) 40 MG/20ML (compounded) suspension 40 mg  40 mg Per OG Tube Daily Vince Patel MD   40 mg at 04/26/23 0618   • midodrine (PROAMATINE) tablet 5 mg  5 mg Oral BID AC Yahir Brown MD       • polyethylene glycol (MIRALAX) packet 17 g  17 g Per OG Tube Daily Jessica Siegel MD   17 g at 04/25/23 0945   • sennosides (SENOKOT) 8.8 MG/5ML syrup 5 mL  5 mL Per OG Tube BID PRN Jessica Siegel MD       • fentaNYL (SUBLIMAZE) injection  mcg   mcg Intravenous Q15 Min PRN Vince Patel MD       • fentaNYL (SUBLIMAZE) injection  mcg   mcg Intravenous Q1H PRN Vince Patel MD   50 mcg at 04/25/23 1903   • perflutren lipid microsphere (DEFINITY) injection 2 mL  2 mL Intravenous Once Tato Mckeon MD       • heparin (porcine) 25,000 units/250 mL in dextrose 5 % infusion  1-30 Units/kg/hr (Dosing Weight) Intravenous Continuous Vince Patel MD 6.1 mL/hr at 04/26/23 0700 8 Units/kg/hr at 04/26/23 0700   • heparin (porcine) injection 4,000 Units  4,000 Units Intravenous PRN Vince Patel MD       • heparin (porcine) injection 2,000 Units  2,000 Units Intravenous  PRN Vince Patel MD   2,000 Units at 04/26/23 0638   • mycophenolate (CELLCEPT) 200 mg/mL oral suspension 500 mg  500 mg Per OG Tube BIDTX Vince Patel MD   500 mg at 04/25/23 2142   • TACROlimus (PROGRAF) 0.5 MG/ML (compounded) suspension 1 mg  1 mg Per OG Tube Nightly Xavier Barakat MD   1 mg at 04/25/23 2142   • TACROlimus (PROGRAF) 0.5 MG/ML (compounded) suspension 2 mg  2 mg Per OG Tube Daily Xavier Barakat MD   2 mg at 04/25/23 0954   • sodium chloride (NORMAL SALINE) 0.9 % bolus 100-200 mL  100-200 mL Intravenous PRN Matthew Fraser MD       • sodium chloride (PF) 0.9 % injection 10 mL  10 mL Intracatheter PRN Matthew Fraser MD       • hydroCORTisone (Solu-CORTEF) PF injection 50 mg  50 mg Intravenous 3 times per day Niko Santiago T   50 mg at 04/26/23 0647   • cefepime (MAXIPIME) 1,000 mg in sodium chloride 0.9 % 100 mL IVPB  1,000 mg Intravenous Q24H Niko Santiago   Completed at 04/25/23 1329   • doxycycline (VIBRAMYCIN) 100 mg in sodium chloride 0.9 % 100 mL IVPB  100 mg Intravenous 2 times per day Niko Santiago   Completed at 04/25/23 2248   • [START ON 4/28/2023] tenofovir disoproxil fumarate (VIREAD) tablet 300 mg  300 mg Per OG Tube Once per day on Mon Fri Sergio Viveros MD       • atorvastatin (LIPITOR) tablet 40 mg  40 mg Per OG Tube Daily Sergio Viveros MD   40 mg at 04/25/23 0945   • ferrous sulfate 300 (60 Fe) MG/5ML liquid 300 mg  300 mg Per OG Tube Every Other Day Sergio Viveros MD   300 mg at 04/25/23 0946   • gabapentin (NEURONTIN) capsule 300 mg  300 mg Per OG Tube QHS Sergio Viveros MD   300 mg at 04/25/23 2026   • acetaminophen (TYLENOL) tablet 650 mg  650 mg Oral Q4H PRN Jessica Siegel MD       • sodium chloride 0.9 % flush bag 25 mL  25 mL Intravenous PRN Sergio Viveros MD       • sodium chloride (PF) 0.9 % injection 2 mL  2 mL Intracatheter 2 times per day Sergio Viveros MD   2 mL at 04/25/23  high.  She is going to stop the 1000 µg of B12 a day, she can for the time continue the B complex over-the-counter.  Checking B12 level today.  I will see her back in 6 weeks for a recheck on weight.  She will call sooner with worsening symptoms.          2027   • sodium chloride 0.9% infusion   Intravenous Continuous PRN Sergio Viveros MD       • sodium chloride 0.9% infusion   Intravenous Continuous PRN Sergio Viveros MD       • sodium chloride (NORMAL SALINE) 0.9 % bolus 500 mL  500 mL Intravenous PRN Sergio Viveros MD       • sodium chloride 0.9 % flush bag 25 mL  25 mL Intravenous PRN Sergio Viveros MD       • dextrose 50 % injection 25 g  25 g Intravenous PRN Sergio Viveros MD   25 g at 04/24/23 1131   • dextrose 50 % injection 12.5 g  12.5 g Intravenous PRN Sergio Viveros MD       • glucagon (GLUCAGEN) injection 1 mg  1 mg Intramuscular PRN Sergio Viveros MD       • dextrose (GLUTOSE) 40 % gel 15 g  15 g Oral PRN Sergio Viveros MD       • dextrose (GLUTOSE) 40 % gel 30 g  30 g Oral PRN Sergio Viveros MD       • insulin glargine (LANTUS) injection 10 Units  10 Units Subcutaneous Nightly Sergio Viveros MD   10 Units at 04/25/23 2142   • [Held by provider] aspirin (ECOTRIN) enteric coated tablet 81 mg  81 mg Oral Daily Sergio Viveros MD   81 mg at 04/24/23 0917   • [Held by provider] carvedilol (COREG) tablet 12.5 mg  12.5 mg Oral BID WC Sergio Viveros MD   12.5 mg at 04/24/23 0918   • [Held by provider] isosorbide mononitrate (IMDUR) ER tablet 30 mg  30 mg Oral Daily Sergio Viveros MD   30 mg at 04/24/23 0917   • [Held by provider] predniSONE (DELTASONE) tablet 5 mg  5 mg Oral Daily Sergio Viveros MD   5 mg at 04/25/23 0945   • sulfamethoxazole-trimethoprim (BACTRIM SS) 400-80 MG tablet 1 tablet  1 tablet Oral Once per day on Mon Wed Fri Sergio Viveros MD   1 tablet at 04/24/23 0917   • valGANciclovir (VALCYTE) tablet 450 mg  450 mg Oral Once per day on Mon Wed Fri Sergio Viveros MD   450 mg at 04/24/23 0917   • [Held by provider] NIFEdipine XL (PROCARDIA XL) ER tablet 60 mg  60 mg Oral BID Sergio Viveros MD       • insulin lispro (ADMELOG,HumaLOG) - Correction Dose   Subcutaneous 4 times per  day Sergio Viveros MD   6 Units at 04/26/23 0618   • bumetanide (BUMEX) 12.5 mg/50 mL infusion  2 mg/hr Intravenous Continuous Gerald Benavides MD 8 mL/hr at 04/26/23 0700 2 mg/hr at 04/26/23 0700   • [Held by provider] metOLazone (ZAROXOLYN) tablet 10 mg  10 mg Oral Daily Gerald Benavides MD       • sodium chloride (NORMAL SALINE) 0.9 % bolus 100-200 mL  100-200 mL Intravenous PRN Matthew Fraser MD       • chlorhexidine gluconate (PERIDEX) 0.12 % solution 15 mL  15 mL Swish & Spit 2 times per day Tato Mckeon MD   15 mL at 04/25/23 2026    And   • chlorhexidine gluconate (PERIDEX) 0.12 % solution 15 mL  15 mL Swish & Spit PRN Tato Mckeon MD             OBJECTIVE:     Vital Last Value 24 Hour Range   Temperature 97.3 °F (36.3 °C) (04/26/23 0400) Temp  Min: 97.3 °F (36.3 °C)  Max: 97.9 °F (36.6 °C)   Pulse 67 (04/26/23 0700) Pulse  Min: 59  Max: 71   Respiratory 15 (04/26/23 0700) Resp  Min: 10  Max: 21   Non-Invasive  Blood Pressure (!) 164/82 (04/26/23 0600) BP  Min: 107/41  Max: 164/82   Pulse Oximetry 100 % (04/26/23 0700) SpO2  Min: 100 %  Max: 100 %   Arterial   Blood Pressure (!) 174/58 (04/25/23 0900) Arterial Line BP  Min: 174/58  Max: 174/58        Intake/Output Summary (Last 24 hours) at 4/26/2023 0806  Last data filed at 4/26/2023 0700  Gross per 24 hour   Intake 1554.33 ml   Output 1165 ml   Net 389.33 ml        Wt Readings from Last 3 Encounters:   04/26/23 70.1 kg (154 lb 8.7 oz)               Physical Exam     General:  NAD  ENT: Moist mucous membranes  Neck: JVD elevated above the jaw  Respiratory: Asculted anteriorly, mechanical sound  Cardiovascular: RRR, S1, S2, no S3, S4, III/VI systolic murmurs, rubs or gallops appreciated  Gastrointestinal: Soft, NT, ND, no rebound  Extrem: +2 edema, pulses 2+ in UE and LE b/l  Skin: No cyanosis        DIAGNOSTIC STUDIES:     Labs:  CBC:   Recent Labs   Lab 04/26/23  0319 04/25/23  0310 04/24/23  0251 04/23/23  0757   WBC 12.6* 9.1  6.9 6.7   RBC 3.86* 3.43* 3.62* 3.23*   HGB 8.6* 7.5* 7.9* 7.1*   HCT 27.4* 24.4* 26.9* 24.0*   MCV 71.0* 71.1* 74.3* 74.3*   MCHC 31.4* 30.7* 29.4* 29.6*   RDW-CV 19.9* 19.8* 20.3* 20.7*    137* 141 181   Lymphocytes, Percent 1 1 4 6     CMP:  Recent Labs   Lab 04/26/23  0319 04/25/23  1520 04/25/23  0310 04/23/23  1228 04/23/23  0757   SODIUM 135 137 137   < > 140   POTASSIUM 3.8 4.1 4.2   < > 4.6   CHLORIDE 97 99 100   < > 101   CO2 20* 20* 21   < > 22   * 115* 113*   < > 93*   CREATININE 6.31* 6.19* 5.98*   < > 4.88*   GLUCOSE 306* 274* 224*   < > 224*   ALBUMIN 2.6*  --   --   --  3.8   PHOS  --   --  7.2*  --   --    AST 23  --   --   --  23   GPT 37  --   --   --  26   ALKPT 59  --   --   --  83   BILIRUBIN 0.3  --   --   --  0.4   MG 2.1 2.1 2.1   < >  --     < > = values in this interval not displayed.     COAGULATION STUDIES:   Recent Labs   Lab 04/26/23  0609 04/25/23  2349 04/25/23  1800 04/24/23 2125 04/23/23 0757   PT  --   --   --   --  12.3*   PTT 42* 54* 52*   < > 28   INR  --   --   --   --  1.2    < > = values in this interval not displayed.     TSH:  No results found for: TSH  HbA1c: No results found for: HGBA1C  LIPID PANEL: No results found  NT-PRONBP:   Recent Labs   Lab 04/23/23 0757   NT-proBNP 19,679*     Troponin: No results found    Data:  Encounter Date: 04/23/23   Electrocardiogram 12-Lead   Result Value    Ventricular Rate EKG/Min (BPM) 75    Atrial Rate (BPM) 75    MI-Interval (MSEC) 168    QRS-Interval (MSEC) 96    QT-Interval (MSEC) 392    QTc 438    P Axis (Degrees) 89    R Axis (Degrees) 92    T Axis (Degrees) 99    REPORT TEXT      Normal sinus rhythm  Rightward axis  Pulmonary disease pattern  T wave abnormality, consider lateral ischemia  Abnormal ECG  When compared with ECG of  24-APR-2023 04:19,  No significant change was found  Confirmed by ANDRIA EMERY, [facility] ([id number]) on 4/24/2023 5:51:20 PM         No results found for this or any previous  visit.        IMPRESSION AND RECOMMENDATIONS:     IMPRESSION: Lenin Kaur is a 67 year old male with PMHx of end-stage renal disease status post kidney transplant and August 2013, hepatitis B and C, HFpEF, hypertension, chronic anemia, DM2 and hyperlipidemia admitted for acute hypoxic respiratory failure in the setting of MERT on CKD likely due to transplant rejection.  Cardiology consulted for concern of concomitant acute decompensated heart failure.     Diagnoses:   #NSTEMI, Troponin peaked at 92955  #Acute hypoxic respiratory failure  #MERT in the setting of acute transplant rejection   # Acute decompensated HEpEF EF 50% NYHA III/C  #HTN  #DM2  #Hep C/B        Recommendations:  -Can stop trending trop and EKG   -Continue heparin drip for total of 48 hours, continue aspirin and atorvastatin.  For now, we will medically manage his NSTEMI, once patient stabilized from renal standpoint, will consider ischemia evaluation  -TTE showed preserved EF 50%,Possible hypokinesis of the mid anteroseptal wall  -Discussed with nephrology, hold off on Bumex drip to see patient's kidney function  -Recommend to restart BP meds due to hypertension, defer to ICU team  -Immunosuppressant management by primary team/nephrology.      Discussed with Dr. Abigail Harley  Cardiology Fellow, PGY-4  Heart Failure Consult Service  4/26/2023  8:06 AM

## 2023-05-24 RX ORDER — AMLODIPINE BESYLATE 2.5 MG/1
TABLET ORAL
Qty: 90 TABLET | Refills: 1 | Status: SHIPPED | OUTPATIENT
Start: 2023-05-24

## 2023-05-24 NOTE — TELEPHONE ENCOUNTER
Rx Refill Note  Requested Prescriptions     Pending Prescriptions Disp Refills   • amLODIPine (NORVASC) 2.5 MG tablet [Pharmacy Med Name: amLODIPine Besylate 2.5 MG Oral Tablet] 90 tablet 1     Sig: Take 1 tablet by mouth once daily      Last office visit with prescribing clinician: 12/7/2022   Last telemedicine visit with prescribing clinician: 2/14/2023   Next office visit with prescribing clinician: 6/14/2023                         Would you like a call back once the refill request has been completed: [] Yes [] No    If the office needs to give you a call back, can they leave a voicemail: [] Yes [] No    Murali Sparrow  05/24/23, 09:41 EDT

## 2023-06-14 ENCOUNTER — OFFICE VISIT (OUTPATIENT)
Dept: FAMILY MEDICINE CLINIC | Facility: CLINIC | Age: 88
End: 2023-06-14
Payer: MEDICARE

## 2023-06-14 VITALS
HEART RATE: 82 BPM | OXYGEN SATURATION: 97 % | TEMPERATURE: 97.5 F | BODY MASS INDEX: 21.57 KG/M2 | WEIGHT: 117.2 LBS | HEIGHT: 62 IN | DIASTOLIC BLOOD PRESSURE: 81 MMHG | SYSTOLIC BLOOD PRESSURE: 158 MMHG

## 2023-06-14 DIAGNOSIS — I10 ESSENTIAL HYPERTENSION: Primary | ICD-10-CM

## 2023-06-14 PROCEDURE — 99213 OFFICE O/P EST LOW 20 MIN: CPT | Performed by: FAMILY MEDICINE

## 2023-06-14 NOTE — PROGRESS NOTES
"Chief Complaint  Hypertension    Subjective        Charo Thomas presents to [facility]  History of Present Illness    Hypertension follow-up.  They have not been checking the blood pressure at home recently but her recent months that was running normal.  Certainly lower than 150/81 that she has today.  She continues amlodipine 2.5 mg daily.  She is having some wandering at nighttime.  She will get up and put on clothes, and then go back to bed.  She states she has no hallucinations.  She has had trouble with memory.  Daughter has noted this also.  No evidence of delirium.  She continues on long-term lorazepam, 1/2 tablet twice a day that she is taken for 40 years.  She has had some falls.    Objective   Vital Signs:  /81   Pulse 82   Temp 97.5 °F (36.4 °C) (Temporal)   Ht 158 cm (62.21\")   Wt 53.2 kg (117 lb 3.2 oz)   SpO2 97%   BMI 21.29 kg/m²   Estimated body mass index is 21.29 kg/m² as calculated from the following:    Height as of this encounter: 158 cm (62.21\").    Weight as of this encounter: 53.2 kg (117 lb 3.2 oz).       BMI is within normal parameters. No other follow-up for BMI required.      Physical Exam  Constitutional:       Appearance: Normal appearance.   Cardiovascular:      Rate and Rhythm: Normal rate and regular rhythm.   Pulmonary:      Effort: Pulmonary effort is normal.   Neurological:      Mental Status: She is alert.   Psychiatric:         Mood and Affect: Mood normal.      Result Review :  The following data was reviewed by: Gal Ortega MD on 06/14/2023:  Common labs          11/13/2022    07:50 11/14/2022    06:56 3/17/2023    12:06   Common Labs   Glucose 98  98  151    BUN 17  16  16    Creatinine 0.86  0.81  1.03    Sodium 139  142  143    Potassium 3.1  4.4  3.7    Chloride 102  109  104    Calcium 9.1  9.2  9.6    Albumin 3.70  3.20  4.0    Total Bilirubin 1.7  1.1  0.7    Alkaline Phosphatase 86  78  122    AST (SGOT) 20  21  17  "   ALT (SGPT) 9  10  10    WBC 8.79  6.67  7.17    Hemoglobin 12.7  11.7  13.1    Hematocrit 36.6  34.1  40.6    Platelets 212  197  262    Total Cholesterol 259      Triglycerides 106      HDL Cholesterol 54      LDL Cholesterol  186      Hemoglobin A1C 5.30                     Assessment and Plan   Diagnoses and all orders for this visit:    1. Essential hypertension (Primary)      Hypertension.  Likely well controlled.  I have asked him to check blood pressure at home.    Counseling given with regards to her nighttime wandering.  There is certainly an element of mild cognitive impairment memory loss.  And possibly a developing dementia.  I did not do a formal mental status exam today.  However we will consider that in the future.  For the time being no immediate work-up needed.  There is no sudden changes in her mental health, cognitive function, or physical status.  I did give them counseling with regards to assisted living.  I will see her back in 6 months for Medicare wellness visit, sooner as needed.         Follow Up   No follow-ups on file.  Patient was given instructions and counseling regarding her condition or for health maintenance advice. Please see specific information pulled into the AVS if appropriate.

## 2023-08-15 RX ORDER — LANSOPRAZOLE 30 MG/1
30 CAPSULE, DELAYED RELEASE ORAL DAILY
Qty: 90 CAPSULE | Refills: 0 | Status: SHIPPED | OUTPATIENT
Start: 2023-08-15

## 2023-08-15 NOTE — TELEPHONE ENCOUNTER
Rx Refill Note  Requested Prescriptions     Pending Prescriptions Disp Refills    lansoprazole (PREVACID) 30 MG capsule [Pharmacy Med Name: Lansoprazole 30 MG Oral Capsule Delayed Release] 90 capsule 0     Sig: Take 1 capsule by mouth once daily      Last office visit with prescribing clinician: 6/14/2023   Last telemedicine visit with prescribing clinician: Visit date not found   Next office visit with prescribing clinician: 10/20/2023                         Would you like a call back once the refill request has been completed: [] Yes [] No    If the office needs to give you a call back, can they leave a voicemail: [] Yes [] No    Murali Sparrow  08/15/23, 10:12 EDT

## 2023-08-31 DIAGNOSIS — F41.9 ANXIETY: ICD-10-CM

## 2023-09-01 RX ORDER — LORAZEPAM 0.5 MG/1
0.25 TABLET ORAL 2 TIMES DAILY
Qty: 50 TABLET | Refills: 0 | Status: SHIPPED | OUTPATIENT
Start: 2023-09-01

## 2023-09-01 NOTE — TELEPHONE ENCOUNTER
Rx Refill Note  Requested Prescriptions     Pending Prescriptions Disp Refills    LORazepam (ATIVAN) 0.5 MG tablet [Pharmacy Med Name: LORazepam 0.5 MG Oral Tablet] 50 tablet 0     Sig: Take 1/2 (one-half) tablet by mouth twice daily      Last office visit with prescribing clinician: 6/14/2023   Last telemedicine visit with prescribing clinician: Visit date not found   Next office visit with prescribing clinician: 10/20/2023                         Would you like a call back once the refill request has been completed: [] Yes [] No    If the office needs to give you a call back, can they leave a voicemail: [] Yes [] No    Murali Sparrow  09/01/23, 07:59 EDT

## 2023-10-15 DIAGNOSIS — F41.9 ANXIETY: ICD-10-CM

## 2023-10-16 RX ORDER — LORAZEPAM 0.5 MG/1
0.25 TABLET ORAL 2 TIMES DAILY
Qty: 45 TABLET | Refills: 0 | Status: SHIPPED | OUTPATIENT
Start: 2023-10-16 | End: 2023-10-23 | Stop reason: SDUPTHER

## 2023-10-20 ENCOUNTER — OFFICE VISIT (OUTPATIENT)
Dept: FAMILY MEDICINE CLINIC | Facility: CLINIC | Age: 88
End: 2023-10-20
Payer: MEDICARE

## 2023-10-20 VITALS
TEMPERATURE: 97.7 F | SYSTOLIC BLOOD PRESSURE: 164 MMHG | WEIGHT: 120 LBS | OXYGEN SATURATION: 97 % | BODY MASS INDEX: 22.08 KG/M2 | HEIGHT: 62 IN | DIASTOLIC BLOOD PRESSURE: 71 MMHG | HEART RATE: 82 BPM

## 2023-10-20 DIAGNOSIS — R41.89 COGNITIVE IMPAIRMENT: ICD-10-CM

## 2023-10-20 DIAGNOSIS — R79.89 HIGH SERUM THYROID STIMULATING HORMONE (TSH): ICD-10-CM

## 2023-10-20 DIAGNOSIS — I10 ESSENTIAL HYPERTENSION: ICD-10-CM

## 2023-10-20 DIAGNOSIS — Z00.00 MEDICARE ANNUAL WELLNESS VISIT, SUBSEQUENT: Primary | ICD-10-CM

## 2023-10-20 DIAGNOSIS — F41.9 ANXIETY: ICD-10-CM

## 2023-10-20 DIAGNOSIS — F13.20 BENZODIAZEPINE DEPENDENCE: ICD-10-CM

## 2023-10-20 NOTE — PROGRESS NOTES
The ABCs of the Annual Wellness Visit  Subsequent Medicare Wellness Visit    Subjective    Charo Thomas is a 89 y.o. female who presents for a Subsequent Medicare Wellness Visit.    The following portions of the patient's history were reviewed and   updated as appropriate: allergies, current medications, past family history, past medical history, past social history, past surgical history, and problem list.    Compared to one year ago, the patient feels her physical   health is the same.    Compared to one year ago, the patient feels her mental   health is the same.    Recent Hospitalizations:  This patient has had a [facility] admission record on file within the last 365 days.    Current Medical Providers:  Patient Care Team:  Gal Ortega MD as PCP - General (Family Medicine)  Lalo Edmond MD as Consulting Physician (Hematology and Oncology)  Marvin Tello MD as Referring Physician (Breast Surgery)    Outpatient Medications Prior to Visit   Medication Sig Dispense Refill    amLODIPine (NORVASC) 2.5 MG tablet Take 1 tablet by mouth once daily 90 tablet 1    ibuprofen (ADVIL,MOTRIN) 200 MG tablet Take 1 tablet by mouth. Pt is taking 3 200mg I the morning and 3 200 mg in the evening      lansoprazole (PREVACID) 30 MG capsule Take 1 capsule by mouth once daily 90 capsule 0    LORazepam (ATIVAN) 0.5 MG tablet Take 1/2 (one-half) tablet by mouth twice daily 45 tablet 0     No facility-administered medications prior to visit.       No opioid medication identified on active medication list. I have reviewed chart for other potential  high risk medication/s and harmful drug interactions in the elderly.        Aspirin is not on active medication list.  Aspirin use is not indicated based on review of current medical condition/s. Risk of harm outweighs potential benefits.  .    Patient Active Problem List   Diagnosis    Anxiety    Benzodiazepine dependence    Generalized anxiety disorder     "Gastroesophageal reflux disease    Essential hypertension    Peripheral neuropathy    Acoustic neuroma    Malignant neoplasm of overlapping sites of right breast in female, estrogen receptor positive    Vitamin B12 deficiency    Radial artery aneurysm, left    Primary osteoarthritis of left knee    Dizzy spells    Dizziness    Orthostasis    Syncope and collapse     Advance Care Planning   Advance Care Planning     Advance Directive is on file.  ACP discussion was held with the patient during this visit. Patient has an advance directive in EMR which is still valid.      Objective    Vitals:    10/20/23 1302   BP: 164/71   Pulse: 82   Temp: 97.7 °F (36.5 °C)   TempSrc: Temporal   SpO2: 97%   Weight: 54.4 kg (120 lb)   Height: 158 cm (62.21\")     Estimated body mass index is 21.8 kg/m² as calculated from the following:    Height as of this encounter: 158 cm (62.21\").    Weight as of this encounter: 54.4 kg (120 lb).    BMI is within normal parameters. No other follow-up for BMI required.      Does the patient have evidence of cognitive impairment? Yes          HEALTH RISK ASSESSMENT    Smoking Status:  Social History     Tobacco Use   Smoking Status Never   Smokeless Tobacco Never     Alcohol Consumption:  Social History     Substance and Sexual Activity   Alcohol Use No     Fall Risk Screen:    STEADI Fall Risk Assessment was completed, and patient is at LOW risk for falls.Assessment completed on:10/20/2023    Depression Screening:      10/20/2023     1:03 PM   PHQ-2/PHQ-9 Depression Screening   Little Interest or Pleasure in Doing Things 0-->not at all   Feeling Down, Depressed or Hopeless 0-->not at all   PHQ-9: Brief Depression Severity Measure Score 0       Health Habits and Functional and Cognitive Screening:      10/20/2023     1:02 PM   Functional & Cognitive Status   Do you have difficulty preparing food and eating? Yes   Do you have difficulty bathing yourself, getting dressed or grooming yourself? Yes   Do " you have difficulty using the toilet? Yes   Do you have difficulty moving around from place to place? Yes   Do you have trouble with steps or getting out of a bed or a chair? Yes   Current Diet Well Balanced Diet   Dental Exam Not up to date   Eye Exam Not up to date   Exercise (times per week) 0 times per week   Current Exercises Include No Regular Exercise   Do you need help using the phone?  No   Are you deaf or do you have serious difficulty hearing?  No   Do you need help to go to places out of walking distance? Yes   Do you need help shopping? Yes   Do you need help preparing meals?  Yes   Do you need help with housework?  Yes   Do you need help with laundry? Yes   Do you need help taking your medications? Yes   Do you need help managing money? Yes   Do you ever drive or ride in a car without wearing a seat belt? Yes   Have you felt unusual stress, anger or loneliness in the last month? No   Who do you live with? Child   If you need help, do you have trouble finding someone available to you? No   Have you been bothered in the last four weeks by sexual problems? No   Do you have difficulty concentrating, remembering or making decisions? No       Age-appropriate Screening Schedule:  Refer to the list below for future screening recommendations based on patient's age, sex and/or medical conditions. Orders for these recommended tests are listed in the plan section. The patient has been provided with a written plan.    Health Maintenance   Topic Date Due    TDAP/TD VACCINES (1 - Tdap) Never done    ZOSTER VACCINE (1 of 2) Never done    DXA SCAN  08/20/2015    MAMMOGRAM  07/20/2019    INFLUENZA VACCINE  08/01/2023    COVID-19 Vaccine (6 - 2023-24 season) 09/01/2023    ANNUAL WELLNESS VISIT  10/06/2023    LIPID PANEL  11/13/2023    Pneumococcal Vaccine 65+  Completed                  CMS Preventative Services Quick Reference  Risk Factors Identified During Encounter  Immunizations Discussed/Encouraged: Influenza and  "Prevnar 20 (Pneumococcal 20-valent conjugate)  The above risks/problems have been discussed with the patient.  Pertinent information has been shared with the patient in the After Visit Summary.  An After Visit Summary and PPPS were made available to the patient.    Follow Up:   Next Medicare Wellness visit to be scheduled in 1 year.       Additional E&M Note during same encounter follows:  Patient has multiple medical problems which are significant and separately identifiable that require additional work above and beyond the Medicare Wellness Visit.      Chief Complaint  Medicare Wellness-subsequent    Subjective        HPI  Charo Thomas is also being seen today for for follow-up of multimedical problems.  She will be moving to assisted living.    Hypertension.  Continues on amlodipine 2.5 mg a day.  Blood pressure slightly high today but previously better.  No cardiovascular symptoms.    Cognitive impairment.  Probable element of mild developing dementia.  Complicated by benzodiazepine dependence and history of anxiety.  We have attempted to wean her off the lorazepam in the past.  She was taking it twice a day.  Now taking just in the evening.    Elevated TSH earlier this year.  Needs recheck.    Breast cancer.  2016.  Followed by oncology.             Objective   Vital Signs:  /71   Pulse 82   Temp 97.7 °F (36.5 °C) (Temporal)   Ht 158 cm (62.21\")   Wt 54.4 kg (120 lb)   SpO2 97%   BMI 21.80 kg/m²     Physical Exam  Constitutional:       Appearance: Normal appearance.   HENT:      Head: Atraumatic.      Mouth/Throat:      Pharynx: Oropharynx is clear. No oropharyngeal exudate or posterior oropharyngeal erythema.   Eyes:      Conjunctiva/sclera: Conjunctivae normal.   Neck:      Comments: Thyroid examination unremarkable  Cardiovascular:      Rate and Rhythm: Normal rate and regular rhythm.      Pulses: Normal pulses.      Heart sounds: Normal heart sounds.   Pulmonary:      Effort: Pulmonary effort " is normal.      Breath sounds: Normal breath sounds.   Musculoskeletal:         General: Normal range of motion.      Cervical back: Normal range of motion and neck supple. No muscular tenderness.   Lymphadenopathy:      Cervical: No cervical adenopathy.   Skin:     General: Skin is warm and dry.      Findings: No rash.   Neurological:      Mental Status: She is alert.   Psychiatric:         Mood and Affect: Mood normal.          The following data was reviewed by: Gal Ortega MD on 10/20/2023:  Common labs          11/13/2022    07:50 11/14/2022    06:56 3/17/2023    12:06   Common Labs   Glucose 98  98  151    BUN 17  16  16    Creatinine 0.86  0.81  1.03    Sodium 139  142  143    Potassium 3.1  4.4  3.7    Chloride 102  109  104    Calcium 9.1  9.2  9.6    Albumin 3.70  3.20  4.0    Total Bilirubin 1.7  1.1  0.7    Alkaline Phosphatase 86  78  122    AST (SGOT) 20  21  17    ALT (SGPT) 9  10  10    WBC 8.79  6.67  7.17    Hemoglobin 12.7  11.7  13.1    Hematocrit 36.6  34.1  40.6    Platelets 212  197  262    Total Cholesterol 259      Triglycerides 106      HDL Cholesterol 54      LDL Cholesterol  186      Hemoglobin A1C 5.30                   Assessment and Plan   Diagnoses and all orders for this visit:    1. Medicare annual wellness visit, subsequent (Primary)    2. Essential hypertension  -     TSH Rfx On Abnormal To Free T4  -     Comprehensive Metabolic Panel    3. Benzodiazepine dependence    4. High serum thyroid stimulating hormone (TSH)  -     TSH Rfx On Abnormal To Free T4  -     Comprehensive Metabolic Panel    5. Anxiety    6. Cognitive impairment    Other orders  -     Pneumococcal Conjugate Vaccine 20-Valent All  -     Fluzone High-Dose 65+yrs      Annual Medicare wellness visit/annual physical.    Immunizations.  Influenza and Prevnar 20 today.  Recommend RSV, COVID-19 at retail pharmacy.    Cognitive impairment.  Possible early dementia.  She is moving to assisted living.    Benzodiazepine  dependence.  She was taking 1/2 tablet of lorazepam twice a day, now she taking a full tablet at nighttime.  Previous attempts to wean have been unsuccessful.    Anxiety, intermittent.  Some of that may be benzodiazepine withdrawal.    Elevated TSH in March.  Rechecking TSH today.  High threshold for treatment.    Hypertension.  Fairly well controlled.  I recommend continuing to monitor at home and/or assisted living.  Continue amlodipine as is.    Ambulatory dysfunction.  Trouble walking long distance.  High fall risk.  Would do well with intermittent use of a wheelchair if possible.    Follow-up with me otherwise as needed.  Her ongoing primary care will be through the assisted living/personal care center.         Follow Up   No follow-ups on file.  Patient was given instructions and counseling regarding her condition or for health maintenance advice. Please see specific information pulled into the AVS if appropriate.

## 2023-10-23 DIAGNOSIS — F41.9 ANXIETY: ICD-10-CM

## 2023-10-23 RX ORDER — LORAZEPAM 1 MG/1
0.5 TABLET ORAL NIGHTLY
Qty: 10 TABLET | Refills: 0 | Status: SHIPPED | OUTPATIENT
Start: 2023-10-23

## 2023-11-02 ENCOUNTER — TELEPHONE (OUTPATIENT)
Dept: FAMILY MEDICINE CLINIC | Facility: CLINIC | Age: 88
End: 2023-11-02

## 2023-11-02 ENCOUNTER — TELEPHONE (OUTPATIENT)
Dept: FAMILY MEDICINE CLINIC | Facility: CLINIC | Age: 88
End: 2023-11-02
Payer: MEDICARE

## 2023-11-02 NOTE — TELEPHONE ENCOUNTER
Bianca calling regarding order for wheelchair for patient. States the wheelchair requires specific criteria and when Dr. Ortega faxed the order form back to them, it did not include these criteria. Please call [phone]

## 2023-11-02 NOTE — TELEPHONE ENCOUNTER
Caller: Manolo ([facility])Monet    Relationship: Emergency Contact    Best call back number: [phone]     Who are you requesting to speak with (clinical staff, provider,  specific staff member): CLINICAL STAFF     What was the call regarding: WANTING TO KNOW STATUS OF WHEEL CHAIR ORDER STATES [facility] SAID THEY NEEDED UPDATED ORDER WITH DIFFERENT WORDING SO INSURANCE WILL COVER PLEASE CALL AND ADVISE

## 2023-11-03 ENCOUNTER — TELEPHONE (OUTPATIENT)
Dept: PEDIATRICS | Facility: OTHER | Age: 88
End: 2023-11-03

## 2023-11-03 NOTE — TELEPHONE ENCOUNTER
Hub staff attempted to follow warm transfer process and was unsuccessful     Caller: BRIANNA    Best call back number: [phone]     Patient is needing: [facility] STATES THEY WERE ON HOLD TO SPEAK WITH YUE AND HE CALL HAD BEEN DISCONNECTED. HUB ATTEMPTED TO WARM TRANSFER AND WAS UNSUCCESSFUL. SHE STATES THEY HAD RECEIVED A FAX REGARDING THE PATIENTS WHEELCHAIR AND HAD QUESTIONS.     PLEASE CALL AND ADVISE

## 2023-11-03 NOTE — TELEPHONE ENCOUNTER
Spoke with [facility] today and was advised that what Dr. Ortega sent over on his addended note was not enough all 5 points in the guidelines have to inserted into his note. Also has to have statement noted that since asking for lightweight w/c she does NOT have strength for standard W/C but does have for the light weight chair. They are faxing this back to the office for correction.

## 2023-11-06 NOTE — TELEPHONE ENCOUNTER
These things were not addressed at the last visit.  She is moving to a personal care facility.  They can address there.  We will not be ordering a wheelchair for her.

## 2023-11-07 NOTE — TELEPHONE ENCOUNTER
Called and thang pappas with [facility]) she is aware and will contact the pt

## 2024-08-06 ENCOUNTER — APPOINTMENT (OUTPATIENT)
Dept: CT IMAGING | Facility: HOSPITAL | Age: 89
End: 2024-08-06
Payer: MEDICARE

## 2024-08-06 ENCOUNTER — APPOINTMENT (OUTPATIENT)
Dept: GENERAL RADIOLOGY | Facility: HOSPITAL | Age: 89
End: 2024-08-06
Payer: MEDICARE

## 2024-08-06 ENCOUNTER — HOSPITAL ENCOUNTER (OUTPATIENT)
Facility: HOSPITAL | Age: 89
Discharge: NURSING, REHAB OR LONG-TERM CARE | End: 2024-08-06
Attending: EMERGENCY MEDICINE | Admitting: EMERGENCY MEDICINE
Payer: MEDICARE

## 2024-08-06 VITALS
SYSTOLIC BLOOD PRESSURE: 177 MMHG | OXYGEN SATURATION: 98 % | BODY MASS INDEX: 23.53 KG/M2 | HEIGHT: 62 IN | HEART RATE: 94 BPM | DIASTOLIC BLOOD PRESSURE: 101 MMHG | WEIGHT: 127.87 LBS | RESPIRATION RATE: 18 BRPM | TEMPERATURE: 98.1 F

## 2024-08-06 DIAGNOSIS — S09.90XA CLOSED HEAD INJURY, INITIAL ENCOUNTER: ICD-10-CM

## 2024-08-06 DIAGNOSIS — S32.9XXA CLOSED DISPLACED FRACTURE OF PELVIS, UNSPECIFIED PART OF PELVIS, INITIAL ENCOUNTER: Primary | ICD-10-CM

## 2024-08-06 PROBLEM — S72.009A HIP FRACTURE: Status: ACTIVE | Noted: 2024-08-06

## 2024-08-06 LAB
ALBUMIN SERPL-MCNC: 3.7 G/DL (ref 3.5–5.2)
ALBUMIN/GLOB SERPL: 1.4 G/DL
ALP SERPL-CCNC: 105 U/L (ref 39–117)
ALT SERPL W P-5'-P-CCNC: 14 U/L (ref 1–33)
ANION GAP SERPL CALCULATED.3IONS-SCNC: 10 MMOL/L (ref 5–15)
AST SERPL-CCNC: 18 U/L (ref 1–32)
BASOPHILS # BLD AUTO: 0.04 10*3/MM3 (ref 0–0.2)
BASOPHILS NFR BLD AUTO: 0.3 % (ref 0–1.5)
BILIRUB SERPL-MCNC: 0.7 MG/DL (ref 0–1.2)
BUN SERPL-MCNC: 19 MG/DL (ref 8–23)
BUN/CREAT SERPL: 24.1 (ref 7–25)
CALCIUM SPEC-SCNC: 9.4 MG/DL (ref 8.2–9.6)
CHLORIDE SERPL-SCNC: 106 MMOL/L (ref 98–107)
CK SERPL-CCNC: 45 U/L (ref 20–180)
CO2 SERPL-SCNC: 24 MMOL/L (ref 22–29)
CREAT SERPL-MCNC: 0.79 MG/DL (ref 0.57–1)
DEPRECATED RDW RBC AUTO: 43.7 FL (ref 37–54)
EGFRCR SERPLBLD CKD-EPI 2021: 69 ML/MIN/1.73
EOSINOPHIL # BLD AUTO: 0.06 10*3/MM3 (ref 0–0.4)
EOSINOPHIL NFR BLD AUTO: 0.4 % (ref 0.3–6.2)
ERYTHROCYTE [DISTWIDTH] IN BLOOD BY AUTOMATED COUNT: 13.1 % (ref 12.3–15.4)
GEN 5 2HR TROPONIN T REFLEX: 18 NG/L
GLOBULIN UR ELPH-MCNC: 2.7 GM/DL
GLUCOSE SERPL-MCNC: 109 MG/DL (ref 65–99)
HCT VFR BLD AUTO: 38.8 % (ref 34–46.6)
HGB BLD-MCNC: 12.7 G/DL (ref 12–15.9)
IMM GRANULOCYTES # BLD AUTO: 0.08 10*3/MM3 (ref 0–0.05)
IMM GRANULOCYTES NFR BLD AUTO: 0.5 % (ref 0–0.5)
LYMPHOCYTES # BLD AUTO: 1.01 10*3/MM3 (ref 0.7–3.1)
LYMPHOCYTES NFR BLD AUTO: 6.8 % (ref 19.6–45.3)
MCH RBC QN AUTO: 30 PG (ref 26.6–33)
MCHC RBC AUTO-ENTMCNC: 32.7 G/DL (ref 31.5–35.7)
MCV RBC AUTO: 91.7 FL (ref 79–97)
MONOCYTES # BLD AUTO: 0.61 10*3/MM3 (ref 0.1–0.9)
MONOCYTES NFR BLD AUTO: 4.1 % (ref 5–12)
NEUTROPHILS NFR BLD AUTO: 13.12 10*3/MM3 (ref 1.7–7)
NEUTROPHILS NFR BLD AUTO: 87.9 % (ref 42.7–76)
NRBC BLD AUTO-RTO: 0 /100 WBC (ref 0–0.2)
PLATELET # BLD AUTO: 263 10*3/MM3 (ref 140–450)
PMV BLD AUTO: 9.4 FL (ref 6–12)
POTASSIUM SERPL-SCNC: 3.5 MMOL/L (ref 3.5–5.2)
PROT SERPL-MCNC: 6.4 G/DL (ref 6–8.5)
QT INTERVAL: 382 MS
QTC INTERVAL: 457 MS
RBC # BLD AUTO: 4.23 10*6/MM3 (ref 3.77–5.28)
SODIUM SERPL-SCNC: 140 MMOL/L (ref 136–145)
TROPONIN T DELTA: -4 NG/L
TROPONIN T SERPL HS-MCNC: 22 NG/L
WBC NRBC COR # BLD AUTO: 14.92 10*3/MM3 (ref 3.4–10.8)

## 2024-08-06 PROCEDURE — 85025 COMPLETE CBC W/AUTO DIFF WBC: CPT | Performed by: PHYSICIAN ASSISTANT

## 2024-08-06 PROCEDURE — 72192 CT PELVIS W/O DYE: CPT

## 2024-08-06 PROCEDURE — 25010000002 MORPHINE PER 10 MG: Performed by: EMERGENCY MEDICINE

## 2024-08-06 PROCEDURE — 84484 ASSAY OF TROPONIN QUANT: CPT | Performed by: PHYSICIAN ASSISTANT

## 2024-08-06 PROCEDURE — 82550 ASSAY OF CK (CPK): CPT | Performed by: PHYSICIAN ASSISTANT

## 2024-08-06 PROCEDURE — 71045 X-RAY EXAM CHEST 1 VIEW: CPT

## 2024-08-06 PROCEDURE — 93005 ELECTROCARDIOGRAM TRACING: CPT | Performed by: PHYSICIAN ASSISTANT

## 2024-08-06 PROCEDURE — 25810000003 SODIUM CHLORIDE 0.9 % SOLUTION: Performed by: PHYSICIAN ASSISTANT

## 2024-08-06 PROCEDURE — 73560 X-RAY EXAM OF KNEE 1 OR 2: CPT

## 2024-08-06 PROCEDURE — 80053 COMPREHEN METABOLIC PANEL: CPT | Performed by: PHYSICIAN ASSISTANT

## 2024-08-06 PROCEDURE — 72125 CT NECK SPINE W/O DYE: CPT

## 2024-08-06 PROCEDURE — 96374 THER/PROPH/DIAG INJ IV PUSH: CPT

## 2024-08-06 PROCEDURE — G0378 HOSPITAL OBSERVATION PER HR: HCPCS

## 2024-08-06 PROCEDURE — 97162 PT EVAL MOD COMPLEX 30 MIN: CPT

## 2024-08-06 PROCEDURE — 90471 IMMUNIZATION ADMIN: CPT | Performed by: EMERGENCY MEDICINE

## 2024-08-06 PROCEDURE — 70450 CT HEAD/BRAIN W/O DYE: CPT

## 2024-08-06 PROCEDURE — 36415 COLL VENOUS BLD VENIPUNCTURE: CPT

## 2024-08-06 PROCEDURE — 73502 X-RAY EXAM HIP UNI 2-3 VIEWS: CPT

## 2024-08-06 PROCEDURE — 93010 ELECTROCARDIOGRAM REPORT: CPT | Performed by: INTERNAL MEDICINE

## 2024-08-06 PROCEDURE — 25010000002 ONDANSETRON PER 1 MG: Performed by: EMERGENCY MEDICINE

## 2024-08-06 PROCEDURE — 63710000001 AMLODIPINE 5 MG TABLET: Performed by: NURSE PRACTITIONER

## 2024-08-06 PROCEDURE — 99285 EMERGENCY DEPT VISIT HI MDM: CPT

## 2024-08-06 PROCEDURE — 96375 TX/PRO/DX INJ NEW DRUG ADDON: CPT

## 2024-08-06 PROCEDURE — 97110 THERAPEUTIC EXERCISES: CPT

## 2024-08-06 PROCEDURE — A9270 NON-COVERED ITEM OR SERVICE: HCPCS | Performed by: NURSE PRACTITIONER

## 2024-08-06 PROCEDURE — 25010000002 TETANUS-DIPHTH-ACELL PERTUSSIS 5-2.5-18.5 LF-MCG/0.5 SUSPENSION PREFILLED SYRINGE: Performed by: EMERGENCY MEDICINE

## 2024-08-06 PROCEDURE — 90715 TDAP VACCINE 7 YRS/> IM: CPT | Performed by: EMERGENCY MEDICINE

## 2024-08-06 RX ORDER — BUSPIRONE HYDROCHLORIDE 5 MG/1
5 TABLET ORAL 2 TIMES DAILY
COMMUNITY

## 2024-08-06 RX ORDER — AMOXICILLIN 250 MG
2 CAPSULE ORAL
COMMUNITY

## 2024-08-06 RX ORDER — HYDROCODONE BITARTRATE AND ACETAMINOPHEN 5; 325 MG/1; MG/1
1 TABLET ORAL EVERY 6 HOURS PRN
Status: DISCONTINUED | OUTPATIENT
Start: 2024-08-06 | End: 2024-08-06 | Stop reason: HOSPADM

## 2024-08-06 RX ORDER — AMLODIPINE BESYLATE 5 MG/1
2.5 TABLET ORAL DAILY
Status: DISCONTINUED | OUTPATIENT
Start: 2024-08-06 | End: 2024-08-06 | Stop reason: HOSPADM

## 2024-08-06 RX ORDER — SODIUM CHLORIDE 0.9 % (FLUSH) 0.9 %
10 SYRINGE (ML) INJECTION AS NEEDED
Status: DISCONTINUED | OUTPATIENT
Start: 2024-08-06 | End: 2024-08-06 | Stop reason: HOSPADM

## 2024-08-06 RX ORDER — BISACODYL 10 MG
10 SUPPOSITORY, RECTAL RECTAL DAILY PRN
Status: DISCONTINUED | OUTPATIENT
Start: 2024-08-06 | End: 2024-08-06 | Stop reason: HOSPADM

## 2024-08-06 RX ORDER — BISACODYL 5 MG/1
5 TABLET, DELAYED RELEASE ORAL DAILY PRN
Status: DISCONTINUED | OUTPATIENT
Start: 2024-08-06 | End: 2024-08-06 | Stop reason: HOSPADM

## 2024-08-06 RX ORDER — SODIUM CHLORIDE 9 MG/ML
40 INJECTION, SOLUTION INTRAVENOUS AS NEEDED
Status: DISCONTINUED | OUTPATIENT
Start: 2024-08-06 | End: 2024-08-06 | Stop reason: HOSPADM

## 2024-08-06 RX ORDER — POLYETHYLENE GLYCOL 3350 17 G/17G
17 POWDER, FOR SOLUTION ORAL DAILY PRN
Status: DISCONTINUED | OUTPATIENT
Start: 2024-08-06 | End: 2024-08-06 | Stop reason: HOSPADM

## 2024-08-06 RX ORDER — BUSPIRONE HYDROCHLORIDE 5 MG/1
5 TABLET ORAL 2 TIMES DAILY
Status: DISCONTINUED | OUTPATIENT
Start: 2024-08-06 | End: 2024-08-06 | Stop reason: HOSPADM

## 2024-08-06 RX ORDER — AMOXICILLIN 250 MG
2 CAPSULE ORAL 2 TIMES DAILY PRN
Status: DISCONTINUED | OUTPATIENT
Start: 2024-08-06 | End: 2024-08-06 | Stop reason: HOSPADM

## 2024-08-06 RX ORDER — HYDROCODONE BITARTRATE AND ACETAMINOPHEN 5; 325 MG/1; MG/1
1 TABLET ORAL EVERY 6 HOURS PRN
Qty: 12 TABLET | Refills: 0 | Status: SHIPPED | OUTPATIENT
Start: 2024-08-06 | End: 2024-08-09

## 2024-08-06 RX ORDER — LANOLIN ALCOHOL/MO/W.PET/CERES
1000 CREAM (GRAM) TOPICAL DAILY
COMMUNITY

## 2024-08-06 RX ORDER — ENOXAPARIN SODIUM 100 MG/ML
40 INJECTION SUBCUTANEOUS NIGHTLY
Status: DISCONTINUED | OUTPATIENT
Start: 2024-08-06 | End: 2024-08-06 | Stop reason: HOSPADM

## 2024-08-06 RX ORDER — ACETAMINOPHEN 325 MG/1
650 TABLET ORAL EVERY 6 HOURS PRN
COMMUNITY

## 2024-08-06 RX ORDER — MORPHINE SULFATE 2 MG/ML
4 INJECTION, SOLUTION INTRAMUSCULAR; INTRAVENOUS ONCE
Status: COMPLETED | OUTPATIENT
Start: 2024-08-06 | End: 2024-08-06

## 2024-08-06 RX ORDER — ONDANSETRON 2 MG/ML
4 INJECTION INTRAMUSCULAR; INTRAVENOUS ONCE
Status: COMPLETED | OUTPATIENT
Start: 2024-08-06 | End: 2024-08-06

## 2024-08-06 RX ORDER — SODIUM CHLORIDE 0.9 % (FLUSH) 0.9 %
10 SYRINGE (ML) INJECTION EVERY 12 HOURS SCHEDULED
Status: DISCONTINUED | OUTPATIENT
Start: 2024-08-06 | End: 2024-08-06 | Stop reason: HOSPADM

## 2024-08-06 RX ADMIN — Medication 10 ML: at 14:48

## 2024-08-06 RX ADMIN — TETANUS TOXOID, REDUCED DIPHTHERIA TOXOID AND ACELLULAR PERTUSSIS VACCINE, ADSORBED 0.5 ML: 5; 2.5; 8; 8; 2.5 SUSPENSION INTRAMUSCULAR at 08:55

## 2024-08-06 RX ADMIN — ONDANSETRON 4 MG: 2 INJECTION INTRAMUSCULAR; INTRAVENOUS at 08:18

## 2024-08-06 RX ADMIN — SODIUM CHLORIDE 1000 ML: 9 INJECTION, SOLUTION INTRAVENOUS at 08:17

## 2024-08-06 RX ADMIN — MORPHINE SULFATE 4 MG: 2 INJECTION, SOLUTION INTRAMUSCULAR; INTRAVENOUS at 08:19

## 2024-08-06 RX ADMIN — AMLODIPINE BESYLATE 2.5 MG: 5 TABLET ORAL at 17:52

## 2024-08-06 NOTE — ED NOTES
Pt to ER via EMS from [facility] for trip and fall around 430am. PT complains of L hip and L leg pain and has a small lac above L eyebrow.    No thinners and no LOC

## 2024-08-06 NOTE — H&P
[facility]   HISTORY AND PHYSICAL    Patient Name: Charo Thomas  : 4/3/1929  MRN: [id number]  Primary Care Physician:  Provider, No Known  Date of admission: 2024    Subjective   Subjective     Chief Complaint:   Chief Complaint   Patient presents with    Leg Pain    Fall     HPI:    Charo Thomas is a 89 y.o. female was admitted to the ED observation unit for further management after a fall with pelvic fracture.  She does not recall what happened.  Fall was unwitnessed.  Reports her pain is primarily in her left hip/buttock.  No other specific complaints at this time.  Past medical history significant for not limited to hypertension, hyperlipidemia, osteoporosis, breast cancer. No acute abnormality identified on CT imaging of head and cervical spine.  Chest x-ray showed mild pulmonary opacification within the bilateral mid to lower lungs, left greater than right, suggestive of atelectasis, pneumonia and/or edema.  Follow-up chest imaging recommended in 6 to 8 weeks to ensure resolution.  CT scan of her pelvis shows acute fractures of the left sacral kaia, left superior and inferior pubic rami, and left pubic body.  Diffuse osteopenia and severe left and moderate right hip osteoarthritis noted.  Diverticulosis without evidence of diverticulitis noted as well.  She will need to transition to the skilled nursing from assisted living at her facility. She was admitted to the ED observation unit awaiting disposition.    Review of Systems   All systems were reviewed and negative except for: those mentioned in HPI    Personal History     Past Medical History:   Diagnosis Date    Anxiety ?    Benign esophageal stricture     Breast cancer 2010    Right breast w/papillary features, primary tumor size 1.9 cm w/negative margins    Breast cancer in male, right 2013    Right breast, stage I, invasive    Diverticular disease of colon     H. pylori infection     H/O Migraines     Hip arthrosis     Hip  fracture 8/6/2024    Hyperlipidemia     Hyperplastic polyps of stomach     Hypertension     Knee swelling 1995    Osteoporosis     Psychiatric disorder     Vitamin B12 deficiency        Past Surgical History:   Procedure Laterality Date    APPENDECTOMY  1944    BREAST BIOPSY Right 2010    BREAST LUMPECTOMY      BREAST LUMPECTOMY WITH SENTINEL NODE BIOPSY Right 2013    COLONOSCOPY      Diverticular disease    DILATATION AND CURETTAGE  1957, 1971    x2       Family History: family history includes Cancer in her maternal aunt; Cancer (age of onset: 69) in her father; Cancer (age of onset: 81) in her mother; Dementia in her mother; Hypertension in her son; Other in her son. Otherwise pertinent FHx was reviewed and not pertinent to current issue.    Social History:  reports that she has never smoked. She has never used smokeless tobacco. She reports that she does not drink alcohol and does not use drugs.    Home Medications:  LORazepam, acetaminophen, amLODIPine, busPIRone, ibuprofen, lansoprazole, sennosides-docusate, and vitamin B-12    Allergies:  Allergies   Allergen Reactions    Cortisone     Diphenhydramine Hcl (Sleep)     Epinephrine     Penicillins     Sulfa Antibiotics        Objective   Objective     Vitals:   Temp:  [98.1 °F (36.7 °C)-98.3 °F (36.8 °C)] 98.1 °F (36.7 °C)  Heart Rate:  [78-94] 94  Resp:  [16-18] 18  BP: (130-177)/() 177/101  Flow (L/min):  [2] 2  Physical Exam    Constitutional: Awake, alert   Eyes: PERRLA, sclerae anicteric, no conjunctival injection   HENT: NCAT, mucous membranes moist   Neck: Supple, no thyromegaly, no lymphadenopathy, trachea midline   Respiratory: Clear to auscultation bilaterally, nonlabored respirations    Cardiovascular: RRR, no murmurs, rubs, or gallops, palpable pedal pulses bilaterally   Gastrointestinal: Positive bowel sounds, soft, nontender, nondistended   Musculoskeletal: No bilateral ankle edema, no clubbing or cyanosis to extremities   Psychiatric:  Appropriate affect, cooperative   Neurologic: Oriented x 3, strength symmetric in all extremities, Cranial Nerves grossly intact to confrontation, speech clear   Skin: Abrasion left eye    Result Review    Result Review:  I have personally reviewed the results from the time of this admission to 8/6/2024 16:52 EDT and agree with these findings:  [x]  Laboratory list / accordion  []  Microbiology  [x]  Radiology  []  EKG/Telemetry   []  Cardiology/Vascular   []  Pathology  []  Old records  []  Other:  Most notable findings include: Troponin 22, 18 with a delta of -4.  Glucose 109.  White blood cell count 14.92.  CT pelvis shows acute fractures of left sacral kaia, left superior and inferior pubic rami, left pubic body.      Assessment & Plan   Assessment / Plan     Brief Patient Summary:  Charo Thomas is a 89 y.o. female who be admitted to the ED observation unit for further management due to pelvic fracture.  Awaiting bed placement for skilled nursing facility.    Active Hospital Problems:  Active Hospital Problems    Diagnosis     **Hip fracture     Pelvic fracture      Plan:     Fall  Pelvic fracture  -Vital signs every 4 hours  -Fall precautions  -No acute abnormality identified on CT imaging of head and cervical spine.    -CT scan of her pelvis shows acute fractures of the left sacral kaia, left superior and inferior pubic rami, and left pubic body.  Diffuse osteopenia and severe left and moderate right hip osteoarthritis noted.  Diverticulosis without evidence of diverticulitis noted as well.    -Analgesics as needed    Abnormal chest x-ray  -Chest x-ray showed mild pulmonary opacification within the bilateral mid to lower lungs, left greater than right, suggestive of atelectasis, pneumonia and/or edema.  Follow-up chest imaging recommended in 6 to 8 weeks to ensure resolution.     Hypertension  -Continue amlodipine    VTE Prophylaxis:  Pharmacologic VTE prophylaxis orders are present.    CODE STATUS:        Admission Status:  I believe this patient meets observation status.    Electronically signed by PENG Diaz, 08/06/24, 4:52 PM EDT.    75 minutes has been spent by [facility] providers in the care of this patient while under observation status    I have worn appropriate PPE during this patient encounter, sanitized my hands both with entering and exiting patient's room.    This note will serve as H&P and discharge summary

## 2024-08-06 NOTE — PLAN OF CARE
Goal Outcome Evaluation:      Patient is a 89 y.o. female admitted to [facility] on 8/6/2024 for unwitnessed fall at [facility] with workup revealing L pelvic fracture. PMHx includes hyperlipidemia, anxiety, breast cancer, and hypertension. Patient is ambulatory for short distances with a RW at baseline and lives [facility]. Today, patient performed bed mobility with min-modA, required Wang for transfers, and ambulated 2' Wang with a RW- pain limiting. Patient may benefit from skilled PT services acutely to address functional deficits as well as improve level of independence prior to discharge. Anticipate need for SNF upon DC. Evaluation completed in ED in anticipation for discharge to SNF today- PT will follow if patient admitted.      Anticipated Discharge Disposition (PT): skilled nursing facility

## 2024-08-06 NOTE — ED PROVIDER NOTES
EMERGENCY DEPARTMENT MD ATTESTATION NOTE    Room Number:  128/1  PCP: Provider, No Known  Independent Historians: Patient, Family, and EMS    HPI:  A complete HPI/ROS/PMH/PSH/SH/FH are unobtainable due to:     Chronic or social conditions impacting patient care (Social Determinants of Health):       Context: Charo Thomas is a 89 y.o. female with a medical history of hyperlipidemia, anxiety, breast cancer, hypertension who presents to the ED c/o acute fall.  EMS reports patient had an unwitnessed fall this morning at an assisted living.  She complained of left hip and leg pain.  She had evidence of trauma to her head.        Review of prior external notes (non-ED) -and- Review of prior external test results outside of this encounter:  Laboratory evaluation 3/17/2023 shows normal CBC    Prescription drug monitoring program review:           PHYSICAL EXAM    I have reviewed the triage vital signs and nursing notes.    ED Triage Vitals   Temp Heart Rate Resp BP SpO2   08/06/24 0719 08/06/24 0718 08/06/24 0718 08/06/24 0718 08/06/24 0718   98.3 °F (36.8 °C) 81 17 162/72 97 %      Temp src Heart Rate Source Patient Position BP Location FiO2 (%)   -- -- -- -- --              Physical Exam  GENERAL: Awake, alert, no acute distress  SKIN: Warm, dry  HENT: Normocephalic, ecchymosis and laceration to the left eyebrow  EYES: no scleral icterus  CV: regular rhythm, regular rate  RESPIRATORY: normal effort, lungs clear  ABDOMEN: soft, nontender, nondistended  MUSCULOSKELETAL: no deformity.  Tenderness to the left hip.  NEURO: alert, moves all extremities, follows commands            MEDICATIONS GIVEN IN ER  Medications   sodium chloride 0.9 % bolus 1,000 mL (0 mL Intravenous Stopped 8/6/24 1009)   morphine injection 4 mg (4 mg Intravenous Given 8/6/24 0819)   ondansetron (ZOFRAN) injection 4 mg (4 mg Intravenous Given 8/6/24 0818)   Tetanus-Diphth-Acell Pertussis (BOOSTRIX) injection 0.5 mL (0.5 mL Intramuscular Given 8/6/24  0855)         ORDERS PLACED DURING THIS VISIT:  Orders Placed This Encounter   Procedures    Laceration Repair    CT Head Without Contrast    CT Cervical Spine Without Contrast    XR Hip With or Without Pelvis 2 - 3 View Left    XR Chest 1 View    CT Pelvis Without Contrast    XR Knee 1 or 2 View Left    Comprehensive Metabolic Panel    CBC Auto Differential    High Sensitivity Troponin T    CK    High Sensitivity Troponin T 2Hr    Weigh Patient    Saline Lock & Maintain IV Access    Inpatient Case Management  Consult    ECG 12 Lead Syncope    Telemetry Scan    Telemetry Scan    Telemetry Scan    Telemetry Scan    Telemetry Scan    Initiate ED Observation Status    Discharge patient    CBC & Differential         PROCEDURES  Procedures            PROGRESS, DATA ANALYSIS, CONSULTS, AND MEDICAL DECISION MAKING  All labs have been independently interpreted by me.  All radiology studies have been reviewed by me. All EKG's have been independently viewed and interpreted by me.  Discussion below represents my analysis of pertinent findings related to patient's condition, differential diagnosis, treatment plan and final disposition.    Differential diagnosis includes but is not limited to intracranial hemorrhage, spine fracture, syncope, hip fracture, hip dislocation.    Clinical Scores:                   ED Course as of 08/07/24 1826   Tue Aug 06, 2024   0737 Patient presents with injuries sustained in an unwitnessed fall prior to arrival.  Patient complains of left lower extremity pain, head injury.  She is amnestic to the event.  No timeframe. [EE]   0757 WBC(!): 14.92 [EE]   0757 Hemoglobin: 12.7 [EE]   0823 XR Chest 1 View  My independent interpretation of the imaging study is no pneumothorax [TR]   0825 I discussed CT imaging of the brain with Dr. Rubio.  No acute abnormalities. [EE]   0843 Creatine Kinase: 45 [EE]   0902 EKG          EKG time: 848  Rhythm/Rate: Normal sinus, rate 86  P waves and NM:  Normal P, normal CO  QRS, axis: Narrow QRS, normal axis  ST and T waves: No acute    Independently Interpreted by me  Not significantly changed compared to prior 3/17/2023   [TR]   1012 CT of the pelvis shows left-sided pelvic fracture.  Patient will not be able to ambulate.  She is in an assisted living facility.  I will discuss the patient with her CCP nurse to ensure that they have adequate care available. [EE]   1321 Unfortunately the facility will not have a bed until tomorrow.  We will admit overnight. [EE]   1323 I discussed case with Heather Pantoja NP in the observation unit.  She agrees to admit. [EE]      ED Course User Index  [EE] Nain Fonseca PA  [TR] Hoang Martínez MD       MDM: Plan to obtain laboratory evaluation.  We will perform syncope workup given that this was unwitnessed.  We will obtain imagings of the head and neck as well as left hip.      COMPLEXITY OF CARE  The patient requires admission.    Please note that portions of this document were completed with a voice recognition program.    Note Disclaimer: At [facility], we believe that sharing information builds trust and better relationships. You are receiving this note because you recently visited [facility]. It is possible you will see health information before a provider has talked with you about it. This kind of information can be easy to misunderstand. To help you fully understand what it means for your health, we urge you to discuss this note with your provider.         Hoang Martínez MD  08/06/24 1001       Hoang Martínez MD  08/06/24 1033       Hoang Martínez MD  08/06/24 1413       Hoang Martínez MD  08/07/24 1348

## 2024-08-06 NOTE — DISCHARGE PLACEMENT REQUEST
"Charo Lopes (89 y.o. Female)       Date of Birth   04/03/1929    Social Security Number       Address   [address] [place] [address]    Home Phone   [phone]    MRN   [id number]       [facility]    Marital Status                               Admission Date   8/6/24    Admission Type   Emergency    Admitting Provider       Attending Provider   Hoang Martínez MD    Department, Room/Bed   [facility] EMERGENCY DEPARTMENT, 08/08       Discharge Date       Discharge Disposition       Discharge Destination                                 Attending Provider: Hoang Martínez MD    Allergies: Cortisone, Diphenhydramine Hcl (Sleep), Epinephrine, Penicillins, Sulfa Antibiotics    Isolation: None   Infection: None   Code Status: Prior    Ht: 158 cm (62.21\")   Wt: 58 kg (127 lb 13.9 oz)    Admission Cmt: None   Principal Problem: None                  Active Insurance as of 8/6/2024       Primary Coverage       Payor Plan Insurance Group Employer/Plan Group    [facility] MEDICARE REPLACEMENT [facility] [id number]       Payor Plan Address Payor Plan Phone Number Payor Plan Fax Number Effective Dates    [address] [address]   1/1/2024 - None Entered    [facility] [id number]         Subscriber Name Subscriber Birth Date Member ID       CHARO LOPES 4/3/1929 [id number]                     Emergency Contacts        (Rel.) Home Phone Work Phone Mobile Phone    French ([employer]Monet (Daughter) [phone] -- --                "

## 2024-08-06 NOTE — THERAPY EVALUATION
Patient Name: Charo Thomas  : 4/3/1929    MRN: [id number]                              Today's Date: 2024       Admit Date: 2024    Visit Dx:     ICD-10-CM ICD-9-CM   1. Closed displaced fracture of pelvis, unspecified part of pelvis, initial encounter  S32.9XXA 808.8   2. Closed head injury, initial encounter  S09.90XA 959.01     Patient Active Problem List   Diagnosis    Anxiety    Benzodiazepine dependence    Generalized anxiety disorder    Gastroesophageal reflux disease    Essential hypertension    Peripheral neuropathy    Acoustic neuroma    Malignant neoplasm of overlapping sites of right breast in female, estrogen receptor positive    Vitamin B12 deficiency    Radial artery aneurysm, left    Primary osteoarthritis of left knee    Dizzy spells    Dizziness    Orthostasis    Syncope and collapse    Hip fracture     Past Medical History:   Diagnosis Date    Anxiety ?    Benign esophageal stricture     Breast cancer 2010    Right breast w/papillary features, primary tumor size 1.9 cm w/negative margins    Breast cancer in male, right 2013    Right breast, stage I, invasive    Diverticular disease of colon     H. pylori infection     H/O Migraines     Hip arthrosis     Hip fracture 2024    Hyperlipidemia     Hyperplastic polyps of stomach     Hypertension     Knee swelling     Osteoporosis     Psychiatric disorder     Vitamin B12 deficiency      Past Surgical History:   Procedure Laterality Date    APPENDECTOMY  89    BREAST BIOPSY Right     BREAST LUMPECTOMY      BREAST LUMPECTOMY WITH SENTINEL NODE BIOPSY Right 2013    COLONOSCOPY      Diverticular disease    DILATATION AND CURETTAGE  7, 1971    x2      General Information       Row Name 24 1330          Physical Therapy Time and Intention    Document Type evaluation  -MS     Mode of Treatment physical therapy  -MS       Row Name 24 1330          General Information    Patient Profile Reviewed yes  -MS      Prior Level of Function independent:;all household mobility  assist with ADLs, short distances with RW, also use of wc for longer distances, no other falls  -MS     Existing Precautions/Restrictions fall  -MS     Barriers to Rehab none identified  -MS       Row Name 08/06/24 1330          Living Environment    People in Home alone;other (see comments)  [facility] detention  -MS       Row Name 08/06/24 1330          Cognition    Orientation Status (Cognition) oriented x 3  -MS       Row Name 08/06/24 1330          Safety Issues, Functional Mobility    Safety Issues Affecting Function (Mobility) insight into deficits/self-awareness;sequencing abilities;judgment  -MS     Impairments Affecting Function (Mobility) strength;endurance/activity tolerance;balance;range of motion (ROM);pain  -MS     Comment, Safety Issues/Impairments (Mobility) Gait belt and non skid socks donned.  -MS               User Key  (r) = Recorded By, (t) = Taken By, (c) = Cosigned By      Initials Name Provider Type    MS Alba Man PT Physical Therapist                   Mobility       Row Name 08/06/24 1331          Bed Mobility    Bed Mobility supine-sit;sit-supine  -MS     Supine-Sit [place] (Bed Mobility) minimum assist (75% patient effort);verbal cues;nonverbal cues (demo/gesture)  -MS     Sit-Supine [place] (Bed Mobility) moderate assist (50% patient effort);verbal cues;nonverbal cues (demo/gesture)  -MS       Row Name 08/06/24 1331          Transfers    Comment, (Transfers) Sequencing and hand placement cues.  -MS       Row Name 08/06/24 1331          Sit-Stand Transfer    Sit-Stand [place] (Transfers) minimum assist (75% patient effort);verbal cues;nonverbal cues (demo/gesture)  -MS     Assistive Device (Sit-Stand Transfers) walker, front-wheeled  -MS       Row Name 08/06/24 1331          Gait/Stairs (Locomotion)    [place] Level (Gait) minimum assist (75% patient effort);verbal cues;nonverbal cues (demo/gesture)   -MS     Assistive Device (Gait) walker, front-wheeled  -MS     Patient was able to Ambulate yes  -MS     Distance in Feet (Gait) 2  -MS     Comment, (Gait/Stairs) Small steps towards HOB- pain limiting.  -MS       Row Name 08/06/24 1331          Mobility    Extremity Weight-bearing Status left lower extremity  -MS     Left Lower Extremity (Weight-bearing Status) weight-bearing as tolerated (WBAT)  -MS               User Key  (r) = Recorded By, (t) = Taken By, (c) = Cosigned By      Initials Name Provider Type    Alba Mcfarland, PT Physical Therapist                   Obj/Interventions       Row Name 08/06/24 1332          Range of Motion Comprehensive    Comment, General Range of Motion L LE limited 2/2 pain  -MS       Row Name 08/06/24 1332          Strength Comprehensive (MMT)    Comment, General Manual Muscle Testing (MMT) Assessment L LE limited 2/2 pain, B LEs grossly at least 3+/5  -MS       Row Name 08/06/24 1332          Balance    Balance Assessment sitting static balance;standing static balance  -MS     Static Sitting Balance standby assist  -MS     Position, Sitting Balance sitting edge of bed  -MS     Static Standing Balance contact guard  -MS     Position/Device Used, Standing Balance supported;walker, front-wheeled  -MS       Row Name 08/06/24 1332          Sensory Assessment (Somatosensory)    Sensory Assessment (Somatosensory) sensation intact  -MS               User Key  (r) = Recorded By, (t) = Taken By, (c) = Cosigned By      Initials Name Provider Type    Alba Mcfarland, PT Physical Therapist                   Goals/Plan       Row Name 08/06/24 1333          Bed Mobility Goal 1 (PT)    Activity/Assistive Device (Bed Mobility Goal 1, PT) bed mobility activities, all  -MS     [place] Level/Cues Needed (Bed Mobility Goal 1, PT) supervision required  -MS     Time Frame (Bed Mobility Goal 1, PT) 1 week  -MS       Row Name 08/06/24 1333          Transfer Goal 1 (PT)     Activity/Assistive Device (Transfer Goal 1, PT) transfers, all;walker, rolling  -MS     [place] Level/Cues Needed (Transfer Goal 1, PT) supervision required  -MS     Time Frame (Transfer Goal 1, PT) 1 week  -MS       Row Name 08/06/24 1333          Gait Training Goal 1 (PT)    Activity/Assistive Device (Gait Training Goal 1, PT) gait (walking locomotion);walker, rolling  -MS     [place] Level (Gait Training Goal 1, PT) supervision required  -MS     Distance (Gait Training Goal 1, PT) 50  -MS     Time Frame (Gait Training Goal 1, PT) 1 week  -MS       Row Name 08/06/24 1333          Therapy Assessment/Plan (PT)    Planned Therapy Interventions (PT) balance training;bed mobility training;gait training;home exercise program;postural re-education;patient/family education;ROM (range of motion);stair training;strengthening;transfer training  -MS               User Key  (r) = Recorded By, (t) = Taken By, (c) = Cosigned By      Initials Name Provider Type    MS Alba Man, PT Physical Therapist                   Clinical Impression       Row Name 08/06/24 1332          Pain    Pretreatment Pain Rating 5/10  -MS     Posttreatment Pain Rating 5/10  -MS     Pain Location - Side/Orientation Left  -MS     Pain Location lower  -MS     Pain Location - extremity  -MS     Pain Intervention(s) Repositioned;Ambulation/increased activity;Rest  -MS       Row Name 08/06/24 1332          Therapy Assessment/Plan (PT)    Rehab Potential (PT) good, to achieve stated therapy goals  -MS     Criteria for Skilled Interventions Met (PT) yes;meets criteria  -MS     Therapy Frequency (PT) 6 times/wk  -MS       Row Name 08/06/24 1332          Vital Signs    O2 Delivery Pre Treatment supplemental O2  -MS     O2 Delivery Intra Treatment supplemental O2  -MS     O2 Delivery Post Treatment supplemental O2  -MS       Row Name 08/06/24 1332          Positioning and Restraints    Pre-Treatment Position in bed  -MS     Post Treatment  Position bed  -MS     In Bed fowlers;call light within reach;encouraged to call for assist;with family/caregiver  -MS               User Key  (r) = Recorded By, (t) = Taken By, (c) = Cosigned By      Initials Name Provider Type    Alba Mcfarland, PT Physical Therapist                   Outcome Measures       Row Name 08/06/24 1333          How much help from another person do you currently need...    Turning from your back to your side while in flat bed without using bedrails? 2  -MS     Moving from lying on back to sitting on the side of a flat bed without bedrails? 2  -MS     Moving to and from a bed to a chair (including a wheelchair)? 3  -MS     Standing up from a chair using your arms (e.g., wheelchair, bedside chair)? 3  -MS     Climbing 3-5 steps with a railing? 1  -MS     To walk in hospital room? 2  -MS     AM-PAC 6 Clicks Score (PT) 13  -MS     Highest Level of Mobility Goal 4 --> Transfer to chair/commode  -MS       Row Name 08/06/24 1333          Functional Assessment    Outcome Measure Options AM-PAC 6 Clicks Basic Mobility (PT)  -MS               User Key  (r) = Recorded By, (t) = Taken By, (c) = Cosigned By      Initials Name Provider Type    Alba Mcfarland, PT Physical Therapist                                 Physical Therapy Education       Title: PT OT SLP Therapies (In Progress)       Topic: Physical Therapy (Done)       Point: Mobility training (Done)       Learning Progress Summary             Patient Acceptance, E,TB, VU by MS at 8/6/2024 1334                         Point: Home exercise program (Done)       Learning Progress Summary             Patient Acceptance, E,TB, VU by MS at 8/6/2024 1334                         Point: Body mechanics (Done)       Learning Progress Summary             Patient Acceptance, E,TB, VU by MS at 8/6/2024 1334                         Point: Precautions (Done)       Learning Progress Summary             Patient Acceptance, E,TB, VU by MS at  8/6/2024 1334                                         User Key       Initials Effective Dates Name Provider Type Discipline    MS 06/16/21 -  Alba Man PT Physical Therapist PT                  PT Recommendation and Plan  Planned Therapy Interventions (PT): balance training, bed mobility training, gait training, home exercise program, postural re-education, patient/family education, ROM (range of motion), stair training, strengthening, transfer training        Time Calculation:         PT Charges       Row Name 08/06/24 1330             Time Calculation    Start Time 1314  -MS      Stop Time 1328  -MS      Time Calculation (min) 14 min  -MS      PT Received On 08/06/24  -MS      PT - Next Appointment 08/07/24  -MS      PT Goal Re-Cert Due Date 08/13/24  -MS                User Key  (r) = Recorded By, (t) = Taken By, (c) = Cosigned By      Initials Name Provider Type    MS Man, Alba AYALA, PT Physical Therapist                  Therapy Charges for Today       Code Description Service Date Service Provider Modifiers Qty    27505070560 HC PT EVAL MOD COMPLEXITY 3 8/6/2024 Alba Man, PT GP 1    45343202491 HC PT THER PROC EA 15 MIN 8/6/2024 Alba Man, PT GP 1            PT G-Codes  Outcome Measure Options: AM-PAC 6 Clicks Basic Mobility (PT)  AM-PAC 6 Clicks Score (PT): 13  PT Discharge Summary  Anticipated Discharge Disposition (PT): skilled nursing facility    Alba Man PT  8/6/2024

## 2024-08-06 NOTE — ED PROVIDER NOTES
EMERGENCY DEPARTMENT ENCOUNTER    Room Number:  128/1  Date of encounter:  8/6/2024  PCP: Provider, No Known  Historian: Patient, daughter  Chronic or social conditions impacting care (social determinants of health): DNR from nursing home    HPI:  Chief Complaint: Fall  A complete HPI/ROS/PMH/PSH/SH/FH are unobtainable due to: Dementia    Context: Charo Thomas is a 89 y.o. female with a history of anxiety, hypertension, who presents to the ED c/o acute injuries sustained in a fall last night.  This was an unwitnessed fall.  Nursing home staff found her on the ground at 4:30 AM this morning.  The patient self does not recall the fall.  Patient has a laceration to the left eyebrow, as well as pain in the left hip.  She denies any chest pain or shortness of breath.  No blood thinners.    Review of prior external notes (non-ED):   I reviewed primary care office visit from 10/20/2023.  Patient followed for history of hypertension, hypothyroidism.    Review of prior external test results outside of this encounter:  I reviewed a CMP from 3/17/2023.  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 pylori infection     H/O Migraines     Hip arthrosis     Hip fracture 8/6/2024    Hyperlipidemia     Hyperplastic polyps of stomach     Hypertension     Knee swelling 1995    Osteoporosis     Psychiatric disorder          PAST SURGICAL HISTORY  Past Surgical History:   Procedure Laterality Date    APPENDECTOMY  1944    BREAST BIOPSY Right 2010    BREAST LUMPECTOMY      BREAST LUMPECTOMY WITH SENTINEL NODE BIOPSY Right 2013    COLONOSCOPY      Diverticular disease    DILATATION AND CURETTAGE  1957, 1971    x2         FAMILY HISTORY  Family History   Problem Relation Age of Onset    Dementia Mother     Cancer Mother 81        Breast removed    Cancer Father 69        Throat; smoker    Cancer Maternal Aunt         2 aunts at age 49 and 70; deaths unrelated to cancer    Other Son         Brain tumor    Hypertension Son          SOCIAL HISTORY  Social History     Socioeconomic History    Marital status:      Spouse name: Marvin    Number of children: 1    Years of education: High School   Tobacco Use    Smoking status: Never    Smokeless tobacco: Never   Vaping Use    Vaping status: Never Used   Substance and Sexual Activity    Alcohol use: No    Drug use: No    Sexual activity: Never         ALLERGIES  Cortisone, Diphenhydramine hcl (sleep), Epinephrine, Penicillins, and Sulfa antibiotics        REVIEW OF SYSTEMS  Unobtainable secondary to dementia      PHYSICAL EXAM    I have reviewed the triage vital signs and nursing notes.    ED Triage Vitals   Temp Heart Rate Resp BP SpO2   08/06/24 0719 08/06/24 0718 08/06/24 0718 08/06/24 0718 08/06/24 0718   98.3 °F (36.8 °C) 81 17 162/72 97 %      Temp src Heart Rate Source Patient Position BP Location FiO2 (%)   -- -- -- -- --              Physical Exam  GENERAL: Alert, oriented to self, not distressed  HENT: 2 small lacerations to the left eyebrow area.  No dental or nasal injury.   No cervical tenderness or vertebral step-off.  EYES: no scleral icterus, EOMI  CV: regular rhythm, regular rate, no murmur  RESPIRATORY: normal effort, CTA  ABDOMEN: soft, nontender  MUSCULOSKELETAL: Decreased range of motion of the left hip secondary to pain.  Left lower extremity is shortened.  Neurovascularly intact distally.  NEURO: alert, moves all extremities, follows commands  SKIN: warm, dry        LAB RESULTS  Recent Results (from the past 24 hour(s))   Comprehensive Metabolic Panel    Collection Time: 08/06/24  7:37 AM    Specimen: Blood   Result Value Ref Range    Glucose 109 (H) 65 - 99 mg/dL    BUN 19 8 - 23 mg/dL    Creatinine 0.79 0.57 - 1.00 mg/dL    Sodium 140 136 - 145 mmol/L    Potassium 3.5 3.5 - 5.2 mmol/L    Chloride 106 98 - 107 mmol/L    CO2 24.0 22.0 - 29.0 mmol/L    Calcium 9.4 8.2 - 9.6 mg/dL    Total Protein 6.4 6.0 - 8.5 g/dL    Albumin 3.7 3.5 - 5.2 g/dL    ALT (SGPT) 14 1 - 33 U/L    AST (SGOT) 18 1 - 32 U/L    Alkaline Phosphatase 105 39 - 117 U/L    Total Bilirubin 0.7 0.0 - 1.2 mg/dL    Globulin 2.7 gm/dL    A/G Ratio 1.4 g/dL    BUN/Creatinine Ratio 24.1 7.0 - 25.0    Anion Gap 10.0 5.0 - 15.0 mmol/L    eGFR 69.0 >60.0 mL/min/1.73   CBC Auto Differential    Collection Time: 08/06/24  7:37 AM    Specimen: Blood   Result Value Ref Range    WBC 14.92 (H) 3.40 - 10.80 10*3/mm3    RBC 4.23 3.77 - 5.28 10*6/mm3    Hemoglobin 12.7 12.0 - 15.9 g/dL    Hematocrit 38.8 34.0 - 46.6 %    MCV 91.7 79.0 - 97.0 fL    MCH 30.0 26.6 - 33.0 pg    MCHC 32.7 31.5 - 35.7 g/dL    RDW 13.1 12.3 - 15.4 %    RDW-SD 43.7 37.0 - 54.0 fl    MPV 9.4 6.0 - 12.0 fL    Platelets 263 140 - 450 10*3/mm3    Neutrophil % 87.9 (H) 42.7 - 76.0 %    Lymphocyte % 6.8 (L) 19.6 - 45.3 %    Monocyte % 4.1 (L) 5.0 - 12.0 %    Eosinophil % 0.4 0.3 - 6.2 %    Basophil % 0.3 0.0 - 1.5 %    Immature Grans % 0.5 0.0 - 0.5 %    Neutrophils, Absolute 13.12 (H) 1.70 - 7.00 10*3/mm3    Lymphocytes, Absolute 1.01 0.70 - 3.10 10*3/mm3     Monocytes, Absolute 0.61 0.10 - 0.90 10*3/mm3    Eosinophils, Absolute 0.06 0.00 - 0.40 10*3/mm3    Basophils, Absolute 0.04 0.00 - 0.20 10*3/mm3    Immature Grans, Absolute 0.08 (H) 0.00 - 0.05 10*3/mm3    nRBC 0.0 0.0 - 0.2 /100 WBC   High Sensitivity Troponin T    Collection Time: 08/06/24  7:37 AM    Specimen: Blood   Result Value Ref Range    HS Troponin T 22 (H) <14 ng/L   CK    Collection Time: 08/06/24  7:37 AM    Specimen: Blood   Result Value Ref Range    Creatine Kinase 45 20 - 180 U/L   ECG 12 Lead Syncope    Collection Time: 08/06/24  8:48 AM   Result Value Ref Range    QT Interval 382 ms    QTC Interval 457 ms   High Sensitivity Troponin T 2Hr    Collection Time: 08/06/24  9:35 AM    Specimen: Blood   Result Value Ref Range    HS Troponin T 18 (H) <14 ng/L    Troponin T Delta -4 (L) >=-4 - <+4 ng/L       Ordered the above labs and independently reviewed the results.        RADIOLOGY  CT Pelvis Without Contrast    Result Date: 8/6/2024  CT PELVIS WITHOUT CONTRAST  HISTORY: Left hip pain. Fall. Evaluate for fracture.  TECHNIQUE: Pelvic CT includes axial imaging through the pelvis without contrast and data reconstructed in coronal and sagittal planes. Radiation does reduction techniques were utilized, including automated exposure control and exposure modulation based on body size.  COMPARISON: None.  FINDINGS: There is an acute fracture of the left sacral ala associated with cortical buckling anteriorly. There are also acute fractures of the left superior and inferior pubic rami and left pubic body with mild displacement. There is diffuse osteopenia.  Advanced osteoarthritis is present in left hip with superior lateral migration of the femoral head and joint space loss and subchondral cyst formation as well as marginal spur formation. There is moderate right hip osteoarthritis with medial migration of the right femoral head. Mild generalized urinary bladder distention is present. There is  atherosclerotic disease. Sigmoid diverticulosis without evidence for diverticulitis.      1. Acute fractures of the left sacral ala, left superior and inferior pubic rami, and left pubic body. 2. Diffuse osteopenia. 3. Severe left and moderate right hip osteoarthritis. 4. Sigmoid diverticulosis without evidence of diverticulitis. Atherosclerotic disease.  This report was finalized on 8/6/2024 12:33 PM by Dr. Zane Varner M.D on Workstation: [id number]      XR Knee 1 or 2 View Left    Result Date: 8/6/2024  Left knee radiograph  HISTORY: Fall, pain  TECHNIQUE: AP and lateral radiographs of the left knee  COMPARISON: None      FINDINGS AND IMPRESSION: Small to moderate joint effusion is present.  Prominent osseous density which appears to be at least partially contiguous with the posterior inferior aspect of patella without a definitive area of cortical offset seen. While findings may represent a prominent osteophyte, fracture in this area cannot be excluded and correlation with patient history and point tenderness in this area is recommended follow-up CT if clinically indicated.    This report was finalized on 8/6/2024 9:14 AM by Dr. Chris Oshea M.D on Workstation: [id number]      CT Head Without Contrast, CT Cervical Spine Without Contrast    Result Date: 8/6/2024  EMERGENCY CT SCAN OF THE HEAD AND CERVICAL SPINE WITHOUT CONTRAST ON 08/06/2024  CLINICAL HISTORY: This is a 89-year-old female patient who fell and had trauma to forehead and has headache and neck pain.  HEAD CT TECHNIQUE: Spiral CT images were obtained from the base of the skull to the vertex without intravenous contrast. The images were reformatted and are submitted in 3 mm thick axial, sagittal and coronal CT sections with brain algorithm and 2 mm thick axial CT sections with high-resolution bone algorithm.  This is correlated to a prior head CT from [facility] on 03/17/2023 and an MRI of the brain on 11/13/2022.  FINDINGS:  There patchy nodular areas of low-density in the periventricular and subcortical white matter of the cerebral hemispheres consistent with mild-to-moderate small vessel disease. The remainder of the brain parenchyma is normal in attenuation. The ventricles are normal in size. I see no focal mass effect. There is no midline shift. No extra-axial fluid collections are identified. There is no evidence of acute intracranial hemorrhage. 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 There is diffuse cerebral atrophy. There is a scalp hematoma over the inferolateral left frontal bone extending int the left lateral periorbital scalp from today's head trauma. No underlying acute skull fracture is identified. The calvarium and skull base are normal in appearance. The paranasal sinuses, mastoid air cells and middle ear cavities are clear.      1. No acute intracranial abnormality is identified with no significant change when compared to prior MRI of the brain on 11/13/2022.  2. There is mild-to-moderate small vessel disease in the cerebral white matter and there is diffuse cerebral atrophy and there is chronic prominence of the subdural spaces overlying the anterior and lateral frontal lobes that may be due to frontal lobe volume loss or atrophy versus chronic subdural hygromas unchanged since 11/13/2022.  3. There is a scalp hematoma extending over the anterior inferolateral left frontal bone into lateral left periorbital fat from today's head trauma. The remainder of the head CT is normal with no acute skull fracture or intracranial hemorrhage identified.  CERVICAL SPINE CT TECHNIQUE: Spiral CT images were obtained from the skull base down to the T2 thoracic level. The images were reformatted and submitted in 2 mm thick axial and sagittal CT sections with soft tissue algorithm and 1 mm thick axial,  sagittal and coronal CT sections with high-resolution bone algorithm.  This is correlated to prior CT angiograms of the head and neck on 11/12/2022 and 08/22/2022. There are no prior dedicated cervical spine imaging studies for comparison.  FINDINGS: There are mild arthritic changes at the atlantooccipital articulation.  At C1-2, there are arthritic changes at the atlantodental interval with marginal spurring off the anterior ring of C1 and the odontoid; otherwise, the C1-2 level is normal in appearance.  At C2-3, there is moderate right facet overgrowth. The posterior disc margin and left facets and uncovertebral joints are normal and there is no canal or foraminal narrowing.  At C3-4, there is mild-to-moderate bilateral facet overgrowth, a mild posterior disc bulge. There is mild canal narrowing. There is some left uncovertebral joint hypertrophy and there is mild-to-moderate left and no right foraminal narrowing.  At C4-5, there is mild disc space narrowing and mild degenerative endplate changes and a mild diffuse posterior disc osteophyte complex and mild canal narrowing. There is mild facet overgrowth and uncovertebral joint hypertrophy and there is mild bilateral bony foraminal narrowing.  At C5-6, there is mild disc space narrowing and degenerative endplate changes and mild posterior spurring and mild narrowing of the central to left side of the canal. There is mild facet overgrowth and some uncovertebral joint hypertrophy left greater than right and there is mild-to-moderate left and no right foraminal narrowing.  At C6-7, there is a tiny posterior central disc osteophyte complex, no canal narrowing. The facets and uncovertebral joints are normal and there is no foraminal narrowing.  At C7-T1, there is mild bilateral facet overgrowth and a 1 mm anterolisthesis of C7 on T1. There is no canal or foraminal narrowing.  No acute fracture is seen in the cervical spine.  IMPRESSION:  1. No acute fracture is seen in  the cervical spine. There is cervical spondylosis as described above.  Radiation dose reduction techniques were utilized, including automated exposure control and exposure modulation based on body size.   This report was finalized on 8/6/2024 8:54 AM by Dr. Sami Rubio M.D on Workstation: [id number]      XR Hip With or Without Pelvis 2 - 3 View Left, XR Chest 1 View    Result Date: 8/6/2024  Chest and pelvis and left hip radiograph  HISTORY: Fall, pain  TECHNIQUE: Portable AP chest and AP pelvis and AP and lateral radiographs of the left hip  COMPARISON: Chest radiograph 3/17/2023      FINDINGS AND IMPRESSION:  Chest: No pneumothorax is seen. Cardiac silhouette is within normal limits for size. There is mild pulmonary pacification within the bilateral mid to lower lungs, left greater than right, suggestive atelectasis, pneumonia and/or edema in the appropriate clinical context and correlation with patient history is recommended follow-up chest CT if clinically indicated. Given the asymmetry, at least continued attention on follow-up chest radiograph in 6 to 8 weeks recommended to ensure resolution exclude the possibility of malignancy.  Pelvis and left hip: There is significant generalized bone demineralization limiting evaluation. There is asymmetry of the left obturator ring highly concerning for fracture. Additionally, there are significant advanced degenerative changes within the left hip with superior lateral migration and deformation of the left femoral head and extensive surrounding hypertrophic degenerative changes. Unfortunately, in this setting underlying left hip fracture cannot be excluded. Further evaluation with CT of the pelvis is recommended to further characterize these findings.  This report was finalized on 8/6/2024 8:39 AM by Dr. Chris Ohsea M.D on Workstation: [id number]       I ordered the above noted radiological studies. Reviewed by me and discussed with radiologist.  See dictation  for official radiology interpretation.      MEDICATIONS GIVEN IN ER    Medications   sodium chloride 0.9 % flush 10 mL (has no administration in time range)   sodium chloride 0.9 % flush 10 mL (10 mL Intravenous Given 8/6/24 1448)   sodium chloride 0.9 % flush 10 mL (has no administration in time range)   sodium chloride 0.9 % infusion 40 mL (has no administration in time range)   sennosides-docusate (PERICOLACE) 8.6-50 MG per tablet 2 tablet (has no administration in time range)     And   polyethylene glycol (MIRALAX) packet 17 g (has no administration in time range)     And   bisacodyl (DULCOLAX) EC tablet 5 mg (has no administration in time range)     And   bisacodyl (DULCOLAX) suppository 10 mg (has no administration in time range)   HYDROcodone-acetaminophen (NORCO) 5-325 MG per tablet 1 tablet (has no administration in time range)   Enoxaparin Sodium (LOVENOX) syringe 40 mg (has no administration in time range)   sodium chloride 0.9 % bolus 1,000 mL (0 mL Intravenous Stopped 8/6/24 1009)   morphine injection 4 mg (4 mg Intravenous Given 8/6/24 0819)   ondansetron (ZOFRAN) injection 4 mg (4 mg Intravenous Given 8/6/24 0818)   Tetanus-Diphth-Acell Pertussis (BOOSTRIX) injection 0.5 mL (0.5 mL Intramuscular Given 8/6/24 0855)     Laceration Repair    Date/Time: 8/6/2024 4:00 PM    Performed by: Nain Fonseca PA  Authorized by: Hoang Martínez MD    Consent:     Consent obtained:  Verbal    Consent given by:  Patient  Universal protocol:     Patient identity confirmed:  Verbally with patient  Anesthesia:     Anesthesia method:  None  Laceration details:     Location:  Face    Face location:  L eyebrow    Length (cm):  2.4  Pre-procedure details:     Preparation:  Patient was prepped and draped in usual sterile fashion  Exploration:     Wound extent: no foreign bodies/material noted, no nerve damage noted and no underlying fracture noted    Treatment:     Area cleansed with:  Chlorhexidine    Amount of cleaning:   Standard    Irrigation solution:  Sterile saline  Skin repair:     Repair method:  Tissue adhesive  Approximation:     Approximation:  Close  Repair type:     Repair type:  Simple  Post-procedure details:     Dressing:  Open (no dressing)    Procedure completion:  Tolerated well, no immediate complications        ADDITIONAL ORDERS CONSIDERED BUT NOT ORDERED:  None    PROGRESS, DATA ANALYSIS, CONSULTS, AND MEDICAL DECISION MAKING    All labs have been independently interpreted by myself.  All radiology studies have been independently interpreted by myself and discussed with radiologist dictating the report.   EKG's independently interpreted by myself.  Discussion below represents my analysis of pertinent findings related to patient's condition, differential diagnosis, treatment plan and final disposition.    I have discussed case with Dr. Martínez, emergency room physician.  He has performed his own bedside examination and agrees with treatment plan.    ED Course as of 08/06/24 1601   Tue Aug 06, 2024   0737 Patient presents with injuries sustained in an unwitnessed fall prior to arrival.  Patient complains of left lower extremity pain, head injury.  She is amnestic to the event.  No timeframe. [EE]   0757 WBC(!): 14.92 [EE]   0757 Hemoglobin: 12.7 [EE]   0823 XR Chest 1 View  My independent interpretation of the imaging study is no pneumothorax [TR]   0825 I discussed CT imaging of the brain with Dr. Rubio.  No acute abnormalities. [EE]   0843 Creatine Kinase: 45 [EE]   0902 EKG          EKG time: 848  Rhythm/Rate: Normal sinus, rate 86  P waves and AR: Normal P, normal AR  QRS, axis: Narrow QRS, normal axis  ST and T waves: No acute    Independently Interpreted by me  Not significantly changed compared to prior 3/17/2023   [TR]   1012 CT of the pelvis shows left-sided pelvic fracture.  Patient will not be able to ambulate.  She is in an assisted living facility.  I will discuss the patient with her CCP nurse to  ensure that they have adequate care available. [EE]   1321 Unfortunately the facility will not have a bed until tomorrow.  We will admit overnight. [EE]   1323 I discussed case with Heather Pantoja NP in the observation unit.  She agrees to admit. [EE]      ED Course User Index  [EE] Nain Fonseca PA  [TR] Hoang Martínez MD       AS OF 16:01 EDT VITALS:    BP - (!) 177/101  HR - 94  TEMP - 98.1 °F (36.7 °C) (Oral)  O2 SATS - 98%        DIAGNOSIS  Final diagnoses:   Closed displaced fracture of pelvis, unspecified part of pelvis, initial encounter   Closed head injury, initial encounter         DISPOSITION  Admitted      Dictated utilizing Dragon dictation     Nain Fonseca PA  08/06/24 1602

## 2024-08-06 NOTE — CASE MANAGEMENT/SOCIAL WORK
Post-Acute Authorization Submission      Post Acute Pre-Cert Documentation  Request Submitted by Facility - Type:: Hospital  Post-Acute Authorization Type Submitted:: SNF  Date Post Acute Pre-Cert Inititated per Facility: 08/06/24  [place] [facility] [place] Requested: 08/07/24  All Clinicals Submitted?: Yes  Had Accepting Facility at Time of Submission: Yes  Response Communicated to:: , Accepting Facility Liaison  Authorization Number:: [id number]              SAVANA Redman

## 2024-08-06 NOTE — PROGRESS NOTES
"[facility] Clinical Pharmacy Services: Enoxaparin Consult    Charo Thomas has a pharmacy consult to dose prophylactic enoxaparin per Heather Pantoja APRN request.     Indication: VTE Prophylaxis  Home Anticoagulation: N/A     Relevant clinical data and objective history reviewed:  89 y.o. female 158 cm (62.21\") 58 kg (127 lb 13.9 oz)   Body mass index is 23.23 kg/m².   Results from last 7 days   Lab Units 08/06/24  0737   PLATELETS 10*3/mm3 263     Estimated Creatinine Clearance: 39 mL/min (by C-G formula based on SCr of 0.79 mg/dL).    Assessment/Plan    Will start patient on lovenox 40mg subcutaneous every 24 hours, adjusted for renal function. Consult order will be discontinued but pharmacy will continue to follow.     Isatu Farfan, Pharmacy Intern    "

## 2024-08-06 NOTE — CASE MANAGEMENT/SOCIAL WORK
Discharge Planning Assessment  [facility]     Patient Name: Charo Thomas  MRN: [id number]  Today's Date: 8/6/2024    Admit Date: 8/6/2024        Discharge Needs Assessment    No documentation.                  Discharge Plan       Row Name 08/06/24 1406       Plan    Plan Comments Spoke w/Ce [place] [place] who advised will have a bed for patient tomorrow pending precert; PT has evaluated and referral sent for precert per protocol                  Continued Care and Services - Admitted Since 8/6/2024       Destination       Service Provider Request Status Selected Services Address Phone Fax Patient Preferred    [facility] Pending - Request Sent N/A [address] [facility] [id number] [phone] --                     Demographic Summary    No documentation.                  Functional Status    No documentation.                  Psychosocial    No documentation.                  Abuse/Neglect    No documentation.                  Legal    No documentation.                  Substance Abuse    No documentation.                  Patient Forms    No documentation.                     Opal Vivar RN

## 2024-08-06 NOTE — CASE MANAGEMENT/SOCIAL WORK
Post-Acute Authorization Submission      Post Acute Pre-Cert Documentation  Request Submitted by Facility - Type:: Hospital  Post-Acute Authorization Type Submitted:: SNF  Date Post Acute Pre-Cert Inititated per Facility: 08/06/24  Date Post Acute Pre-Cert Completed: 08/06/24  Accepting Facility: [facility] Discharge Date Requested: 08/07/24  All Clinicals Submitted?: Yes  Had Accepting Facility at Time of Submission: Yes  Response Received from Insurance?: Approval  Response Communicated to:: , Accepting Facility Liaison, Accepting Facility Auth Department  Authorization Number:: [id number]  Post Acute Pre-Cert Initiated Comment: VALID TO ADMIT UPTO 8/10/2024              Frankie Clark, PCT

## 2024-08-06 NOTE — ED NOTES
Nursing report ED to floor  Charo Thomas  89 y.o.  female    HPI :  HPI (Adult)  Stated Reason for Visit: fall, small lac, and L leg pain    Chief Complaint  Chief Complaint   Patient presents with    Leg Pain    Fall       Admitting doctor:   Kashif Chavez II, MD    Admitting diagnosis:   The primary encounter diagnosis was Closed displaced fracture of pelvis, unspecified part of pelvis, initial encounter. A diagnosis of Closed head injury, initial encounter was also pertinent to this visit.    Code status:   Current Code Status       Date Active Code Status Order ID Comments User Context       Prior            Allergies:   Cortisone, Diphenhydramine hcl (sleep), Epinephrine, Penicillins, and Sulfa antibiotics    Isolation:   No active isolations    Intake and Output    Intake/Output Summary (Last 24 hours) at 8/6/2024 1338  Last data filed at 8/6/2024 1009  Gross per 24 hour   Intake 1000 ml   Output --   Net 1000 ml       Weight:       08/06/24  0821   Weight: 58 kg (127 lb 13.9 oz)       Most recent vitals:   Vitals:    08/06/24 1131 08/06/24 1201 08/06/24 1301 08/06/24 1331   BP: 130/76 164/82 160/78 167/85   Pulse: 85 82 84 93   Resp:  16     Temp:       SpO2: 97% 97% 96% 97%   Weight:       Height:           Active LDAs/IV Access:   Lines, Drains & Airways       Active LDAs       Name Placement date Placement time Site Days    Peripheral IV 08/06/24 0719 Left Antecubital 08/06/24  0719  Antecubital  less than 1                    Labs (abnormal labs have a star):   Labs Reviewed   COMPREHENSIVE METABOLIC PANEL - Abnormal; Notable for the following components:       Result Value    Glucose 109 (*)     All other components within normal limits    Narrative:     GFR Normal >60  Chronic Kidney Disease <60  Kidney Failure <15    The GFR formula is only valid for adults with stable renal function between ages 18 and 70.   CBC WITH AUTO DIFFERENTIAL - Abnormal; Notable for the following components:    WBC  14.92 (*)     Neutrophil % 87.9 (*)     Lymphocyte % 6.8 (*)     Monocyte % 4.1 (*)     Neutrophils, Absolute 13.12 (*)     Immature Grans, Absolute 0.08 (*)     All other components within normal limits   TROPONIN - Abnormal; Notable for the following components:    HS Troponin T 22 (*)     All other components within normal limits    Narrative:     High Sensitive Troponin T Reference Range:  <14.0 ng/L- Negative Female for AMI  <22.0 ng/L- Negative Male for AMI  >=14 - Abnormal Female indicating possible myocardial injury.  >=22 - Abnormal Male indicating possible myocardial injury.   Clinicians would have to utilize clinical acumen, EKG, Troponin, and serial changes to determine if it is an Acute Myocardial Infarction or myocardial injury due to an underlying chronic condition.        HIGH SENSITIVITIY TROPONIN T 2HR - Abnormal; Notable for the following components:    HS Troponin T 18 (*)     Troponin T Delta -4 (*)     All other components within normal limits    Narrative:     High Sensitive Troponin T Reference Range:  <14.0 ng/L- Negative Female for AMI  <22.0 ng/L- Negative Male for AMI  >=14 - Abnormal Female indicating possible myocardial injury.  >=22 - Abnormal Male indicating possible myocardial injury.   Clinicians would have to utilize clinical acumen, EKG, Troponin, and serial changes to determine if it is an Acute Myocardial Infarction or myocardial injury due to an underlying chronic condition.        CK - Normal   CBC AND DIFFERENTIAL    Narrative:     The following orders were created for panel order CBC & Differential.  Procedure                               Abnormality         Status                     ---------                               -----------         ------                     CBC Auto Differential[[id number]]        Abnormal            Final result                 Please view results for these tests on the individual orders.       EKG:   ECG 12 Lead Syncope   Final Result   HEART  RATE=86  bpm   RR Tllwvjsu=456  ms   IL Uwlmqrem=392  ms   P Horizontal Axis=-34  deg   P Front Axis=4  deg   QRSD Interval=93  ms   QT Jkxgbnsw=382  ms   XChJ=088  ms   QRS Axis=-14  deg   T Wave Axis=62  deg   - NORMAL ECG -   Sinus rhythm   No change from previous tracing   Electronically Signed By: Dm Padilla ([facility]) 2024-08-06 13:15:16   Date and Time of Study:2024-08-06 08:48:42      Telemetry Scan   Final Result      Telemetry Scan   Final Result      Telemetry Scan   Final Result      Telemetry Scan   Final Result      Telemetry Scan   Final Result          Meds given in ED:   Medications   sodium chloride 0.9 % flush 10 mL (has no administration in time range)   sodium chloride 0.9 % flush 10 mL (has no administration in time range)   sodium chloride 0.9 % flush 10 mL (has no administration in time range)   sodium chloride 0.9 % infusion 40 mL (has no administration in time range)   sennosides-docusate (PERICOLACE) 8.6-50 MG per tablet 2 tablet (has no administration in time range)     And   polyethylene glycol (MIRALAX) packet 17 g (has no administration in time range)     And   bisacodyl (DULCOLAX) EC tablet 5 mg (has no administration in time range)     And   bisacodyl (DULCOLAX) suppository 10 mg (has no administration in time range)   Pharmacy to Dose enoxaparin (LOVENOX) (has no administration in time range)   HYDROcodone-acetaminophen (NORCO) 5-325 MG per tablet 1 tablet (has no administration in time range)   sodium chloride 0.9 % bolus 1,000 mL (0 mL Intravenous Stopped 8/6/24 1009)   morphine injection 4 mg (4 mg Intravenous Given 8/6/24 0819)   ondansetron (ZOFRAN) injection 4 mg (4 mg Intravenous Given 8/6/24 0818)   Tetanus-Diphth-Acell Pertussis (BOOSTRIX) injection 0.5 mL (0.5 mL Intramuscular Given 8/6/24 0855)       Imaging results:  CT Pelvis Without Contrast    Result Date: 8/6/2024  1. Acute fractures of the left sacral ala, left superior and inferior pubic rami, and left pubic body. 2.  Diffuse osteopenia. 3. Severe left and moderate right hip osteoarthritis. 4. Sigmoid diverticulosis without evidence of diverticulitis. Atherosclerotic disease.  This report was finalized on 8/6/2024 12:33 PM by Dr. Zane Varner M.D on Workstation: [id number]      XR Knee 1 or 2 View Left    Result Date: 8/6/2024  FINDINGS AND IMPRESSION: Small to moderate joint effusion is present.  Prominent osseous density which appears to be at least partially contiguous with the posterior inferior aspect of patella without a definitive area of cortical offset seen. While findings may represent a prominent osteophyte, fracture in this area cannot be excluded and correlation with patient history and point tenderness in this area is recommended follow-up CT if clinically indicated.    This report was finalized on 8/6/2024 9:14 AM by Dr. Chris Oshea M.D on Workstation: [id number]      CT Head Without Contrast    Result Date: 8/6/2024  1. No acute intracranial abnormality is identified with no significant change when compared to prior MRI of the brain on 11/13/2022.  2. There is mild-to-moderate small vessel disease in the cerebral white matter and there is diffuse cerebral atrophy and there is chronic prominence of the subdural spaces overlying the anterior and lateral frontal lobes that may be due to frontal lobe volume loss or atrophy versus chronic subdural hygromas unchanged since 11/13/2022.  3. There is a scalp hematoma extending over the anterior inferolateral left frontal bone into lateral left periorbital fat from today's head trauma. The remainder of the head CT is normal with no acute skull fracture or intracranial hemorrhage identified.  CERVICAL SPINE CT TECHNIQUE: Spiral CT images were obtained from the skull base down to the T2 thoracic level. The images were reformatted and submitted in 2 mm thick axial and sagittal CT sections with soft tissue algorithm and 1 mm thick axial, sagittal and coronal CT  sections with high-resolution bone algorithm.  This is correlated to prior CT angiograms of the head and neck on 11/12/2022 and 08/22/2022. There are no prior dedicated cervical spine imaging studies for comparison.  FINDINGS: There are mild arthritic changes at the atlantooccipital articulation.  At C1-2, there are arthritic changes at the atlantodental interval with marginal spurring off the anterior ring of C1 and the odontoid; otherwise, the C1-2 level is normal in appearance.  At C2-3, there is moderate right facet overgrowth. The posterior disc margin and left facets and uncovertebral joints are normal and there is no canal or foraminal narrowing.  At C3-4, there is mild-to-moderate bilateral facet overgrowth, a mild posterior disc bulge. There is mild canal narrowing. There is some left uncovertebral joint hypertrophy and there is mild-to-moderate left and no right foraminal narrowing.  At C4-5, there is mild disc space narrowing and mild degenerative endplate changes and a mild diffuse posterior disc osteophyte complex and mild canal narrowing. There is mild facet overgrowth and uncovertebral joint hypertrophy and there is mild bilateral bony foraminal narrowing.  At C5-6, there is mild disc space narrowing and degenerative endplate changes and mild posterior spurring and mild narrowing of the central to left side of the canal. There is mild facet overgrowth and some uncovertebral joint hypertrophy left greater than right and there is mild-to-moderate left and no right foraminal narrowing.  At C6-7, there is a tiny posterior central disc osteophyte complex, no canal narrowing. The facets and uncovertebral joints are normal and there is no foraminal narrowing.  At C7-T1, there is mild bilateral facet overgrowth and a 1 mm anterolisthesis of C7 on T1. There is no canal or foraminal narrowing.  No acute fracture is seen in the cervical spine.  IMPRESSION:  1. No acute fracture is seen in the cervical spine.  There is cervical spondylosis as described above.  Radiation dose reduction techniques were utilized, including automated exposure control and exposure modulation based on body size.   This report was finalized on 8/6/2024 8:54 AM by Dr. Sami Rubio M.D on Workstation: [id number]      CT Cervical Spine Without Contrast    Result Date: 8/6/2024  1. No acute intracranial abnormality is identified with no significant change when compared to prior MRI of the brain on 11/13/2022.  2. There is mild-to-moderate small vessel disease in the cerebral white matter and there is diffuse cerebral atrophy and there is chronic prominence of the subdural spaces overlying the anterior and lateral frontal lobes that may be due to frontal lobe volume loss or atrophy versus chronic subdural hygromas unchanged since 11/13/2022.  3. There is a scalp hematoma extending over the anterior inferolateral left frontal bone into lateral left periorbital fat from today's head trauma. The remainder of the head CT is normal with no acute skull fracture or intracranial hemorrhage identified.  CERVICAL SPINE CT TECHNIQUE: Spiral CT images were obtained from the skull base down to the T2 thoracic level. The images were reformatted and submitted in 2 mm thick axial and sagittal CT sections with soft tissue algorithm and 1 mm thick axial, sagittal and coronal CT sections with high-resolution bone algorithm.  This is correlated to prior CT angiograms of the head and neck on 11/12/2022 and 08/22/2022. There are no prior dedicated cervical spine imaging studies for comparison.  FINDINGS: There are mild arthritic changes at the atlantooccipital articulation.  At C1-2, there are arthritic changes at the atlantodental interval with marginal spurring off the anterior ring of C1 and the odontoid; otherwise, the C1-2 level is normal in appearance.  At C2-3, there is moderate right facet overgrowth. The posterior disc margin and left facets and uncovertebral  joints are normal and there is no canal or foraminal narrowing.  At C3-4, there is mild-to-moderate bilateral facet overgrowth, a mild posterior disc bulge. There is mild canal narrowing. There is some left uncovertebral joint hypertrophy and there is mild-to-moderate left and no right foraminal narrowing.  At C4-5, there is mild disc space narrowing and mild degenerative endplate changes and a mild diffuse posterior disc osteophyte complex and mild canal narrowing. There is mild facet overgrowth and uncovertebral joint hypertrophy and there is mild bilateral bony foraminal narrowing.  At C5-6, there is mild disc space narrowing and degenerative endplate changes and mild posterior spurring and mild narrowing of the central to left side of the canal. There is mild facet overgrowth and some uncovertebral joint hypertrophy left greater than right and there is mild-to-moderate left and no right foraminal narrowing.  At C6-7, there is a tiny posterior central disc osteophyte complex, no canal narrowing. The facets and uncovertebral joints are normal and there is no foraminal narrowing.  At C7-T1, there is mild bilateral facet overgrowth and a 1 mm anterolisthesis of C7 on T1. There is no canal or foraminal narrowing.  No acute fracture is seen in the cervical spine.  IMPRESSION:  1. No acute fracture is seen in the cervical spine. There is cervical spondylosis as described above.  Radiation dose reduction techniques were utilized, including automated exposure control and exposure modulation based on body size.   This report was finalized on 8/6/2024 8:54 AM by Dr. Sami Rubio M.D on Workstation: [id number]      XR Hip With or Without Pelvis 2 - 3 View Left    Result Date: 8/6/2024  FINDINGS AND IMPRESSION:  Chest: No pneumothorax is seen. Cardiac silhouette is within normal limits for size. There is mild pulmonary pacification within the bilateral mid to lower lungs, left greater than right, suggestive atelectasis,  pneumonia and/or edema in the appropriate clinical context and correlation with patient history is recommended follow-up chest CT if clinically indicated. Given the asymmetry, at least continued attention on follow-up chest radiograph in 6 to 8 weeks recommended to ensure resolution exclude the possibility of malignancy.  Pelvis and left hip: There is significant generalized bone demineralization limiting evaluation. There is asymmetry of the left obturator ring highly concerning for fracture. Additionally, there are significant advanced degenerative changes within the left hip with superior lateral migration and deformation of the left femoral head and extensive surrounding hypertrophic degenerative changes. Unfortunately, in this setting underlying left hip fracture cannot be excluded. Further evaluation with CT of the pelvis is recommended to further characterize these findings.  This report was finalized on 8/6/2024 8:39 AM by Dr. Chris Oshea M.D on Workstation: [employer] Chest 1 View    Result Date: 8/6/2024  FINDINGS AND IMPRESSION:  Chest: No pneumothorax is seen. Cardiac silhouette is within normal limits for size. There is mild pulmonary pacification within the bilateral mid to lower lungs, left greater than right, suggestive atelectasis, pneumonia and/or edema in the appropriate clinical context and correlation with patient history is recommended follow-up chest CT if clinically indicated. Given the asymmetry, at least continued attention on follow-up chest radiograph in 6 to 8 weeks recommended to ensure resolution exclude the possibility of malignancy.  Pelvis and left hip: There is significant generalized bone demineralization limiting evaluation. There is asymmetry of the left obturator ring highly concerning for fracture. Additionally, there are significant advanced degenerative changes within the left hip with superior lateral migration and deformation of the left femoral head and  "extensive surrounding hypertrophic degenerative changes. Unfortunately, in this setting underlying left hip fracture cannot be excluded. Further evaluation with CT of the pelvis is recommended to further characterize these findings.  This report was finalized on 8/6/2024 8:39 AM by Dr. Chris Oshea M.D on Workstation: [id number]       Ambulatory status:   -        Social issues:   Social History     Socioeconomic History    Marital status:      Spouse name: Marvin    Number of children: 1    Years of education: High School   Tobacco Use    Smoking status: Never    Smokeless tobacco: Never   Vaping Use    Vaping status: Never Used   Substance and Sexual Activity    Alcohol use: No    Drug use: No    Sexual activity: Never       Peripheral Neurovascular  Peripheral Neurovascular (Adult)  Peripheral Neurovascular WDL: WDL    Neuro Cognitive  Neuro Cognitive (Adult)  Cognitive/Neuro/Behavioral WDL: WDL    Learning  Learning Assessment (Adult)  Learning Readiness and Ability: no barriers identified    Respiratory  Respiratory WDL  Respiratory WDL: WDL    Abdominal Pain       Pain Assessments  Pain (Adult)  (0-10) Pain Rating: Rest: 8  Response to Pain Interventions: other (see comments) (interventions \"somewhat\" effective per pt; asked pt if she would like myself to ask the provider for medication and pt refused at this time; I instructed pt to use the call light if she changed her mind and that I would be back to check on her.)    NIH Stroke Scale       Thalia Roman RN  08/06/24 13:38 EDT    "

## 2024-08-06 NOTE — PLAN OF CARE
Goal Outcome Evaluation:   Pt admitted overnight pending placement into rehab. Pt was admitted for a pelvic fracture. Daughter at bedside. Purewick placed due to immobility. 2 L 02 applied while sleeping. PT and ortho consulted.

## 2024-08-06 NOTE — CASE MANAGEMENT/SOCIAL WORK
Discharge Planning Assessment  [facility]     Patient Name: Charo Thomas  MRN: [id number]  Today's Date: 8/6/2024    Admit Date: 8/6/2024        Discharge Needs Assessment    No documentation.                  Discharge Plan       Row Name 08/06/24 1649       Plan    Plan Comments Call placed to [facility] to arrange transport as patient has bed ready and approval for tonight per Ce (liaison) w/[facility]- spoke jackson/Deyvi who gave ETA of 1900- Updated obs provider and primary RN- call placed to daughter to update      Row Name 08/06/24 1609       Plan    Plan Comments Received approval from [facility] for bed at [facility]- spoke w/Ce who advised facility has bed available today- sending note to provider for anticipated d/c prior to arranging stretcher transport. Updated daughter      Row Name 08/06/24 1406       Plan    Plan Comments Spoke Andrew w/[facility] who advised will have a bed for patient tomorrow pending precert; PT has evaluated and referral sent for precert per protocol                  Continued Care and Services - Admitted Since 8/6/2024       Destination       Service Provider Request Status Selected Services Address Phone Fax Patient Preferred    [facility] Accepted N/A [id number] [place] [address] [phone] --                     Demographic Summary    No documentation.                  Functional Status    No documentation.                  Psychosocial    No documentation.                  Abuse/Neglect    No documentation.                  Legal    No documentation.                  Substance Abuse    No documentation.                  Patient Forms    No documentation.                     Opal Vivar, RN

## 2024-08-06 NOTE — CASE MANAGEMENT/SOCIAL WORK
Discharge Planning Assessment  [facility]     Patient Name: Charo Thomas  MRN: [id number]  Today's Date: 8/6/2024    Admit Date: 8/6/2024        Discharge Needs Assessment    No documentation.                  Discharge Plan       Row Name 08/06/24 1609       Plan    Plan Comments Received approval from [facility] for bed at [facility]- spoke w/Ce who advised facility has bed available today- sending note to provider for anticipated d/c prior to arranging stretcher transport. Updated daughter      Row Name 08/06/24 1405       Plan    Plan Comments Spoke w/Ce w/[facility] who advised will have a bed for patient tomorrow pending precert; PT has evaluated and referral sent for precert per protocol                  Continued Care and Services - Admitted Since 8/6/2024       Destination       Service Provider Request Status Selected Services Address Phone Fax Patient Preferred    [facility] Accepted N/A [id number] [facility] [address] [phone] --                     Demographic Summary    No documentation.                  Functional Status    No documentation.                  Psychosocial    No documentation.                  Abuse/Neglect    No documentation.                  Legal    No documentation.                  Substance Abuse    No documentation.                  Patient Forms    No documentation.                     Opal Vivar RN

## 2024-08-06 NOTE — PLAN OF CARE
Goal Outcome Evaluation:      Pt awaiting EMS transport back to [facility]

## 2024-08-06 NOTE — CASE MANAGEMENT/SOCIAL WORK
Discharge Planning Assessment  [facility]     Patient Name: Charo Thomas  MRN: [id number]  Today's Date: 8/6/2024    Admit Date: 8/6/2024        Discharge Needs Assessment    No documentation.                  Discharge Plan       Row Name 08/06/24 1021       Plan    Plan Comments Entered room, introduced self and explained role to patient and daughter Monet at bedside per provider request; Received permission to speak in front of daughter who is HCS: Verified patient is a resident at [facility] assisted living; pt is retired, receives assistance w/all ADL's, ambulates w/assist of walker and w/c- Pt is seen by facility physician (daughter unsure of name) and RX filled by facility pharmacy - Pt fell at facility during the night- has sustained a pelvic fracture and daughter is requesting patient transfer to [facility] side at [facility]- Discussing further w/ER provider- referral sent and call placed to liaison w/[facility]                  Continued Care and Services - Admitted Since 8/6/2024       Destination       Service Provider Request Status Selected Services Address Phone Fax Patient Preferred    [facility] Pending - Request Sent N/A [id number] [address] [phone] --                     Demographic Summary    No documentation.                  Functional Status    No documentation.                  Psychosocial    No documentation.                  Abuse/Neglect    No documentation.                  Legal    No documentation.                  Substance Abuse    No documentation.                  Patient Forms    No documentation.                     Opal Vivar RN